# Patient Record
Sex: MALE | Race: BLACK OR AFRICAN AMERICAN | NOT HISPANIC OR LATINO | Employment: PART TIME | URBAN - METROPOLITAN AREA
[De-identification: names, ages, dates, MRNs, and addresses within clinical notes are randomized per-mention and may not be internally consistent; named-entity substitution may affect disease eponyms.]

---

## 2017-09-15 DIAGNOSIS — I10 BENIGN ESSENTIAL HTN: ICD-10-CM

## 2017-09-15 RX ORDER — LOSARTAN POTASSIUM 100 MG/1
TABLET ORAL
Qty: 90 TABLET | Refills: 2 | OUTPATIENT
Start: 2017-09-15

## 2017-09-15 RX ORDER — AMLODIPINE BESYLATE 10 MG/1
TABLET ORAL
Qty: 90 TABLET | Refills: 2 | OUTPATIENT
Start: 2017-09-15

## 2017-09-26 DIAGNOSIS — I10 BENIGN ESSENTIAL HTN: ICD-10-CM

## 2017-09-26 RX ORDER — AMLODIPINE BESYLATE 10 MG/1
TABLET ORAL
Qty: 90 TABLET | Refills: 0 | Status: SHIPPED | OUTPATIENT
Start: 2017-09-26 | End: 2018-01-11 | Stop reason: SDUPTHER

## 2017-09-26 RX ORDER — LOSARTAN POTASSIUM 100 MG/1
TABLET ORAL
Qty: 90 TABLET | Refills: 0 | Status: SHIPPED | OUTPATIENT
Start: 2017-09-26 | End: 2018-01-11 | Stop reason: SDUPTHER

## 2017-12-24 DIAGNOSIS — I10 BENIGN ESSENTIAL HTN: ICD-10-CM

## 2017-12-26 RX ORDER — AMLODIPINE BESYLATE 10 MG/1
TABLET ORAL
Qty: 90 TABLET | Refills: 0 | OUTPATIENT
Start: 2017-12-26

## 2017-12-26 RX ORDER — LOSARTAN POTASSIUM 100 MG/1
TABLET ORAL
Qty: 90 TABLET | Refills: 0 | OUTPATIENT
Start: 2017-12-26

## 2017-12-30 DIAGNOSIS — I10 BENIGN ESSENTIAL HTN: ICD-10-CM

## 2018-01-01 ENCOUNTER — TELEPHONE (OUTPATIENT)
Dept: INTERNAL MEDICINE | Facility: CLINIC | Age: 68
End: 2018-01-01

## 2018-01-01 ENCOUNTER — APPOINTMENT (OUTPATIENT)
Dept: GENERAL RADIOLOGY | Facility: HOSPITAL | Age: 68
End: 2018-01-01
Attending: THORACIC SURGERY (CARDIOTHORACIC VASCULAR SURGERY)

## 2018-01-01 ENCOUNTER — READMISSION MANAGEMENT (OUTPATIENT)
Dept: CALL CENTER | Facility: HOSPITAL | Age: 68
End: 2018-01-01

## 2018-01-01 ENCOUNTER — APPOINTMENT (OUTPATIENT)
Dept: GENERAL RADIOLOGY | Facility: HOSPITAL | Age: 68
End: 2018-01-01

## 2018-01-01 ENCOUNTER — TRANSCRIBE ORDERS (OUTPATIENT)
Dept: CARDIOLOGY | Facility: CLINIC | Age: 68
End: 2018-01-01

## 2018-01-01 ENCOUNTER — APPOINTMENT (OUTPATIENT)
Dept: CARDIOLOGY | Facility: HOSPITAL | Age: 68
End: 2018-01-01

## 2018-01-01 ENCOUNTER — TREATMENT (OUTPATIENT)
Dept: PHYSICAL THERAPY | Facility: CLINIC | Age: 68
End: 2018-01-01

## 2018-01-01 ENCOUNTER — ANESTHESIA (OUTPATIENT)
Dept: PERIOP | Facility: HOSPITAL | Age: 68
End: 2018-01-01

## 2018-01-01 ENCOUNTER — HOSPITAL ENCOUNTER (INPATIENT)
Facility: HOSPITAL | Age: 68
LOS: 9 days | Discharge: HOME HEALTH | End: 2018-12-23
Attending: INTERNAL MEDICINE | Admitting: THORACIC SURGERY (CARDIOTHORACIC VASCULAR SURGERY)

## 2018-01-01 ENCOUNTER — APPOINTMENT (OUTPATIENT)
Dept: RESPIRATORY THERAPY | Facility: HOSPITAL | Age: 68
End: 2018-01-01
Attending: INTERNAL MEDICINE

## 2018-01-01 ENCOUNTER — HOSPITAL ENCOUNTER (OUTPATIENT)
Dept: GENERAL RADIOLOGY | Facility: HOSPITAL | Age: 68
Discharge: HOME | End: 2018-10-25
Admitting: FAMILY MEDICINE

## 2018-01-01 ENCOUNTER — HOSPITAL ENCOUNTER (OUTPATIENT)
Dept: CARDIOLOGY | Facility: HOSPITAL | Age: 68
Discharge: HOME | End: 2018-12-07
Attending: INTERNAL MEDICINE | Admitting: INTERNAL MEDICINE

## 2018-01-01 ENCOUNTER — ANCILLARY PROCEDURE (OUTPATIENT)
Dept: PERIOP | Facility: HOSPITAL | Age: 68
End: 2018-01-01
Attending: ANESTHESIOLOGY

## 2018-01-01 ENCOUNTER — OFFICE VISIT (OUTPATIENT)
Dept: INTERNAL MEDICINE | Facility: CLINIC | Age: 68
End: 2018-01-01

## 2018-01-01 ENCOUNTER — OFFICE VISIT (OUTPATIENT)
Dept: CARDIOLOGY | Facility: CLINIC | Age: 68
End: 2018-01-01

## 2018-01-01 ENCOUNTER — LAB (OUTPATIENT)
Dept: LAB | Facility: HOSPITAL | Age: 68
End: 2018-01-01

## 2018-01-01 ENCOUNTER — ANESTHESIA EVENT (OUTPATIENT)
Dept: PERIOP | Facility: HOSPITAL | Age: 68
End: 2018-01-01

## 2018-01-01 VITALS — WEIGHT: 215 LBS | BODY MASS INDEX: 30.78 KG/M2 | HEIGHT: 70 IN

## 2018-01-01 VITALS
WEIGHT: 218.25 LBS | OXYGEN SATURATION: 99 % | HEART RATE: 89 BPM | SYSTOLIC BLOOD PRESSURE: 95 MMHG | BODY MASS INDEX: 31.25 KG/M2 | HEIGHT: 70 IN | DIASTOLIC BLOOD PRESSURE: 57 MMHG | TEMPERATURE: 99.1 F | RESPIRATION RATE: 18 BRPM

## 2018-01-01 VITALS
SYSTOLIC BLOOD PRESSURE: 128 MMHG | WEIGHT: 217 LBS | HEIGHT: 70 IN | BODY MASS INDEX: 31.07 KG/M2 | DIASTOLIC BLOOD PRESSURE: 80 MMHG | HEART RATE: 92 BPM

## 2018-01-01 VITALS
BODY MASS INDEX: 31.84 KG/M2 | SYSTOLIC BLOOD PRESSURE: 156 MMHG | OXYGEN SATURATION: 93 % | DIASTOLIC BLOOD PRESSURE: 88 MMHG | HEIGHT: 70 IN | HEART RATE: 100 BPM | WEIGHT: 222.4 LBS | TEMPERATURE: 98.3 F

## 2018-01-01 VITALS
DIASTOLIC BLOOD PRESSURE: 80 MMHG | BODY MASS INDEX: 31.22 KG/M2 | SYSTOLIC BLOOD PRESSURE: 138 MMHG | HEIGHT: 70 IN | OXYGEN SATURATION: 98 % | TEMPERATURE: 98.5 F | WEIGHT: 218.1 LBS | HEART RATE: 93 BPM

## 2018-01-01 DIAGNOSIS — I10 BENIGN ESSENTIAL HTN: Primary | ICD-10-CM

## 2018-01-01 DIAGNOSIS — R94.39 ABNORMAL STRESS TEST: ICD-10-CM

## 2018-01-01 DIAGNOSIS — R26.2 DIFFICULTY WALKING: ICD-10-CM

## 2018-01-01 DIAGNOSIS — N13.8 BENIGN PROSTATIC HYPERPLASIA WITH URINARY OBSTRUCTION: ICD-10-CM

## 2018-01-01 DIAGNOSIS — I20.8 STABLE ANGINA (HCC): ICD-10-CM

## 2018-01-01 DIAGNOSIS — Z72.0 TOBACCO ABUSE: ICD-10-CM

## 2018-01-01 DIAGNOSIS — I45.10 BUNDLE BRANCH BLOCK, RIGHT: ICD-10-CM

## 2018-01-01 DIAGNOSIS — R94.31 ABNORMAL EKG: ICD-10-CM

## 2018-01-01 DIAGNOSIS — M25.551 PAIN OF RIGHT HIP JOINT: Primary | ICD-10-CM

## 2018-01-01 DIAGNOSIS — G89.29 CHRONIC PAIN OF RIGHT LOWER EXTREMITY: ICD-10-CM

## 2018-01-01 DIAGNOSIS — Z01.810 PREPROCEDURAL CARDIOVASCULAR EXAMINATION: ICD-10-CM

## 2018-01-01 DIAGNOSIS — M79.604 CHRONIC PAIN OF RIGHT LOWER EXTREMITY: ICD-10-CM

## 2018-01-01 DIAGNOSIS — E78.00 ELEVATED CHOLESTEROL: ICD-10-CM

## 2018-01-01 DIAGNOSIS — R09.02 HYPOXEMIA: ICD-10-CM

## 2018-01-01 DIAGNOSIS — N40.1 BENIGN PROSTATIC HYPERPLASIA WITH URINARY OBSTRUCTION: ICD-10-CM

## 2018-01-01 DIAGNOSIS — E78.00 ELEVATED CHOLESTEROL: Primary | ICD-10-CM

## 2018-01-01 DIAGNOSIS — I20.8 STABLE ANGINA (HCC): Primary | ICD-10-CM

## 2018-01-01 DIAGNOSIS — R29.898 TIGHT UNBALANCED MUSCLES: Primary | ICD-10-CM

## 2018-01-01 DIAGNOSIS — R06.02 SHORTNESS OF BREATH: Primary | ICD-10-CM

## 2018-01-01 DIAGNOSIS — J43.1 PANLOBULAR EMPHYSEMA (HCC): ICD-10-CM

## 2018-01-01 DIAGNOSIS — Z13.6 SCREENING FOR CARDIOVASCULAR CONDITION: Primary | ICD-10-CM

## 2018-01-01 DIAGNOSIS — E55.9 AVITAMINOSIS D: ICD-10-CM

## 2018-01-01 DIAGNOSIS — I25.119 CORONARY ARTERY DISEASE INVOLVING NATIVE CORONARY ARTERY OF NATIVE HEART WITH ANGINA PECTORIS (HCC): Primary | ICD-10-CM

## 2018-01-01 DIAGNOSIS — R06.02 SHORTNESS OF BREATH: ICD-10-CM

## 2018-01-01 DIAGNOSIS — Z12.5 ENCOUNTER FOR SCREENING FOR MALIGNANT NEOPLASM OF PROSTATE: ICD-10-CM

## 2018-01-01 DIAGNOSIS — Z13.6 SCREENING FOR CARDIOVASCULAR CONDITION: ICD-10-CM

## 2018-01-01 DIAGNOSIS — Z23 NEED FOR INFLUENZA VACCINATION: ICD-10-CM

## 2018-01-01 DIAGNOSIS — I42.0 DILATED CARDIOMYOPATHY (HCC): ICD-10-CM

## 2018-01-01 DIAGNOSIS — G47.33 OSA (OBSTRUCTIVE SLEEP APNEA): ICD-10-CM

## 2018-01-01 DIAGNOSIS — M25.551 PAIN OF RIGHT HIP JOINT: ICD-10-CM

## 2018-01-01 DIAGNOSIS — Z95.1 S/P CABG X 7: ICD-10-CM

## 2018-01-01 LAB
ABO + RH BLD: NORMAL
ABO GROUP BLD: NORMAL
ACT BLD: 131 SECONDS (ref 82–152)
ACT BLD: 136 SECONDS (ref 82–152)
ACT BLD: 389 SECONDS (ref 82–152)
ACT BLD: 439 SECONDS (ref 82–152)
ACT BLD: 439 SECONDS (ref 82–152)
ACT BLD: 604 SECONDS (ref 82–152)
ALBUMIN SERPL-MCNC: 3.6 G/DL (ref 3.5–5.2)
ALBUMIN SERPL-MCNC: 4.1 G/DL (ref 3.5–5.2)
ALBUMIN SERPL-MCNC: 4.3 G/DL (ref 3.5–5.2)
ALBUMIN SERPL-MCNC: 4.6 G/DL (ref 3.5–5.2)
ALBUMIN SERPL-MCNC: 4.8 G/DL (ref 3.5–5.2)
ALBUMIN/GLOB SERPL: 1.3 G/DL
ALP SERPL-CCNC: 93 U/L (ref 39–117)
ALT SERPL-CCNC: 29 U/L (ref 1–41)
ALT SERPL-CCNC: 30 U/L (ref 1–41)
ANION GAP SERPL CALCULATED.3IONS-SCNC: 10 MMOL/L
ANION GAP SERPL CALCULATED.3IONS-SCNC: 10.3 MMOL/L
ANION GAP SERPL CALCULATED.3IONS-SCNC: 11 MMOL/L
ANION GAP SERPL CALCULATED.3IONS-SCNC: 11.3 MMOL/L
ANION GAP SERPL CALCULATED.3IONS-SCNC: 11.8 MMOL/L
ANION GAP SERPL CALCULATED.3IONS-SCNC: 11.9 MMOL/L
ANION GAP SERPL CALCULATED.3IONS-SCNC: 13.1 MMOL/L
ANION GAP SERPL CALCULATED.3IONS-SCNC: 13.5 MMOL/L
ANION GAP SERPL CALCULATED.3IONS-SCNC: 14.7 MMOL/L
ANION GAP SERPL CALCULATED.3IONS-SCNC: 5.1 MMOL/L
ANION GAP SERPL CALCULATED.3IONS-SCNC: 7.2 MMOL/L
ANION GAP SERPL CALCULATED.3IONS-SCNC: 8.1 MMOL/L
APTT PPP: 32.3 SECONDS (ref 22.7–35.4)
ARTERIAL PATENCY WRIST A: ABNORMAL
ARTERIAL PATENCY WRIST A: POSITIVE
AST SERPL-CCNC: 22 U/L (ref 1–40)
AST SERPL-CCNC: 25 U/L (ref 1–40)
ATMOSPHERIC PRESS: 754.9 MMHG
ATMOSPHERIC PRESS: 755.4 MMHG
ATMOSPHERIC PRESS: 756.3 MMHG
ATMOSPHERIC PRESS: 756.6 MMHG
ATMOSPHERIC PRESS: 758.3 MMHG
BACTERIA UR QL AUTO: NORMAL /HPF
BASE EXCESS BLDA CALC-SCNC: 1.1 MMOL/L (ref 0–2)
BASE EXCESS BLDA CALC-SCNC: 1.9 MMOL/L (ref 0–2)
BASE EXCESS BLDA CALC-SCNC: 10 MMOL/L (ref -5–5)
BASE EXCESS BLDA CALC-SCNC: 11 MMOL/L (ref -5–5)
BASE EXCESS BLDA CALC-SCNC: 5.2 MMOL/L (ref 0–2)
BASE EXCESS BLDA CALC-SCNC: 6 MMOL/L (ref -5–5)
BASE EXCESS BLDA CALC-SCNC: 7 MMOL/L (ref 0–2)
BASE EXCESS BLDA CALC-SCNC: 7.1 MMOL/L (ref 0–2)
BASE EXCESS BLDA CALC-SCNC: 9 MMOL/L (ref -5–5)
BASOPHILS # BLD AUTO: 0.01 10*3/MM3 (ref 0–0.2)
BASOPHILS # BLD AUTO: 0.01 10*3/MM3 (ref 0–0.2)
BASOPHILS # BLD AUTO: 0.02 10*3/MM3 (ref 0–0.2)
BASOPHILS NFR BLD AUTO: 0.1 % (ref 0–1.5)
BASOPHILS NFR BLD AUTO: 0.1 % (ref 0–1.5)
BASOPHILS NFR BLD AUTO: 0.2 % (ref 0–1.5)
BDY SITE: ABNORMAL
BH BB BLOOD EXPIRATION DATE: NORMAL
BH BB BLOOD TYPE BARCODE: 5100
BH BB BLOOD TYPE BARCODE: 5100
BH BB BLOOD TYPE BARCODE: 6200
BH BB BLOOD TYPE BARCODE: 7300
BH BB DISPENSE STATUS: NORMAL
BH BB PRODUCT CODE: NORMAL
BH BB UNIT NUMBER: NORMAL
BH CV NUCLEAR PRIOR STUDY: 2
BH CV STRESS BP STAGE 1: NORMAL
BH CV STRESS BP STAGE 2: NORMAL
BH CV STRESS DURATION MIN STAGE 1: 3
BH CV STRESS DURATION MIN STAGE 2: 3
BH CV STRESS DURATION SEC STAGE 1: 0
BH CV STRESS DURATION SEC STAGE 2: 0
BH CV STRESS GRADE STAGE 1: 10
BH CV STRESS GRADE STAGE 2: 12
BH CV STRESS HR STAGE 1: 115
BH CV STRESS HR STAGE 2: 135
BH CV STRESS METS STAGE 1: 5
BH CV STRESS METS STAGE 2: 7.5
BH CV STRESS PROTOCOL 1: NORMAL
BH CV STRESS RECOVERY BP: NORMAL MMHG
BH CV STRESS RECOVERY HR: 97 BPM
BH CV STRESS SPEED STAGE 1: 1.7
BH CV STRESS SPEED STAGE 2: 2.5
BH CV STRESS STAGE 1: 1
BH CV STRESS STAGE 2: 2
BH CV XLRA MEAS - DIST GSV CALF DIST LEFT: 0.17 CM
BH CV XLRA MEAS - DIST GSV CALF DIST RIGHT: 0.24 CM
BH CV XLRA MEAS - DIST GSV THIGH DIST LEFT: 0.27 CM
BH CV XLRA MEAS - DIST GSV THIGH DIST RIGHT: 0.28 CM
BH CV XLRA MEAS - GSV ANKLE DIST LEFT: 0.25 CM
BH CV XLRA MEAS - GSV ANKLE DIST RIGHT: 0.24 CM
BH CV XLRA MEAS - GSV KNEE DIST LEFT: 0.26 CM
BH CV XLRA MEAS - GSV KNEE DIST RIGHT: 0.28 CM
BH CV XLRA MEAS - GSV ORIGIN DIST LEFT: 0.36 CM
BH CV XLRA MEAS - GSV ORIGIN DIST RIGHT: 0.66 CM
BH CV XLRA MEAS - MID GSV CALF LEFT: 0.17 CM
BH CV XLRA MEAS - MID GSV CALF RIGHT: 0.24 CM
BH CV XLRA MEAS - MID GSV THIGH  LEFT: 0.2 CM
BH CV XLRA MEAS - MID GSV THIGH  RIGHT: 0.38 CM
BH CV XLRA MEAS - PROX GSV CALF DIST LEFT: 0.26 CM
BH CV XLRA MEAS - PROX GSV CALF DIST RIGHT: 0.24 CM
BH CV XLRA MEAS - PROX GSV THIGH  LEFT: 0.27 CM
BH CV XLRA MEAS - PROX GSV THIGH  RIGHT: 0.33 CM
BH CV XLRA MEAS LEFT CCA RATIO VEL: -52.8 CM/SEC
BH CV XLRA MEAS LEFT DIST CCA EDV: -12.3 CM/SEC
BH CV XLRA MEAS LEFT DIST CCA PSV: -52.8 CM/SEC
BH CV XLRA MEAS LEFT DIST ICA EDV: 25 CM/SEC
BH CV XLRA MEAS LEFT DIST ICA PSV: 59 CM/SEC
BH CV XLRA MEAS LEFT ICA RATIO VEL: -48.1 CM/SEC
BH CV XLRA MEAS LEFT ICA/CCA RATIO: 0.91
BH CV XLRA MEAS LEFT MID ICA EDV: 25 CM/SEC
BH CV XLRA MEAS LEFT MID ICA PSV: 60 CM/SEC
BH CV XLRA MEAS LEFT PROX CCA EDV: 19.3 CM/SEC
BH CV XLRA MEAS LEFT PROX CCA PSV: 97.3 CM/SEC
BH CV XLRA MEAS LEFT PROX ECA EDV: -14.1 CM/SEC
BH CV XLRA MEAS LEFT PROX ECA PSV: -91.5 CM/SEC
BH CV XLRA MEAS LEFT PROX ICA EDV: -19.3 CM/SEC
BH CV XLRA MEAS LEFT PROX ICA PSV: -48.1 CM/SEC
BH CV XLRA MEAS LEFT PROX SCLA PSV: 141 CM/SEC
BH CV XLRA MEAS LEFT VERTEBRAL A EDV: -18.8 CM/SEC
BH CV XLRA MEAS LEFT VERTEBRAL A PSV: -44.6 CM/SEC
BH CV XLRA MEAS RIGHT CCA RATIO VEL: -59.8 CM/SEC
BH CV XLRA MEAS RIGHT DIST CCA EDV: -12.9 CM/SEC
BH CV XLRA MEAS RIGHT DIST CCA PSV: -59.8 CM/SEC
BH CV XLRA MEAS RIGHT DIST ICA EDV: 30 CM/SEC
BH CV XLRA MEAS RIGHT DIST ICA PSV: 67 CM/SEC
BH CV XLRA MEAS RIGHT ICA RATIO VEL: -42.8 CM/SEC
BH CV XLRA MEAS RIGHT ICA/CCA RATIO: 0.72
BH CV XLRA MEAS RIGHT MID ICA EDV: 17 CM/SEC
BH CV XLRA MEAS RIGHT MID ICA PSV: 71 CM/SEC
BH CV XLRA MEAS RIGHT PROX CCA EDV: 13.5 CM/SEC
BH CV XLRA MEAS RIGHT PROX CCA PSV: 77.4 CM/SEC
BH CV XLRA MEAS RIGHT PROX ECA EDV: -11 CM/SEC
BH CV XLRA MEAS RIGHT PROX ECA PSV: -117 CM/SEC
BH CV XLRA MEAS RIGHT PROX ICA EDV: -13.5 CM/SEC
BH CV XLRA MEAS RIGHT PROX ICA PSV: -42.8 CM/SEC
BH CV XLRA MEAS RIGHT PROX SCLA PSV: 104 CM/SEC
BH CV XLRA MEAS RIGHT VERTEBRAL A EDV: -15.8 CM/SEC
BH CV XLRA MEAS RIGHT VERTEBRAL A PSV: -72.7 CM/SEC
BILIRUB SERPL-MCNC: 0.4 MG/DL (ref 0.1–1.2)
BILIRUB UR QL STRIP: NEGATIVE
BLD GP AB SCN SERPL QL: NEGATIVE
BUN BLD-MCNC: 11 MG/DL (ref 8–23)
BUN BLD-MCNC: 12 MG/DL (ref 8–23)
BUN BLD-MCNC: 13 MG/DL (ref 8–23)
BUN BLD-MCNC: 14 MG/DL (ref 8–23)
BUN BLD-MCNC: 24 MG/DL (ref 8–23)
BUN BLD-MCNC: 25 MG/DL (ref 8–23)
BUN BLD-MCNC: 25 MG/DL (ref 8–23)
BUN BLD-MCNC: 27 MG/DL (ref 8–23)
BUN BLD-MCNC: 31 MG/DL (ref 8–23)
BUN SERPL-MCNC: 12 MG/DL (ref 8–23)
BUN/CREAT SERPL: 12.8 (ref 7–25)
BUN/CREAT SERPL: 12.9 (ref 7–25)
BUN/CREAT SERPL: 13.1 (ref 7–25)
BUN/CREAT SERPL: 13.9 (ref 7–25)
BUN/CREAT SERPL: 14.9 (ref 7–25)
BUN/CREAT SERPL: 15.2 (ref 7–25)
BUN/CREAT SERPL: 15.8 (ref 7–25)
BUN/CREAT SERPL: 15.9 (ref 7–25)
BUN/CREAT SERPL: 21.2 (ref 7–25)
BUN/CREAT SERPL: 22.3 (ref 7–25)
BUN/CREAT SERPL: 24.8 (ref 7–25)
BUN/CREAT SERPL: 26 (ref 7–25)
BUN/CREAT SERPL: 30.7 (ref 7–25)
CA-I BLD-MCNC: 5.3 MG/DL (ref 4.6–5.4)
CA-I SERPL ISE-MCNC: 1.33 MMOL/L (ref 1.15–1.35)
CALCIUM SERPL-MCNC: 10.5 MG/DL (ref 8.6–10.5)
CALCIUM SPEC-SCNC: 10.6 MG/DL (ref 8.6–10.5)
CALCIUM SPEC-SCNC: 8.9 MG/DL (ref 8.6–10.5)
CALCIUM SPEC-SCNC: 9 MG/DL (ref 8.6–10.5)
CALCIUM SPEC-SCNC: 9.3 MG/DL (ref 8.6–10.5)
CALCIUM SPEC-SCNC: 9.4 MG/DL (ref 8.6–10.5)
CALCIUM SPEC-SCNC: 9.6 MG/DL (ref 8.6–10.5)
CALCIUM SPEC-SCNC: 9.7 MG/DL (ref 8.6–10.5)
CALCIUM SPEC-SCNC: 9.7 MG/DL (ref 8.6–10.5)
CALCIUM SPEC-SCNC: 9.8 MG/DL (ref 8.6–10.5)
CALCIUM SPEC-SCNC: 9.9 MG/DL (ref 8.6–10.5)
CHLORIDE SERPL-SCNC: 100 MMOL/L (ref 98–107)
CHLORIDE SERPL-SCNC: 102 MMOL/L (ref 98–107)
CHLORIDE SERPL-SCNC: 103 MMOL/L (ref 98–107)
CHLORIDE SERPL-SCNC: 104 MMOL/L (ref 98–107)
CHLORIDE SERPL-SCNC: 104 MMOL/L (ref 98–107)
CHLORIDE SERPL-SCNC: 97 MMOL/L (ref 98–107)
CHLORIDE SERPL-SCNC: 98 MMOL/L (ref 98–107)
CHLORIDE SERPL-SCNC: 99 MMOL/L (ref 98–107)
CHLORIDE SERPL-SCNC: 99 MMOL/L (ref 98–107)
CHOLEST SERPL-MCNC: 126 MG/DL (ref 0–200)
CHOLEST SERPL-MCNC: 126 MG/DL (ref 0–200)
CLARITY UR: CLEAR
CLOSE TME COLL+ADP + EPINEP PNL BLD: 40 % (ref 86–100)
CO2 BLDA-SCNC: 32 MMOL/L (ref 24–29)
CO2 BLDA-SCNC: 33 MMOL/L (ref 24–29)
CO2 BLDA-SCNC: 34 MMOL/L (ref 24–29)
CO2 BLDA-SCNC: 36 MMOL/L (ref 24–29)
CO2 BLDA-SCNC: 37 MMOL/L (ref 24–29)
CO2 BLDA-SCNC: 38 MMOL/L (ref 24–29)
CO2 SERPL-SCNC: 23.3 MMOL/L (ref 22–29)
CO2 SERPL-SCNC: 25 MMOL/L (ref 22–29)
CO2 SERPL-SCNC: 25.5 MMOL/L (ref 22–29)
CO2 SERPL-SCNC: 26.8 MMOL/L (ref 22–29)
CO2 SERPL-SCNC: 27.7 MMOL/L (ref 22–29)
CO2 SERPL-SCNC: 27.9 MMOL/L (ref 22–29)
CO2 SERPL-SCNC: 28.1 MMOL/L (ref 22–29)
CO2 SERPL-SCNC: 28.2 MMOL/L (ref 22–29)
CO2 SERPL-SCNC: 28.7 MMOL/L (ref 22–29)
CO2 SERPL-SCNC: 28.9 MMOL/L (ref 22–29)
CO2 SERPL-SCNC: 31 MMOL/L (ref 22–29)
CO2 SERPL-SCNC: 33.8 MMOL/L (ref 22–29)
CO2 SERPL-SCNC: 33.9 MMOL/L (ref 22–29)
COLOR UR: YELLOW
CREAT BLD-MCNC: 0.76 MG/DL (ref 0.76–1.27)
CREAT BLD-MCNC: 0.85 MG/DL (ref 0.76–1.27)
CREAT BLD-MCNC: 0.88 MG/DL (ref 0.76–1.27)
CREAT BLD-MCNC: 0.92 MG/DL (ref 0.76–1.27)
CREAT BLD-MCNC: 0.94 MG/DL (ref 0.76–1.27)
CREAT BLD-MCNC: 0.99 MG/DL (ref 0.76–1.27)
CREAT BLD-MCNC: 1.01 MG/DL (ref 0.76–1.27)
CREAT BLD-MCNC: 1.04 MG/DL (ref 0.76–1.27)
CREAT BLD-MCNC: 1.12 MG/DL (ref 0.76–1.27)
CREAT BLD-MCNC: 1.13 MG/DL (ref 0.76–1.27)
CREAT SERPL-MCNC: 0.94 MG/DL (ref 0.76–1.27)
CROSSMATCH INTERPRETATION: NORMAL
CROSSMATCH INTERPRETATION: NORMAL
DEPRECATED RDW RBC AUTO: 53.5 FL (ref 37–54)
DEPRECATED RDW RBC AUTO: 56.1 FL (ref 37–54)
DEPRECATED RDW RBC AUTO: 56.3 FL (ref 37–54)
DEPRECATED RDW RBC AUTO: 56.4 FL (ref 37–54)
DEPRECATED RDW RBC AUTO: 56.4 FL (ref 37–54)
DEPRECATED RDW RBC AUTO: 57.7 FL (ref 37–54)
DEPRECATED RDW RBC AUTO: 58.8 FL (ref 37–54)
DEPRECATED RDW RBC AUTO: 59.5 FL (ref 37–54)
EOSINOPHIL # BLD AUTO: 0.06 10*3/MM3 (ref 0–0.7)
EOSINOPHIL # BLD AUTO: 0.26 10*3/MM3 (ref 0–0.7)
EOSINOPHIL # BLD AUTO: 0.26 10*3/MM3 (ref 0–0.7)
EOSINOPHIL NFR BLD AUTO: 0.7 % (ref 0.3–6.2)
EOSINOPHIL NFR BLD AUTO: 2.1 % (ref 0.3–6.2)
EOSINOPHIL NFR BLD AUTO: 2.5 % (ref 0.3–6.2)
ERYTHROCYTE [DISTWIDTH] IN BLOOD BY AUTOMATED COUNT: 15.9 % (ref 11.5–14.5)
ERYTHROCYTE [DISTWIDTH] IN BLOOD BY AUTOMATED COUNT: 16 % (ref 11.5–14.5)
ERYTHROCYTE [DISTWIDTH] IN BLOOD BY AUTOMATED COUNT: 16 % (ref 11.5–14.5)
ERYTHROCYTE [DISTWIDTH] IN BLOOD BY AUTOMATED COUNT: 16.1 % (ref 11.5–14.5)
ERYTHROCYTE [DISTWIDTH] IN BLOOD BY AUTOMATED COUNT: 16.6 % (ref 11.5–14.5)
ERYTHROCYTE [DISTWIDTH] IN BLOOD BY AUTOMATED COUNT: 16.7 % (ref 11.5–14.5)
FIBRINOGEN PPP-MCNC: 332 MG/DL (ref 219–464)
GAS FLOW AIRWAY: 2 LPM
GFR SERPL CREATININE-BSD FRML MDRD: 101 ML/MIN/1.73
GFR SERPL CREATININE-BSD FRML MDRD: 64 ML/MIN/1.73
GFR SERPL CREATININE-BSD FRML MDRD: 65 ML/MIN/1.73
GFR SERPL CREATININE-BSD FRML MDRD: 71 ML/MIN/1.73
GFR SERPL CREATININE-BSD FRML MDRD: 73 ML/MIN/1.73
GFR SERPL CREATININE-BSD FRML MDRD: 75 ML/MIN/1.73
GFR SERPL CREATININE-BSD FRML MDRD: 79 ML/MIN/1.73
GFR SERPL CREATININE-BSD FRML MDRD: 81 ML/MIN/1.73
GFR SERPL CREATININE-BSD FRML MDRD: 86 ML/MIN/1.73
GFR SERPL CREATININE-BSD FRML MDRD: 89 ML/MIN/1.73
GLOBULIN SER CALC-MCNC: 3.8 GM/DL
GLUCOSE BLD-MCNC: 104 MG/DL (ref 65–99)
GLUCOSE BLD-MCNC: 104 MG/DL (ref 65–99)
GLUCOSE BLD-MCNC: 106 MG/DL (ref 65–99)
GLUCOSE BLD-MCNC: 107 MG/DL (ref 65–99)
GLUCOSE BLD-MCNC: 108 MG/DL (ref 65–99)
GLUCOSE BLD-MCNC: 111 MG/DL (ref 65–99)
GLUCOSE BLD-MCNC: 113 MG/DL (ref 65–99)
GLUCOSE BLD-MCNC: 120 MG/DL (ref 65–99)
GLUCOSE BLD-MCNC: 124 MG/DL (ref 65–99)
GLUCOSE BLD-MCNC: 127 MG/DL (ref 65–99)
GLUCOSE BLD-MCNC: 131 MG/DL (ref 65–99)
GLUCOSE BLD-MCNC: 140 MG/DL (ref 65–99)
GLUCOSE BLDC GLUCOMTR-MCNC: 100 MG/DL (ref 70–130)
GLUCOSE BLDC GLUCOMTR-MCNC: 104 MG/DL (ref 70–130)
GLUCOSE BLDC GLUCOMTR-MCNC: 106 MG/DL (ref 70–130)
GLUCOSE BLDC GLUCOMTR-MCNC: 112 MG/DL (ref 70–130)
GLUCOSE BLDC GLUCOMTR-MCNC: 113 MG/DL (ref 70–130)
GLUCOSE BLDC GLUCOMTR-MCNC: 115 MG/DL (ref 70–130)
GLUCOSE BLDC GLUCOMTR-MCNC: 118 MG/DL (ref 70–130)
GLUCOSE BLDC GLUCOMTR-MCNC: 119 MG/DL (ref 70–130)
GLUCOSE BLDC GLUCOMTR-MCNC: 121 MG/DL (ref 70–130)
GLUCOSE BLDC GLUCOMTR-MCNC: 121 MG/DL (ref 70–130)
GLUCOSE BLDC GLUCOMTR-MCNC: 123 MG/DL (ref 70–130)
GLUCOSE BLDC GLUCOMTR-MCNC: 123 MG/DL (ref 70–130)
GLUCOSE BLDC GLUCOMTR-MCNC: 127 MG/DL (ref 70–130)
GLUCOSE BLDC GLUCOMTR-MCNC: 128 MG/DL (ref 70–130)
GLUCOSE BLDC GLUCOMTR-MCNC: 129 MG/DL (ref 70–130)
GLUCOSE BLDC GLUCOMTR-MCNC: 130 MG/DL (ref 70–130)
GLUCOSE BLDC GLUCOMTR-MCNC: 131 MG/DL (ref 70–130)
GLUCOSE BLDC GLUCOMTR-MCNC: 133 MG/DL (ref 70–130)
GLUCOSE BLDC GLUCOMTR-MCNC: 134 MG/DL (ref 70–130)
GLUCOSE BLDC GLUCOMTR-MCNC: 136 MG/DL (ref 70–130)
GLUCOSE BLDC GLUCOMTR-MCNC: 140 MG/DL (ref 70–130)
GLUCOSE BLDC GLUCOMTR-MCNC: 144 MG/DL (ref 70–130)
GLUCOSE BLDC GLUCOMTR-MCNC: 145 MG/DL (ref 70–130)
GLUCOSE BLDC GLUCOMTR-MCNC: 153 MG/DL (ref 70–130)
GLUCOSE BLDC GLUCOMTR-MCNC: 161 MG/DL (ref 70–130)
GLUCOSE BLDC GLUCOMTR-MCNC: 194 MG/DL (ref 70–130)
GLUCOSE BLDC GLUCOMTR-MCNC: 95 MG/DL (ref 70–130)
GLUCOSE SERPL-MCNC: 82 MG/DL (ref 65–99)
GLUCOSE UR STRIP-MCNC: NEGATIVE MG/DL
HBA1C MFR BLD: 5.74 % (ref 4.8–5.6)
HCO3 BLDA-SCNC: 28.2 MMOL/L (ref 22–28)
HCO3 BLDA-SCNC: 28.3 MMOL/L (ref 22–28)
HCO3 BLDA-SCNC: 29.9 MMOL/L (ref 22–28)
HCO3 BLDA-SCNC: 30.6 MMOL/L (ref 22–26)
HCO3 BLDA-SCNC: 31.2 MMOL/L (ref 22–28)
HCO3 BLDA-SCNC: 31.3 MMOL/L (ref 22–26)
HCO3 BLDA-SCNC: 31.9 MMOL/L (ref 22–26)
HCO3 BLDA-SCNC: 34.3 MMOL/L (ref 22–26)
HCO3 BLDA-SCNC: 35.1 MMOL/L (ref 22–26)
HCO3 BLDA-SCNC: 35.2 MMOL/L (ref 22–28)
HCO3 BLDA-SCNC: 36.1 MMOL/L (ref 22–26)
HCT VFR BLD AUTO: 26 % (ref 40.4–52.2)
HCT VFR BLD AUTO: 26.7 % (ref 40.4–52.2)
HCT VFR BLD AUTO: 27.1 % (ref 40.4–52.2)
HCT VFR BLD AUTO: 29.5 % (ref 40.4–52.2)
HCT VFR BLD AUTO: 29.7 % (ref 40.4–52.2)
HCT VFR BLD AUTO: 31.2 % (ref 40.4–52.2)
HCT VFR BLD AUTO: 34 % (ref 40.4–52.2)
HCT VFR BLD AUTO: 34.2 % (ref 40.4–52.2)
HCT VFR BLD AUTO: 43.5 % (ref 40.4–52.2)
HCT VFR BLD AUTO: 47.9 % (ref 40.4–52.2)
HCT VFR BLDA CALC: 32 % (ref 38–51)
HCT VFR BLDA CALC: 33 % (ref 38–51)
HCT VFR BLDA CALC: 34 % (ref 38–51)
HCT VFR BLDA CALC: 39 % (ref 38–51)
HCT VFR BLDA CALC: 39 % (ref 38–51)
HCT VFR BLDA CALC: 42 % (ref 38–51)
HDLC SERPL-MCNC: 40 MG/DL (ref 40–60)
HDLC SERPL-MCNC: 48 MG/DL (ref 40–60)
HGB BLD-MCNC: 10.4 G/DL (ref 13.7–17.6)
HGB BLD-MCNC: 11.1 G/DL (ref 13.7–17.6)
HGB BLD-MCNC: 13.2 G/DL (ref 13.7–17.6)
HGB BLD-MCNC: 15.1 G/DL (ref 13.7–17.6)
HGB BLD-MCNC: 8.1 G/DL (ref 13.7–17.6)
HGB BLD-MCNC: 8.3 G/DL (ref 13.7–17.6)
HGB BLD-MCNC: 8.3 G/DL (ref 13.7–17.6)
HGB BLD-MCNC: 9.2 G/DL (ref 13.7–17.6)
HGB BLD-MCNC: 9.3 G/DL (ref 13.7–17.6)
HGB BLD-MCNC: 9.7 G/DL (ref 13.7–17.6)
HGB BLDA-MCNC: 10.9 G/DL (ref 12–17)
HGB BLDA-MCNC: 11.2 G/DL (ref 12–17)
HGB BLDA-MCNC: 11.6 G/DL (ref 12–17)
HGB BLDA-MCNC: 13.3 G/DL (ref 12–17)
HGB BLDA-MCNC: 13.3 G/DL (ref 12–17)
HGB BLDA-MCNC: 14.3 G/DL (ref 12–17)
HGB UR QL STRIP.AUTO: ABNORMAL
HOROWITZ INDEX BLD+IHG-RTO: 100 %
HOROWITZ INDEX BLD+IHG-RTO: 40 %
HYALINE CASTS UR QL AUTO: NORMAL /LPF
IMM GRANULOCYTES # BLD: 0.03 10*3/MM3 (ref 0–0.03)
IMM GRANULOCYTES # BLD: 0.03 10*3/MM3 (ref 0–0.03)
IMM GRANULOCYTES # BLD: 0.07 10*3/MM3 (ref 0–0.03)
IMM GRANULOCYTES NFR BLD: 0.3 % (ref 0–0.5)
IMM GRANULOCYTES NFR BLD: 0.3 % (ref 0–0.5)
IMM GRANULOCYTES NFR BLD: 0.6 % (ref 0–0.5)
INR PPP: 1.28 (ref 0.9–1.1)
INR PPP: 1.46 (ref 0.9–1.1)
KETONES UR QL STRIP: NEGATIVE
LDLC SERPL CALC-MCNC: 69 MG/DL (ref 0–100)
LDLC SERPL CALC-MCNC: 74 MG/DL (ref 0–100)
LDLC/HDLC SERPL: 1.85 {RATIO}
LEFT ARM BP: NORMAL MMHG
LEUKOCYTE ESTERASE UR QL STRIP.AUTO: NEGATIVE
LV EF NUC BP: 24 %
LYMPHOCYTES # BLD AUTO: 1.2 10*3/MM3 (ref 0.9–4.8)
LYMPHOCYTES # BLD AUTO: 2.84 10*3/MM3 (ref 0.9–4.8)
LYMPHOCYTES # BLD AUTO: 2.87 10*3/MM3 (ref 0.9–4.8)
LYMPHOCYTES NFR BLD AUTO: 13.2 % (ref 19.6–45.3)
LYMPHOCYTES NFR BLD AUTO: 22.8 % (ref 19.6–45.3)
LYMPHOCYTES NFR BLD AUTO: 27.9 % (ref 19.6–45.3)
MAGNESIUM SERPL-MCNC: 2 MG/DL (ref 1.6–2.4)
MAGNESIUM SERPL-MCNC: 2.3 MG/DL (ref 1.6–2.4)
MAGNESIUM SERPL-MCNC: 2.4 MG/DL (ref 1.6–2.4)
MAGNESIUM SERPL-MCNC: 2.8 MG/DL (ref 1.6–2.4)
MAXIMAL PREDICTED HEART RATE: 150 BPM
MCH RBC QN AUTO: 29.5 PG (ref 27–32.7)
MCH RBC QN AUTO: 29.8 PG (ref 27–32.7)
MCH RBC QN AUTO: 29.8 PG (ref 27–32.7)
MCH RBC QN AUTO: 29.9 PG (ref 27–32.7)
MCH RBC QN AUTO: 29.9 PG (ref 27–32.7)
MCH RBC QN AUTO: 30.1 PG (ref 27–32.7)
MCH RBC QN AUTO: 30.2 PG (ref 27–32.7)
MCH RBC QN AUTO: 30.2 PG (ref 27–32.7)
MCH RBC QN AUTO: 30.5 PG (ref 27–32.7)
MCH RBC QN AUTO: 30.7 PG (ref 27–32.7)
MCHC RBC AUTO-ENTMCNC: 30.3 G/DL (ref 32.6–36.4)
MCHC RBC AUTO-ENTMCNC: 30.4 G/DL (ref 32.6–36.4)
MCHC RBC AUTO-ENTMCNC: 30.6 G/DL (ref 32.6–36.4)
MCHC RBC AUTO-ENTMCNC: 31.1 G/DL (ref 32.6–36.4)
MCHC RBC AUTO-ENTMCNC: 31.1 G/DL (ref 32.6–36.4)
MCHC RBC AUTO-ENTMCNC: 31.2 G/DL (ref 32.6–36.4)
MCHC RBC AUTO-ENTMCNC: 31.2 G/DL (ref 32.6–36.4)
MCHC RBC AUTO-ENTMCNC: 31.3 G/DL (ref 32.6–36.4)
MCHC RBC AUTO-ENTMCNC: 31.5 G/DL (ref 32.6–36.4)
MCHC RBC AUTO-ENTMCNC: 32.6 G/DL (ref 32.6–36.4)
MCV RBC AUTO: 91.6 FL (ref 79.8–96.2)
MCV RBC AUTO: 95.8 FL (ref 79.8–96.2)
MCV RBC AUTO: 95.8 FL (ref 79.8–96.2)
MCV RBC AUTO: 96 FL (ref 79.8–96.2)
MCV RBC AUTO: 96.1 FL (ref 79.8–96.2)
MCV RBC AUTO: 97.2 FL (ref 79.8–96.2)
MCV RBC AUTO: 98.2 FL (ref 79.8–96.2)
MCV RBC AUTO: 98.2 FL (ref 79.8–96.2)
MCV RBC AUTO: 98.5 FL (ref 79.8–96.2)
MCV RBC AUTO: 98.5 FL (ref 79.8–96.2)
MODALITY: ABNORMAL
MONOCYTES # BLD AUTO: 0.41 10*3/MM3 (ref 0.2–1.2)
MONOCYTES # BLD AUTO: 0.65 10*3/MM3 (ref 0.2–1.2)
MONOCYTES # BLD AUTO: 0.7 10*3/MM3 (ref 0.2–1.2)
MONOCYTES NFR BLD AUTO: 3.3 % (ref 5–12)
MONOCYTES NFR BLD AUTO: 6.3 % (ref 5–12)
MONOCYTES NFR BLD AUTO: 7.7 % (ref 5–12)
NEUTROPHILS # BLD AUTO: 6.47 10*3/MM3 (ref 1.9–8.1)
NEUTROPHILS # BLD AUTO: 7.09 10*3/MM3 (ref 1.9–8.1)
NEUTROPHILS # BLD AUTO: 8.93 10*3/MM3 (ref 1.9–8.1)
NEUTROPHILS NFR BLD AUTO: 62.9 % (ref 42.7–76)
NEUTROPHILS NFR BLD AUTO: 71.6 % (ref 42.7–76)
NEUTROPHILS NFR BLD AUTO: 78 % (ref 42.7–76)
NITRITE UR QL STRIP: NEGATIVE
NT-PROBNP SERPL-MCNC: 360.5 PG/ML (ref 0–900)
O2 A-A PPRESDIFF RESPIRATORY: 0.4 MMHG
O2 A-A PPRESDIFF RESPIRATORY: 0.5 MMHG
O2 A-A PPRESDIFF RESPIRATORY: 0.5 MMHG
O2 A-A PPRESDIFF RESPIRATORY: 0.6 MMHG
PCO2 BLDA: 41.3 MM HG (ref 35–45)
PCO2 BLDA: 43.3 MM HG (ref 35–45)
PCO2 BLDA: 49.7 MM HG (ref 35–45)
PCO2 BLDA: 52.3 MM HG (ref 35–45)
PCO2 BLDA: 54.5 MM HG (ref 35–45)
PCO2 BLDA: 56.7 MM HG (ref 35–45)
PCO2 BLDA: 57 MM HG (ref 35–45)
PCO2 BLDA: 57.5 MM HG (ref 35–45)
PCO2 BLDA: 58 MM HG (ref 35–45)
PCO2 BLDA: 61.6 MM HG (ref 35–45)
PCO2 BLDA: 63.9 MM HG (ref 35–45)
PEEP RESPIRATORY: 7.5 CM[H2O]
PERCENT MAX PREDICTED HR: 90 %
PH BLDA: 7.3 PH UNITS (ref 7.35–7.45)
PH BLDA: 7.34 PH UNITS (ref 7.35–7.45)
PH BLDA: 7.35 PH UNITS (ref 7.35–7.45)
PH BLDA: 7.35 PH UNITS (ref 7.35–7.6)
PH BLDA: 7.35 PH UNITS (ref 7.35–7.6)
PH BLDA: 7.37 PH UNITS (ref 7.35–7.6)
PH BLDA: 7.4 PH UNITS (ref 7.35–7.6)
PH BLDA: 7.4 PH UNITS (ref 7.35–7.6)
PH BLDA: 7.41 PH UNITS (ref 7.35–7.6)
PH BLDA: 7.45 PH UNITS (ref 7.35–7.45)
PH BLDA: 7.49 PH UNITS (ref 7.35–7.45)
PH UR STRIP.AUTO: 6.5 [PH] (ref 5–8)
PHOSPHATE SERPL-MCNC: 1.9 MG/DL (ref 2.5–4.5)
PHOSPHATE SERPL-MCNC: 2.5 MG/DL (ref 2.5–4.5)
PHOSPHATE SERPL-MCNC: 2.6 MG/DL (ref 2.5–4.5)
PHOSPHATE SERPL-MCNC: 3.3 MG/DL (ref 2.5–4.5)
PLATELET # BLD AUTO: 142 10*3/MM3 (ref 140–500)
PLATELET # BLD AUTO: 152 10*3/MM3 (ref 140–500)
PLATELET # BLD AUTO: 162 10*3/MM3 (ref 140–500)
PLATELET # BLD AUTO: 170 10*3/MM3 (ref 140–500)
PLATELET # BLD AUTO: 182 10*3/MM3 (ref 140–500)
PLATELET # BLD AUTO: 188 10*3/MM3 (ref 140–500)
PLATELET # BLD AUTO: 188 10*3/MM3 (ref 140–500)
PLATELET # BLD AUTO: 191 10*3/MM3 (ref 140–500)
PLATELET # BLD AUTO: 198 10*3/MM3 (ref 140–500)
PLATELET # BLD AUTO: 198 10*3/MM3 (ref 140–500)
PMV BLD AUTO: 9 FL (ref 6–12)
PMV BLD AUTO: 9.3 FL (ref 6–12)
PMV BLD AUTO: 9.3 FL (ref 6–12)
PMV BLD AUTO: 9.5 FL (ref 6–12)
PMV BLD AUTO: 9.5 FL (ref 6–12)
PMV BLD AUTO: 9.6 FL (ref 6–12)
PMV BLD AUTO: 9.7 FL (ref 6–12)
PMV BLD AUTO: 9.8 FL (ref 6–12)
PMV BLD AUTO: 9.8 FL (ref 6–12)
PMV BLD AUTO: 9.9 FL (ref 6–12)
PO2 BLDA: 104.3 MM HG (ref 80–100)
PO2 BLDA: 104.9 MM HG (ref 80–100)
PO2 BLDA: 111.9 MM HG (ref 80–100)
PO2 BLDA: 164 MMHG (ref 80–105)
PO2 BLDA: 338 MMHG (ref 80–105)
PO2 BLDA: 396 MM HG (ref 80–100)
PO2 BLDA: 44 MMHG (ref 80–105)
PO2 BLDA: 451 MMHG (ref 80–105)
PO2 BLDA: 483 MMHG (ref 80–105)
PO2 BLDA: 511 MMHG (ref 80–105)
PO2 BLDA: 72.8 MM HG (ref 80–100)
POTASSIUM BLD-SCNC: 3.6 MMOL/L (ref 3.5–5.2)
POTASSIUM BLD-SCNC: 3.8 MMOL/L (ref 3.5–5.2)
POTASSIUM BLD-SCNC: 3.9 MMOL/L (ref 3.5–5.2)
POTASSIUM BLD-SCNC: 4 MMOL/L (ref 3.5–5.2)
POTASSIUM BLD-SCNC: 4 MMOL/L (ref 3.5–5.2)
POTASSIUM BLD-SCNC: 4.2 MMOL/L (ref 3.5–5.2)
POTASSIUM BLD-SCNC: 4.2 MMOL/L (ref 3.5–5.2)
POTASSIUM BLD-SCNC: 4.3 MMOL/L (ref 3.5–5.2)
POTASSIUM BLD-SCNC: 4.3 MMOL/L (ref 3.5–5.2)
POTASSIUM BLD-SCNC: 4.4 MMOL/L (ref 3.5–5.2)
POTASSIUM BLD-SCNC: 4.4 MMOL/L (ref 3.5–5.2)
POTASSIUM BLD-SCNC: 4.7 MMOL/L (ref 3.5–5.2)
POTASSIUM BLD-SCNC: 4.8 MMOL/L (ref 3.5–5.2)
POTASSIUM BLDA-SCNC: 4.1 MMOL/L (ref 3.5–4.9)
POTASSIUM BLDA-SCNC: 4.3 MMOL/L (ref 3.5–4.9)
POTASSIUM BLDA-SCNC: 4.5 MMOL/L (ref 3.5–4.9)
POTASSIUM BLDA-SCNC: 4.7 MMOL/L (ref 3.5–4.9)
POTASSIUM SERPL-SCNC: 4.3 MMOL/L (ref 3.5–5.2)
PROT SERPL-MCNC: 8.6 G/DL (ref 6–8.5)
PROT UR QL STRIP: NEGATIVE
PROTHROMBIN TIME: 15.8 SECONDS (ref 11.7–14.2)
PROTHROMBIN TIME: 17.5 SECONDS (ref 11.7–14.2)
PSA SERPL-MCNC: 3.56 NG/ML (ref 0–4)
PSV: 8 CMH2O
RBC # BLD AUTO: 2.64 10*6/MM3 (ref 4.6–6)
RBC # BLD AUTO: 2.72 10*6/MM3 (ref 4.6–6)
RBC # BLD AUTO: 2.75 10*6/MM3 (ref 4.6–6)
RBC # BLD AUTO: 3.08 10*6/MM3 (ref 4.6–6)
RBC # BLD AUTO: 3.09 10*6/MM3 (ref 4.6–6)
RBC # BLD AUTO: 3.25 10*6/MM3 (ref 4.6–6)
RBC # BLD AUTO: 3.52 10*6/MM3 (ref 4.6–6)
RBC # BLD AUTO: 3.71 10*6/MM3 (ref 4.6–6)
RBC # BLD AUTO: 4.43 10*6/MM3 (ref 4.6–6)
RBC # BLD AUTO: 5 10*6/MM3 (ref 4.6–6)
RBC # UR: NORMAL /HPF
REF LAB TEST METHOD: NORMAL
RH BLD: POSITIVE
RIGHT ARM BP: NORMAL MMHG
SAO2 % BLDA: 100 % (ref 95–98)
SAO2 % BLDA: 51 % (ref 95–98)
SAO2 % BLDA: 76 % (ref 95–98)
SAO2 % BLDA: 85 % (ref 95–98)
SAO2 % BLDA: 99 % (ref 95–98)
SAO2 % BLDCOA: 100 % (ref 92–99)
SAO2 % BLDCOA: 92.9 % (ref 92–99)
SAO2 % BLDCOA: 97.1 % (ref 92–99)
SAO2 % BLDCOA: 97.6 % (ref 92–99)
SAO2 % BLDCOA: 98.5 % (ref 92–99)
SET MECH RESP RATE: 12
SET MECH RESP RATE: 16
SODIUM BLD-SCNC: 136 MMOL/L (ref 136–145)
SODIUM BLD-SCNC: 136 MMOL/L (ref 136–145)
SODIUM BLD-SCNC: 137 MMOL/L (ref 136–145)
SODIUM BLD-SCNC: 137 MMOL/L (ref 136–145)
SODIUM BLD-SCNC: 138 MMOL/L (ref 136–145)
SODIUM BLD-SCNC: 139 MMOL/L (ref 136–145)
SODIUM BLD-SCNC: 140 MMOL/L (ref 136–145)
SODIUM BLD-SCNC: 140 MMOL/L (ref 136–145)
SODIUM BLD-SCNC: 142 MMOL/L (ref 136–145)
SODIUM BLD-SCNC: 142 MMOL/L (ref 136–145)
SODIUM BLD-SCNC: 143 MMOL/L (ref 136–145)
SODIUM BLD-SCNC: 143 MMOL/L (ref 136–145)
SODIUM SERPL-SCNC: 141 MMOL/L (ref 136–145)
SP GR UR STRIP: 1.02 (ref 1–1.03)
SQUAMOUS #/AREA URNS HPF: NORMAL /HPF
STRESS BASELINE BP: NORMAL MMHG
STRESS BASELINE HR: 88 BPM
STRESS PERCENT HR: 106 %
STRESS POST ESTIMATED WORKLOAD: 7 METS
STRESS POST EXERCISE DUR MIN: 6 MIN
STRESS POST EXERCISE DUR SEC: 0 SEC
STRESS POST PEAK BP: NORMAL MMHG
STRESS POST PEAK HR: 135 BPM
STRESS TARGET HR: 128 BPM
T&S EXPIRATION DATE: NORMAL
TOTAL RATE: 12 BREATHS/MINUTE
TOTAL RATE: 28 BREATHS/MINUTE
TRIGL SERPL-MCNC: 46 MG/DL (ref 0–150)
TRIGL SERPL-MCNC: 60 MG/DL (ref 0–150)
UNIT  ABO: NORMAL
UNIT  RH: NORMAL
UROBILINOGEN UR QL STRIP: ABNORMAL
VENTILATOR MODE: ABNORMAL
VENTILATOR MODE: AC
VLDLC SERPL CALC-MCNC: 9.2 MG/DL (ref 5–40)
VLDLC SERPL-MCNC: 12 MG/DL (ref 5–40)
VT ON VENT VENT: 700 ML
WBC NRBC COR # BLD: 10.29 10*3/MM3 (ref 4.5–10.7)
WBC NRBC COR # BLD: 10.43 10*3/MM3 (ref 4.5–10.7)
WBC NRBC COR # BLD: 10.84 10*3/MM3 (ref 4.5–10.7)
WBC NRBC COR # BLD: 11 10*3/MM3 (ref 4.5–10.7)
WBC NRBC COR # BLD: 11.91 10*3/MM3 (ref 4.5–10.7)
WBC NRBC COR # BLD: 11.97 10*3/MM3 (ref 4.5–10.7)
WBC NRBC COR # BLD: 12.46 10*3/MM3 (ref 4.5–10.7)
WBC NRBC COR # BLD: 14.72 10*3/MM3 (ref 4.5–10.7)
WBC NRBC COR # BLD: 9.09 10*3/MM3 (ref 4.5–10.7)
WBC NRBC COR # BLD: 9.57 10*3/MM3 (ref 4.5–10.7)
WBC UR QL AUTO: NORMAL /HPF

## 2018-01-01 PROCEDURE — 82962 GLUCOSE BLOOD TEST: CPT

## 2018-01-01 PROCEDURE — 94799 UNLISTED PULMONARY SVC/PX: CPT

## 2018-01-01 PROCEDURE — 25010000003 CEFAZOLIN IN DEXTROSE 2-4 GM/100ML-% SOLUTION: Performed by: PHYSICIAN ASSISTANT

## 2018-01-01 PROCEDURE — 33523 CABG ART-VEIN SIX OR MORE: CPT | Performed by: THORACIC SURGERY (CARDIOTHORACIC VASCULAR SURGERY)

## 2018-01-01 PROCEDURE — 85027 COMPLETE CBC AUTOMATED: CPT | Performed by: THORACIC SURGERY (CARDIOTHORACIC VASCULAR SURGERY)

## 2018-01-01 PROCEDURE — 93005 ELECTROCARDIOGRAM TRACING: CPT | Performed by: INTERNAL MEDICINE

## 2018-01-01 PROCEDURE — 99233 SBSQ HOSP IP/OBS HIGH 50: CPT | Performed by: INTERNAL MEDICINE

## 2018-01-01 PROCEDURE — 78452 HT MUSCLE IMAGE SPECT MULT: CPT

## 2018-01-01 PROCEDURE — 33508 ENDOSCOPIC VEIN HARVEST: CPT | Performed by: THORACIC SURGERY (CARDIOTHORACIC VASCULAR SURGERY)

## 2018-01-01 PROCEDURE — 80048 BASIC METABOLIC PNL TOTAL CA: CPT | Performed by: THORACIC SURGERY (CARDIOTHORACIC VASCULAR SURGERY)

## 2018-01-01 PROCEDURE — 71046 X-RAY EXAM CHEST 2 VIEWS: CPT

## 2018-01-01 PROCEDURE — 97110 THERAPEUTIC EXERCISES: CPT

## 2018-01-01 PROCEDURE — 73502 X-RAY EXAM HIP UNI 2-3 VIEWS: CPT

## 2018-01-01 PROCEDURE — 99024 POSTOP FOLLOW-UP VISIT: CPT | Performed by: NURSE PRACTITIONER

## 2018-01-01 PROCEDURE — 36600 WITHDRAWAL OF ARTERIAL BLOOD: CPT

## 2018-01-01 PROCEDURE — 25010000002 MAGNESIUM SULFATE IN D5W 1G/100ML (PREMIX) 1-5 GM/100ML-% SOLUTION: Performed by: THORACIC SURGERY (CARDIOTHORACIC VASCULAR SURGERY)

## 2018-01-01 PROCEDURE — 93000 ELECTROCARDIOGRAM COMPLETE: CPT | Performed by: INTERNAL MEDICINE

## 2018-01-01 PROCEDURE — C1894 INTRO/SHEATH, NON-LASER: HCPCS | Performed by: INTERNAL MEDICINE

## 2018-01-01 PROCEDURE — 25010000002 HEPARIN (PORCINE) PER 1000 UNITS: Performed by: INTERNAL MEDICINE

## 2018-01-01 PROCEDURE — 25010000002 PROPOFOL 1000 MG/ML EMULSION: Performed by: THORACIC SURGERY (CARDIOTHORACIC VASCULAR SURGERY)

## 2018-01-01 PROCEDURE — 94002 VENT MGMT INPAT INIT DAY: CPT

## 2018-01-01 PROCEDURE — 85018 HEMOGLOBIN: CPT

## 2018-01-01 PROCEDURE — 99232 SBSQ HOSP IP/OBS MODERATE 35: CPT | Performed by: NURSE PRACTITIONER

## 2018-01-01 PROCEDURE — 85014 HEMATOCRIT: CPT

## 2018-01-01 PROCEDURE — 97110 THERAPEUTIC EXERCISES: CPT | Performed by: PHYSICAL THERAPIST

## 2018-01-01 PROCEDURE — 71045 X-RAY EXAM CHEST 1 VIEW: CPT

## 2018-01-01 PROCEDURE — 82947 ASSAY GLUCOSE BLOOD QUANT: CPT

## 2018-01-01 PROCEDURE — 83735 ASSAY OF MAGNESIUM: CPT | Performed by: THORACIC SURGERY (CARDIOTHORACIC VASCULAR SURGERY)

## 2018-01-01 PROCEDURE — P9041 ALBUMIN (HUMAN),5%, 50ML: HCPCS | Performed by: THORACIC SURGERY (CARDIOTHORACIC VASCULAR SURGERY)

## 2018-01-01 PROCEDURE — 83880 ASSAY OF NATRIURETIC PEPTIDE: CPT | Performed by: PHYSICIAN ASSISTANT

## 2018-01-01 PROCEDURE — 74220 X-RAY XM ESOPHAGUS 1CNTRST: CPT

## 2018-01-01 PROCEDURE — B2151ZZ FLUOROSCOPY OF LEFT HEART USING LOW OSMOLAR CONTRAST: ICD-10-PCS | Performed by: INTERNAL MEDICINE

## 2018-01-01 PROCEDURE — 25010000003 CEFAZOLIN IN DEXTROSE 2-4 GM/100ML-% SOLUTION: Performed by: THORACIC SURGERY (CARDIOTHORACIC VASCULAR SURGERY)

## 2018-01-01 PROCEDURE — 82330 ASSAY OF CALCIUM: CPT | Performed by: THORACIC SURGERY (CARDIOTHORACIC VASCULAR SURGERY)

## 2018-01-01 PROCEDURE — 33533 CABG ARTERIAL SINGLE: CPT | Performed by: THORACIC SURGERY (CARDIOTHORACIC VASCULAR SURGERY)

## 2018-01-01 PROCEDURE — B2111ZZ FLUOROSCOPY OF MULTIPLE CORONARY ARTERIES USING LOW OSMOLAR CONTRAST: ICD-10-PCS | Performed by: INTERNAL MEDICINE

## 2018-01-01 PROCEDURE — 93010 ELECTROCARDIOGRAM REPORT: CPT | Performed by: INTERNAL MEDICINE

## 2018-01-01 PROCEDURE — 90662 IIV NO PRSV INCREASED AG IM: CPT | Performed by: FAMILY MEDICINE

## 2018-01-01 PROCEDURE — 36430 TRANSFUSION BLD/BLD COMPNT: CPT

## 2018-01-01 PROCEDURE — 93017 CV STRESS TEST TRACING ONLY: CPT

## 2018-01-01 PROCEDURE — C1729 CATH, DRAINAGE: HCPCS | Performed by: THORACIC SURGERY (CARDIOTHORACIC VASCULAR SURGERY)

## 2018-01-01 PROCEDURE — 4A023N8 MEASUREMENT OF CARDIAC SAMPLING AND PRESSURE, BILATERAL, PERCUTANEOUS APPROACH: ICD-10-PCS | Performed by: INTERNAL MEDICINE

## 2018-01-01 PROCEDURE — 99222 1ST HOSP IP/OBS MODERATE 55: CPT | Performed by: PHYSICIAN ASSISTANT

## 2018-01-01 PROCEDURE — 0 TECHNETIUM TETROFOSMIN KIT: Performed by: INTERNAL MEDICINE

## 2018-01-01 PROCEDURE — 99232 SBSQ HOSP IP/OBS MODERATE 35: CPT | Performed by: INTERNAL MEDICINE

## 2018-01-01 PROCEDURE — 25010000002 HEPARIN (PORCINE) PER 1000 UNITS

## 2018-01-01 PROCEDURE — 93460 R&L HRT ART/VENTRICLE ANGIO: CPT | Performed by: INTERNAL MEDICINE

## 2018-01-01 PROCEDURE — 80048 BASIC METABOLIC PNL TOTAL CA: CPT

## 2018-01-01 PROCEDURE — 94060 EVALUATION OF WHEEZING: CPT

## 2018-01-01 PROCEDURE — 25010000002 MAGNESIUM SULFATE IN D5W 1G/100ML (PREMIX) 1-5 GM/100ML-% SOLUTION: Performed by: NURSE PRACTITIONER

## 2018-01-01 PROCEDURE — 63710000001 INSULIN LISPRO (HUMAN) PER 5 UNITS: Performed by: THORACIC SURGERY (CARDIOTHORACIC VASCULAR SURGERY)

## 2018-01-01 PROCEDURE — 25010000002 ENOXAPARIN PER 10 MG: Performed by: THORACIC SURGERY (CARDIOTHORACIC VASCULAR SURGERY)

## 2018-01-01 PROCEDURE — 06BQ4ZZ EXCISION OF LEFT SAPHENOUS VEIN, PERCUTANEOUS ENDOSCOPIC APPROACH: ICD-10-PCS | Performed by: THORACIC SURGERY (CARDIOTHORACIC VASCULAR SURGERY)

## 2018-01-01 PROCEDURE — 93016 CV STRESS TEST SUPVJ ONLY: CPT | Performed by: INTERNAL MEDICINE

## 2018-01-01 PROCEDURE — 86920 COMPATIBILITY TEST SPIN: CPT

## 2018-01-01 PROCEDURE — 25010000002 METOCLOPRAMIDE PER 10 MG: Performed by: THORACIC SURGERY (CARDIOTHORACIC VASCULAR SURGERY)

## 2018-01-01 PROCEDURE — 99203 OFFICE O/P NEW LOW 30 MIN: CPT | Performed by: INTERNAL MEDICINE

## 2018-01-01 PROCEDURE — 25010000002 PROPOFOL 10 MG/ML EMULSION: Performed by: ANESTHESIOLOGY

## 2018-01-01 PROCEDURE — 25010000003 PROTAMINE SULFATE PER 10 MG: Performed by: THORACIC SURGERY (CARDIOTHORACIC VASCULAR SURGERY)

## 2018-01-01 PROCEDURE — 74018 RADEX ABDOMEN 1 VIEW: CPT

## 2018-01-01 PROCEDURE — 36415 COLL VENOUS BLD VENIPUNCTURE: CPT

## 2018-01-01 PROCEDURE — 82803 BLOOD GASES ANY COMBINATION: CPT

## 2018-01-01 PROCEDURE — 93005 ELECTROCARDIOGRAM TRACING: CPT | Performed by: THORACIC SURGERY (CARDIOTHORACIC VASCULAR SURGERY)

## 2018-01-01 PROCEDURE — 02100A9 BYPASS CORONARY ARTERY, ONE ARTERY FROM LEFT INTERNAL MAMMARY WITH AUTOLOGOUS ARTERIAL TISSUE, OPEN APPROACH: ICD-10-PCS | Performed by: THORACIC SURGERY (CARDIOTHORACIC VASCULAR SURGERY)

## 2018-01-01 PROCEDURE — 86900 BLOOD TYPING SEROLOGIC ABO: CPT | Performed by: PHYSICIAN ASSISTANT

## 2018-01-01 PROCEDURE — 25010000002 ALBUMIN HUMAN 5% PER 50 ML

## 2018-01-01 PROCEDURE — 97162 PT EVAL MOD COMPLEX 30 MIN: CPT

## 2018-01-01 PROCEDURE — 25010000002 MIDAZOLAM PER 1 MG: Performed by: ANESTHESIOLOGY

## 2018-01-01 PROCEDURE — G8978 MOBILITY CURRENT STATUS: HCPCS | Performed by: PHYSICAL THERAPIST

## 2018-01-01 PROCEDURE — 93018 CV STRESS TEST I&R ONLY: CPT | Performed by: INTERNAL MEDICINE

## 2018-01-01 PROCEDURE — G8980 MOBILITY D/C STATUS: HCPCS | Performed by: PHYSICAL THERAPIST

## 2018-01-01 PROCEDURE — C1769 GUIDE WIRE: HCPCS | Performed by: INTERNAL MEDICINE

## 2018-01-01 PROCEDURE — 86901 BLOOD TYPING SEROLOGIC RH(D): CPT | Performed by: PHYSICIAN ASSISTANT

## 2018-01-01 PROCEDURE — 94640 AIRWAY INHALATION TREATMENT: CPT

## 2018-01-01 PROCEDURE — 93970 EXTREMITY STUDY: CPT

## 2018-01-01 PROCEDURE — 83036 HEMOGLOBIN GLYCOSYLATED A1C: CPT | Performed by: PHYSICIAN ASSISTANT

## 2018-01-01 PROCEDURE — C1751 CATH, INF, PER/CENT/MIDLINE: HCPCS | Performed by: ANESTHESIOLOGY

## 2018-01-01 PROCEDURE — 3E080GC INTRODUCTION OF OTHER THERAPEUTIC SUBSTANCE INTO HEART, OPEN APPROACH: ICD-10-PCS | Performed by: THORACIC SURGERY (CARDIOTHORACIC VASCULAR SURGERY)

## 2018-01-01 PROCEDURE — 85610 PROTHROMBIN TIME: CPT | Performed by: THORACIC SURGERY (CARDIOTHORACIC VASCULAR SURGERY)

## 2018-01-01 PROCEDURE — 85576 BLOOD PLATELET AGGREGATION: CPT | Performed by: PHYSICIAN ASSISTANT

## 2018-01-01 PROCEDURE — 85347 COAGULATION TIME ACTIVATED: CPT

## 2018-01-01 PROCEDURE — 99239 HOSP IP/OBS DSCHRG MGMT >30: CPT | Performed by: NURSE PRACTITIONER

## 2018-01-01 PROCEDURE — 0 IOPAMIDOL PER 1 ML: Performed by: INTERNAL MEDICINE

## 2018-01-01 PROCEDURE — P9035 PLATELET PHERES LEUKOREDUCED: HCPCS

## 2018-01-01 PROCEDURE — 25010000002 MAGNESIUM SULFATE PER 500 MG OF MAGNESIUM: Performed by: ANESTHESIOLOGY

## 2018-01-01 PROCEDURE — P9047 ALBUMIN (HUMAN), 25%, 50ML: HCPCS

## 2018-01-01 PROCEDURE — 85384 FIBRINOGEN ACTIVITY: CPT | Performed by: THORACIC SURGERY (CARDIOTHORACIC VASCULAR SURGERY)

## 2018-01-01 PROCEDURE — 93000 ELECTROCARDIOGRAM COMPLETE: CPT | Performed by: FAMILY MEDICINE

## 2018-01-01 PROCEDURE — C1713 ANCHOR/SCREW BN/BN,TIS/BN: HCPCS | Performed by: THORACIC SURGERY (CARDIOTHORACIC VASCULAR SURGERY)

## 2018-01-01 PROCEDURE — 25010000002 PHENYLEPHRINE 10 MG/ML SOLUTION 5 ML VIAL: Performed by: THORACIC SURGERY (CARDIOTHORACIC VASCULAR SURGERY)

## 2018-01-01 PROCEDURE — 86901 BLOOD TYPING SEROLOGIC RH(D): CPT

## 2018-01-01 PROCEDURE — 78452 HT MUSCLE IMAGE SPECT MULT: CPT | Performed by: INTERNAL MEDICINE

## 2018-01-01 PROCEDURE — 25010000002 MORPHINE (PF) 10 MG/ML SOLUTION: Performed by: THORACIC SURGERY (CARDIOTHORACIC VASCULAR SURGERY)

## 2018-01-01 PROCEDURE — 86850 RBC ANTIBODY SCREEN: CPT | Performed by: PHYSICIAN ASSISTANT

## 2018-01-01 PROCEDURE — 94762 N-INVAS EAR/PLS OXIMTRY CONT: CPT

## 2018-01-01 PROCEDURE — 97161 PT EVAL LOW COMPLEX 20 MIN: CPT | Performed by: PHYSICAL THERAPIST

## 2018-01-01 PROCEDURE — 25010000002 ALBUMIN HUMAN 25% PER 50 ML

## 2018-01-01 PROCEDURE — 25010000002 ONDANSETRON PER 1 MG: Performed by: ANESTHESIOLOGY

## 2018-01-01 PROCEDURE — 86900 BLOOD TYPING SEROLOGIC ABO: CPT

## 2018-01-01 PROCEDURE — 5A1221Z PERFORMANCE OF CARDIAC OUTPUT, CONTINUOUS: ICD-10-PCS | Performed by: THORACIC SURGERY (CARDIOTHORACIC VASCULAR SURGERY)

## 2018-01-01 PROCEDURE — 84132 ASSAY OF SERUM POTASSIUM: CPT | Performed by: THORACIC SURGERY (CARDIOTHORACIC VASCULAR SURGERY)

## 2018-01-01 PROCEDURE — 85730 THROMBOPLASTIN TIME PARTIAL: CPT | Performed by: THORACIC SURGERY (CARDIOTHORACIC VASCULAR SURGERY)

## 2018-01-01 PROCEDURE — 85025 COMPLETE CBC W/AUTO DIFF WBC: CPT | Performed by: THORACIC SURGERY (CARDIOTHORACIC VASCULAR SURGERY)

## 2018-01-01 PROCEDURE — 93318 ECHO TRANSESOPHAGEAL INTRAOP: CPT

## 2018-01-01 PROCEDURE — 85025 COMPLETE CBC W/AUTO DIFF WBC: CPT

## 2018-01-01 PROCEDURE — G0008 ADMIN INFLUENZA VIRUS VAC: HCPCS | Performed by: FAMILY MEDICINE

## 2018-01-01 PROCEDURE — 80069 RENAL FUNCTION PANEL: CPT | Performed by: THORACIC SURGERY (CARDIOTHORACIC VASCULAR SURGERY)

## 2018-01-01 PROCEDURE — P9041 ALBUMIN (HUMAN),5%, 50ML: HCPCS

## 2018-01-01 PROCEDURE — 94003 VENT MGMT INPAT SUBQ DAY: CPT

## 2018-01-01 PROCEDURE — A4648 IMPLANTABLE TISSUE MARKER: HCPCS | Performed by: THORACIC SURGERY (CARDIOTHORACIC VASCULAR SURGERY)

## 2018-01-01 PROCEDURE — 93880 EXTRACRANIAL BILAT STUDY: CPT

## 2018-01-01 PROCEDURE — 81001 URINALYSIS AUTO W/SCOPE: CPT | Performed by: PHYSICIAN ASSISTANT

## 2018-01-01 PROCEDURE — 83735 ASSAY OF MAGNESIUM: CPT | Performed by: NURSE PRACTITIONER

## 2018-01-01 PROCEDURE — 0213093 BYPASS CORONARY ARTERY, FOUR OR MORE ARTERIES FROM CORONARY ARTERY WITH AUTOLOGOUS VENOUS TISSUE, OPEN APPROACH: ICD-10-PCS | Performed by: THORACIC SURGERY (CARDIOTHORACIC VASCULAR SURGERY)

## 2018-01-01 PROCEDURE — A9502 TC99M TETROFOSMIN: HCPCS | Performed by: INTERNAL MEDICINE

## 2018-01-01 PROCEDURE — 25010000002 HEPARIN (PORCINE) PER 1000 UNITS: Performed by: THORACIC SURGERY (CARDIOTHORACIC VASCULAR SURGERY)

## 2018-01-01 PROCEDURE — 25010000002 FUROSEMIDE PER 20 MG: Performed by: NURSE PRACTITIONER

## 2018-01-01 PROCEDURE — 80048 BASIC METABOLIC PNL TOTAL CA: CPT | Performed by: INTERNAL MEDICINE

## 2018-01-01 PROCEDURE — 94618 PULMONARY STRESS TESTING: CPT

## 2018-01-01 PROCEDURE — 25010000002 PHENYLEPHRINE PER 1 ML: Performed by: ANESTHESIOLOGY

## 2018-01-01 PROCEDURE — B246ZZ4 ULTRASONOGRAPHY OF RIGHT AND LEFT HEART, TRANSESOPHAGEAL: ICD-10-PCS | Performed by: THORACIC SURGERY (CARDIOTHORACIC VASCULAR SURGERY)

## 2018-01-01 PROCEDURE — 25010000002 HEPARIN (PORCINE) PER 1000 UNITS: Performed by: ANESTHESIOLOGY

## 2018-01-01 PROCEDURE — 99215 OFFICE O/P EST HI 40 MIN: CPT | Performed by: FAMILY MEDICINE

## 2018-01-01 PROCEDURE — 99213 OFFICE O/P EST LOW 20 MIN: CPT | Performed by: FAMILY MEDICINE

## 2018-01-01 PROCEDURE — 80061 LIPID PANEL: CPT | Performed by: PHYSICIAN ASSISTANT

## 2018-01-01 PROCEDURE — 25010000002 ALBUMIN HUMAN 5% PER 50 ML: Performed by: THORACIC SURGERY (CARDIOTHORACIC VASCULAR SURGERY)

## 2018-01-01 PROCEDURE — 25010000002 PAPAVERINE PER 60 MG: Performed by: THORACIC SURGERY (CARDIOTHORACIC VASCULAR SURGERY)

## 2018-01-01 DEVICE — SS SUTURE, 3 PER SLEEVE
Type: IMPLANTABLE DEVICE | Site: CHEST | Status: FUNCTIONAL
Brand: MYO/WIRE II

## 2018-01-01 DEVICE — SS SUTURE, 4 PER SLEEVE
Type: IMPLANTABLE DEVICE | Site: CHEST | Status: FUNCTIONAL
Brand: MYO/WIRE II

## 2018-01-01 RX ORDER — ROCURONIUM BROMIDE 10 MG/ML
INJECTION, SOLUTION INTRAVENOUS AS NEEDED
Status: DISCONTINUED | OUTPATIENT
Start: 2018-01-01 | End: 2018-01-01 | Stop reason: SURG

## 2018-01-01 RX ORDER — BUDESONIDE AND FORMOTEROL FUMARATE DIHYDRATE 80; 4.5 UG/1; UG/1
2 AEROSOL RESPIRATORY (INHALATION)
Status: DISCONTINUED | OUTPATIENT
Start: 2018-01-01 | End: 2018-01-01

## 2018-01-01 RX ORDER — POTASSIUM CHLORIDE 29.8 MG/ML
20 INJECTION INTRAVENOUS
Status: DISCONTINUED | OUTPATIENT
Start: 2018-01-01 | End: 2018-01-01 | Stop reason: HOSPADM

## 2018-01-01 RX ORDER — DOPAMINE HYDROCHLORIDE 160 MG/100ML
2-20 INJECTION, SOLUTION INTRAVENOUS CONTINUOUS PRN
Status: DISCONTINUED | OUTPATIENT
Start: 2018-01-01 | End: 2018-01-01

## 2018-01-01 RX ORDER — BUDESONIDE AND FORMOTEROL FUMARATE DIHYDRATE 160; 4.5 UG/1; UG/1
2 AEROSOL RESPIRATORY (INHALATION)
Status: DISCONTINUED | OUTPATIENT
Start: 2018-01-01 | End: 2018-01-01 | Stop reason: HOSPADM

## 2018-01-01 RX ORDER — ONDANSETRON 2 MG/ML
4 INJECTION INTRAMUSCULAR; INTRAVENOUS EVERY 6 HOURS PRN
Status: DISCONTINUED | OUTPATIENT
Start: 2018-01-01 | End: 2018-01-01 | Stop reason: HOSPADM

## 2018-01-01 RX ORDER — SODIUM CHLORIDE 0.9 % (FLUSH) 0.9 %
30 SYRINGE (ML) INJECTION ONCE AS NEEDED
Status: DISCONTINUED | OUTPATIENT
Start: 2018-01-01 | End: 2018-01-01 | Stop reason: HOSPADM

## 2018-01-01 RX ORDER — MEPERIDINE HYDROCHLORIDE 25 MG/ML
25 INJECTION INTRAMUSCULAR; INTRAVENOUS; SUBCUTANEOUS EVERY 4 HOURS PRN
Status: DISPENSED | OUTPATIENT
Start: 2018-01-01 | End: 2018-01-01

## 2018-01-01 RX ORDER — NICOTINE 21 MG/24HR
1 PATCH, TRANSDERMAL 24 HOURS TRANSDERMAL
Status: DISCONTINUED | OUTPATIENT
Start: 2018-01-01 | End: 2018-01-01

## 2018-01-01 RX ORDER — PROPOFOL 10 MG/ML
VIAL (ML) INTRAVENOUS CONTINUOUS PRN
Status: DISCONTINUED | OUTPATIENT
Start: 2018-01-01 | End: 2018-01-01 | Stop reason: SURG

## 2018-01-01 RX ORDER — BUMETANIDE 2 MG/1
2 TABLET ORAL DAILY
Status: DISCONTINUED | OUTPATIENT
Start: 2018-01-01 | End: 2018-01-01

## 2018-01-01 RX ORDER — NOREPINEPHRINE BIT/0.9 % NACL 8 MG/250ML
.02-.3 INFUSION BOTTLE (ML) INTRAVENOUS CONTINUOUS PRN
Status: DISCONTINUED | OUTPATIENT
Start: 2018-01-01 | End: 2018-01-01

## 2018-01-01 RX ORDER — LIDOCAINE HYDROCHLORIDE 10 MG/ML
0.1 INJECTION, SOLUTION EPIDURAL; INFILTRATION; INTRACAUDAL; PERINEURAL ONCE AS NEEDED
Status: DISCONTINUED | OUTPATIENT
Start: 2018-01-01 | End: 2018-01-01

## 2018-01-01 RX ORDER — MIDAZOLAM HYDROCHLORIDE 1 MG/ML
INJECTION INTRAMUSCULAR; INTRAVENOUS AS NEEDED
Status: DISCONTINUED | OUTPATIENT
Start: 2018-01-01 | End: 2018-01-01 | Stop reason: SURG

## 2018-01-01 RX ORDER — FUROSEMIDE 80 MG
80 TABLET ORAL DAILY
Status: DISCONTINUED | OUTPATIENT
Start: 2018-01-01 | End: 2018-01-01 | Stop reason: HOSPADM

## 2018-01-01 RX ORDER — CHLORHEXIDINE GLUCONATE 0.12 MG/ML
15 RINSE ORAL ONCE
Status: DISCONTINUED | OUTPATIENT
Start: 2018-01-01 | End: 2018-01-01 | Stop reason: HOSPADM

## 2018-01-01 RX ORDER — SODIUM CHLORIDE 9 MG/ML
30 INJECTION, SOLUTION INTRAVENOUS CONTINUOUS PRN
Status: DISCONTINUED | OUTPATIENT
Start: 2018-01-01 | End: 2018-01-01

## 2018-01-01 RX ORDER — BUMETANIDE 0.25 MG/ML
2 INJECTION INTRAMUSCULAR; INTRAVENOUS ONCE
Status: COMPLETED | OUTPATIENT
Start: 2018-01-01 | End: 2018-01-01

## 2018-01-01 RX ORDER — LIDOCAINE HYDROCHLORIDE 20 MG/ML
INJECTION, SOLUTION INFILTRATION; PERINEURAL AS NEEDED
Status: DISCONTINUED | OUTPATIENT
Start: 2018-01-01 | End: 2018-01-01 | Stop reason: HOSPADM

## 2018-01-01 RX ORDER — POTASSIUM CHLORIDE 20 MEQ/1
40 TABLET, EXTENDED RELEASE ORAL DAILY
Qty: 30 TABLET | Refills: 11 | Status: ON HOLD | OUTPATIENT
Start: 2018-01-01 | End: 2019-01-01 | Stop reason: SDUPTHER

## 2018-01-01 RX ORDER — PROPOFOL 10 MG/ML
VIAL (ML) INTRAVENOUS AS NEEDED
Status: DISCONTINUED | OUTPATIENT
Start: 2018-01-01 | End: 2018-01-01 | Stop reason: SURG

## 2018-01-01 RX ORDER — CHLORHEXIDINE GLUCONATE 0.12 MG/ML
15 RINSE ORAL EVERY 12 HOURS SCHEDULED
Status: COMPLETED | OUTPATIENT
Start: 2018-01-01 | End: 2018-01-01

## 2018-01-01 RX ORDER — POTASSIUM CHLORIDE 7.45 MG/ML
10 INJECTION INTRAVENOUS
Status: DISCONTINUED | OUTPATIENT
Start: 2018-01-01 | End: 2018-01-01 | Stop reason: HOSPADM

## 2018-01-01 RX ORDER — ASPIRIN 81 MG/1
81 TABLET ORAL DAILY
Status: DISCONTINUED | OUTPATIENT
Start: 2018-01-01 | End: 2018-01-01

## 2018-01-01 RX ORDER — SENNA AND DOCUSATE SODIUM 50; 8.6 MG/1; MG/1
2 TABLET, FILM COATED ORAL NIGHTLY
Status: DISCONTINUED | OUTPATIENT
Start: 2018-01-01 | End: 2018-01-01 | Stop reason: HOSPADM

## 2018-01-01 RX ORDER — CHLORHEXIDINE GLUCONATE 0.12 MG/ML
15 RINSE ORAL EVERY 12 HOURS
Status: DISCONTINUED | OUTPATIENT
Start: 2018-01-01 | End: 2018-01-01

## 2018-01-01 RX ORDER — ALBUMIN, HUMAN INJ 5% 5 %
1500 SOLUTION INTRAVENOUS AS NEEDED
Status: DISCONTINUED | OUTPATIENT
Start: 2018-01-01 | End: 2018-01-01

## 2018-01-01 RX ORDER — TAMSULOSIN HYDROCHLORIDE 0.4 MG/1
0.4 CAPSULE ORAL DAILY
Status: DISCONTINUED | OUTPATIENT
Start: 2018-01-01 | End: 2018-01-01 | Stop reason: HOSPADM

## 2018-01-01 RX ORDER — CHLORHEXIDINE GLUCONATE 500 MG/1
1 CLOTH TOPICAL EVERY 12 HOURS PRN
Status: DISCONTINUED | OUTPATIENT
Start: 2018-01-01 | End: 2018-01-01 | Stop reason: HOSPADM

## 2018-01-01 RX ORDER — SUFENTANIL CITRATE 50 UG/ML
INJECTION EPIDURAL; INTRAVENOUS AS NEEDED
Status: DISCONTINUED | OUTPATIENT
Start: 2018-01-01 | End: 2018-01-01 | Stop reason: SURG

## 2018-01-01 RX ORDER — CARVEDILOL 6.25 MG/1
6.25 TABLET ORAL EVERY 12 HOURS
Qty: 60 TABLET | Refills: 1 | Status: SHIPPED | OUTPATIENT
Start: 2018-01-01 | End: 2019-01-01

## 2018-01-01 RX ORDER — OXYCODONE HYDROCHLORIDE 5 MG/1
10 TABLET ORAL EVERY 4 HOURS PRN
Status: DISCONTINUED | OUTPATIENT
Start: 2018-01-01 | End: 2018-01-01 | Stop reason: HOSPADM

## 2018-01-01 RX ORDER — SENNA AND DOCUSATE SODIUM 50; 8.6 MG/1; MG/1
2 TABLET, FILM COATED ORAL NIGHTLY
Qty: 60 TABLET | Refills: 0 | Status: SHIPPED | OUTPATIENT
Start: 2018-01-01

## 2018-01-01 RX ORDER — BISACODYL 5 MG/1
10 TABLET, DELAYED RELEASE ORAL DAILY PRN
Status: DISCONTINUED | OUTPATIENT
Start: 2018-01-01 | End: 2018-01-01 | Stop reason: HOSPADM

## 2018-01-01 RX ORDER — ATORVASTATIN CALCIUM 10 MG/1
10 TABLET, FILM COATED ORAL DAILY
Refills: 3 | COMMUNITY
Start: 2018-01-01 | End: 2018-01-01 | Stop reason: HOSPADM

## 2018-01-01 RX ORDER — SODIUM CHLORIDE 9 MG/ML
100 INJECTION, SOLUTION INTRAVENOUS CONTINUOUS
Status: DISCONTINUED | OUTPATIENT
Start: 2018-01-01 | End: 2018-01-01

## 2018-01-01 RX ORDER — HYDROCODONE BITARTRATE AND ACETAMINOPHEN 5; 325 MG/1; MG/1
2 TABLET ORAL EVERY 4 HOURS PRN
Qty: 60 TABLET | Refills: 0 | Status: SHIPPED | OUTPATIENT
Start: 2018-01-01 | End: 2018-01-01

## 2018-01-01 RX ORDER — SODIUM CHLORIDE 9 MG/ML
INJECTION, SOLUTION INTRAVENOUS CONTINUOUS PRN
Status: DISCONTINUED | OUTPATIENT
Start: 2018-01-01 | End: 2018-01-01 | Stop reason: SURG

## 2018-01-01 RX ORDER — MORPHINE SULFATE 2 MG/ML
4 INJECTION, SOLUTION INTRAMUSCULAR; INTRAVENOUS
Status: DISCONTINUED | OUTPATIENT
Start: 2018-01-01 | End: 2018-01-01

## 2018-01-01 RX ORDER — AMINOCAPROIC ACID 250 MG/ML
INJECTION, SOLUTION INTRAVENOUS AS NEEDED
Status: DISCONTINUED | OUTPATIENT
Start: 2018-01-01 | End: 2018-01-01 | Stop reason: SURG

## 2018-01-01 RX ORDER — SODIUM CHLORIDE 9 MG/ML
75 INJECTION, SOLUTION INTRAVENOUS CONTINUOUS
Status: DISCONTINUED | OUTPATIENT
Start: 2018-01-01 | End: 2018-01-01

## 2018-01-01 RX ORDER — ATORVASTATIN CALCIUM 10 MG/1
10 TABLET, FILM COATED ORAL DAILY
Status: DISCONTINUED | OUTPATIENT
Start: 2018-01-01 | End: 2018-01-01

## 2018-01-01 RX ORDER — SODIUM CHLORIDE 0.9 % (FLUSH) 0.9 %
3 SYRINGE (ML) INJECTION EVERY 12 HOURS SCHEDULED
Status: DISCONTINUED | OUTPATIENT
Start: 2018-01-01 | End: 2018-01-01

## 2018-01-01 RX ORDER — ACETAMINOPHEN 325 MG/1
650 TABLET ORAL EVERY 4 HOURS PRN
Status: DISCONTINUED | OUTPATIENT
Start: 2018-01-01 | End: 2018-01-01 | Stop reason: HOSPADM

## 2018-01-01 RX ORDER — ZOLPIDEM TARTRATE 10 MG/1
10 TABLET ORAL ONCE
Qty: 1 TABLET | Refills: 0 | Status: SHIPPED | OUTPATIENT
Start: 2018-01-01 | End: 2018-01-01

## 2018-01-01 RX ORDER — MAGNESIUM SULFATE 1 G/100ML
1 INJECTION INTRAVENOUS ONCE
Status: COMPLETED | OUTPATIENT
Start: 2018-01-01 | End: 2018-01-01

## 2018-01-01 RX ORDER — PAPAVERINE HYDROCHLORIDE 30 MG/ML
INJECTION INTRAMUSCULAR; INTRAVENOUS AS NEEDED
Status: DISCONTINUED | OUTPATIENT
Start: 2018-01-01 | End: 2018-01-01 | Stop reason: HOSPADM

## 2018-01-01 RX ORDER — POTASSIUM CHLORIDE 1.5 G/1.77G
40 POWDER, FOR SOLUTION ORAL AS NEEDED
Status: DISCONTINUED | OUTPATIENT
Start: 2018-01-01 | End: 2018-01-01 | Stop reason: HOSPADM

## 2018-01-01 RX ORDER — ASPIRIN 81 MG/1
81 TABLET ORAL DAILY
Status: DISCONTINUED | OUTPATIENT
Start: 2018-01-01 | End: 2018-01-01 | Stop reason: HOSPADM

## 2018-01-01 RX ORDER — AMLODIPINE BESYLATE 10 MG/1
10 TABLET ORAL DAILY
Status: DISCONTINUED | OUTPATIENT
Start: 2018-01-01 | End: 2018-01-01

## 2018-01-01 RX ORDER — AMIODARONE HYDROCHLORIDE 200 MG/1
400 TABLET ORAL EVERY 12 HOURS SCHEDULED
Status: DISCONTINUED | OUTPATIENT
Start: 2018-01-01 | End: 2018-01-01

## 2018-01-01 RX ORDER — METOCLOPRAMIDE HYDROCHLORIDE 5 MG/ML
10 INJECTION INTRAMUSCULAR; INTRAVENOUS EVERY 6 HOURS
Status: COMPLETED | OUTPATIENT
Start: 2018-01-01 | End: 2018-01-01

## 2018-01-01 RX ORDER — NICOTINE POLACRILEX 4 MG
15 LOZENGE BUCCAL
Status: DISCONTINUED | OUTPATIENT
Start: 2018-01-01 | End: 2018-01-01 | Stop reason: HOSPADM

## 2018-01-01 RX ORDER — PROMETHAZINE HYDROCHLORIDE 25 MG/ML
12.5 INJECTION, SOLUTION INTRAMUSCULAR; INTRAVENOUS EVERY 6 HOURS PRN
Status: DISCONTINUED | OUTPATIENT
Start: 2018-01-01 | End: 2018-01-01 | Stop reason: HOSPADM

## 2018-01-01 RX ORDER — NALOXONE HCL 0.4 MG/ML
0.4 VIAL (ML) INJECTION
Status: DISCONTINUED | OUTPATIENT
Start: 2018-01-01 | End: 2018-01-01

## 2018-01-01 RX ORDER — MIDAZOLAM HYDROCHLORIDE 1 MG/ML
2 INJECTION INTRAMUSCULAR; INTRAVENOUS
Status: DISCONTINUED | OUTPATIENT
Start: 2018-01-01 | End: 2018-01-01

## 2018-01-01 RX ORDER — PROMETHAZINE HYDROCHLORIDE 25 MG/1
12.5 TABLET ORAL EVERY 6 HOURS PRN
Status: DISCONTINUED | OUTPATIENT
Start: 2018-01-01 | End: 2018-01-01 | Stop reason: HOSPADM

## 2018-01-01 RX ORDER — AMIODARONE HYDROCHLORIDE 200 MG/1
200 TABLET ORAL EVERY 12 HOURS SCHEDULED
Status: DISCONTINUED | OUTPATIENT
Start: 2018-01-01 | End: 2018-01-01 | Stop reason: HOSPADM

## 2018-01-01 RX ORDER — MORPHINE SULFATE 2 MG/ML
1 INJECTION, SOLUTION INTRAMUSCULAR; INTRAVENOUS EVERY 4 HOURS PRN
Status: DISCONTINUED | OUTPATIENT
Start: 2018-01-01 | End: 2018-01-01

## 2018-01-01 RX ORDER — LOSARTAN POTASSIUM 25 MG/1
25 TABLET ORAL
Qty: 60 TABLET | Refills: 0 | Status: SHIPPED | OUTPATIENT
Start: 2018-01-01 | End: 2019-01-01 | Stop reason: DRUGHIGH

## 2018-01-01 RX ORDER — HEPARIN SODIUM 1000 [USP'U]/ML
INJECTION, SOLUTION INTRAVENOUS; SUBCUTANEOUS AS NEEDED
Status: DISCONTINUED | OUTPATIENT
Start: 2018-01-01 | End: 2018-01-01 | Stop reason: SURG

## 2018-01-01 RX ORDER — FUROSEMIDE 10 MG/ML
40 INJECTION INTRAMUSCULAR; INTRAVENOUS EVERY 6 HOURS PRN
Status: DISCONTINUED | OUTPATIENT
Start: 2018-01-01 | End: 2018-01-01

## 2018-01-01 RX ORDER — MAGNESIUM SULFATE HEPTAHYDRATE 500 MG/ML
INJECTION, SOLUTION INTRAMUSCULAR; INTRAVENOUS AS NEEDED
Status: DISCONTINUED | OUTPATIENT
Start: 2018-01-01 | End: 2018-01-01 | Stop reason: SURG

## 2018-01-01 RX ORDER — ALBUMIN, HUMAN INJ 5% 5 %
SOLUTION INTRAVENOUS
Status: COMPLETED
Start: 2018-01-01 | End: 2018-01-01

## 2018-01-01 RX ORDER — METOPROLOL SUCCINATE 25 MG/1
25 TABLET, EXTENDED RELEASE ORAL
Status: DISCONTINUED | OUTPATIENT
Start: 2018-01-01 | End: 2018-01-01

## 2018-01-01 RX ORDER — HYDROCODONE BITARTRATE AND ACETAMINOPHEN 5; 325 MG/1; MG/1
2 TABLET ORAL EVERY 4 HOURS PRN
Status: DISCONTINUED | OUTPATIENT
Start: 2018-01-01 | End: 2018-01-01 | Stop reason: HOSPADM

## 2018-01-01 RX ORDER — CEFAZOLIN SODIUM 2 G/100ML
2 INJECTION, SOLUTION INTRAVENOUS EVERY 8 HOURS
Status: COMPLETED | OUTPATIENT
Start: 2018-01-01 | End: 2018-01-01

## 2018-01-01 RX ORDER — ALBUTEROL SULFATE 2.5 MG/3ML
2.5 SOLUTION RESPIRATORY (INHALATION) ONCE AS NEEDED
Status: COMPLETED | OUTPATIENT
Start: 2018-01-01 | End: 2018-01-01

## 2018-01-01 RX ORDER — ACETAMINOPHEN 650 MG/1
650 SUPPOSITORY RECTAL EVERY 4 HOURS PRN
Status: DISCONTINUED | OUTPATIENT
Start: 2018-01-01 | End: 2018-01-01 | Stop reason: HOSPADM

## 2018-01-01 RX ORDER — BUDESONIDE AND FORMOTEROL FUMARATE DIHYDRATE 160; 4.5 UG/1; UG/1
2 AEROSOL RESPIRATORY (INHALATION)
Qty: 1 INHALER | Refills: 2 | Status: SHIPPED | OUTPATIENT
Start: 2018-01-01 | End: 2018-01-01

## 2018-01-01 RX ORDER — MILRINONE LACTATE 0.2 MG/ML
0.12 INJECTION, SOLUTION INTRAVENOUS CONTINUOUS
Status: DISCONTINUED | OUTPATIENT
Start: 2018-01-01 | End: 2018-01-01

## 2018-01-01 RX ORDER — LOSARTAN POTASSIUM 25 MG/1
25 TABLET ORAL
Status: DISCONTINUED | OUTPATIENT
Start: 2018-01-01 | End: 2018-01-01 | Stop reason: HOSPADM

## 2018-01-01 RX ORDER — TAMSULOSIN HYDROCHLORIDE 0.4 MG/1
0.4 CAPSULE ORAL DAILY
Status: DISCONTINUED | OUTPATIENT
Start: 2018-01-01 | End: 2018-01-01

## 2018-01-01 RX ORDER — LOSARTAN POTASSIUM 100 MG/1
100 TABLET ORAL DAILY
Status: DISCONTINUED | OUTPATIENT
Start: 2018-01-01 | End: 2018-01-01

## 2018-01-01 RX ORDER — POTASSIUM CHLORIDE 750 MG/1
20 CAPSULE, EXTENDED RELEASE ORAL ONCE
Status: DISCONTINUED | OUTPATIENT
Start: 2018-01-01 | End: 2018-01-01 | Stop reason: HOSPADM

## 2018-01-01 RX ORDER — ALBUTEROL SULFATE 90 UG/1
2 AEROSOL, METERED RESPIRATORY (INHALATION) EVERY 4 HOURS PRN
Qty: 1 INHALER | Refills: 2 | Status: SHIPPED | OUTPATIENT
Start: 2018-01-01

## 2018-01-01 RX ORDER — CARVEDILOL 6.25 MG/1
6.25 TABLET ORAL EVERY 12 HOURS
Status: DISCONTINUED | OUTPATIENT
Start: 2018-01-01 | End: 2018-01-01 | Stop reason: HOSPADM

## 2018-01-01 RX ORDER — ONDANSETRON 2 MG/ML
INJECTION INTRAMUSCULAR; INTRAVENOUS AS NEEDED
Status: DISCONTINUED | OUTPATIENT
Start: 2018-01-01 | End: 2018-01-01 | Stop reason: SURG

## 2018-01-01 RX ORDER — MAGNESIUM SULFATE 1 G/100ML
1 INJECTION INTRAVENOUS EVERY 8 HOURS
Status: DISCONTINUED | OUTPATIENT
Start: 2018-01-01 | End: 2018-01-01

## 2018-01-01 RX ORDER — ALPRAZOLAM 0.25 MG/1
0.25 TABLET ORAL EVERY 8 HOURS PRN
Status: DISCONTINUED | OUTPATIENT
Start: 2018-01-01 | End: 2018-01-01 | Stop reason: HOSPADM

## 2018-01-01 RX ORDER — BUDESONIDE AND FORMOTEROL FUMARATE DIHYDRATE 160; 4.5 UG/1; UG/1
2 AEROSOL RESPIRATORY (INHALATION)
Qty: 1 INHALER | Refills: 2 | Status: SHIPPED | OUTPATIENT
Start: 2018-01-01

## 2018-01-01 RX ORDER — SODIUM CHLORIDE 9 MG/ML
30 INJECTION, SOLUTION INTRAVENOUS CONTINUOUS
Status: DISCONTINUED | OUTPATIENT
Start: 2018-01-01 | End: 2018-01-01

## 2018-01-01 RX ORDER — POTASSIUM CHLORIDE 750 MG/1
20 CAPSULE, EXTENDED RELEASE ORAL DAILY
Status: DISCONTINUED | OUTPATIENT
Start: 2018-01-01 | End: 2018-01-01

## 2018-01-01 RX ORDER — FUROSEMIDE 80 MG
80 TABLET ORAL DAILY
Qty: 30 TABLET | Refills: 0 | Status: ON HOLD | OUTPATIENT
Start: 2018-01-01 | End: 2019-01-01 | Stop reason: SDUPTHER

## 2018-01-01 RX ORDER — BISACODYL 10 MG
10 SUPPOSITORY, RECTAL RECTAL DAILY PRN
Status: DISCONTINUED | OUTPATIENT
Start: 2018-01-01 | End: 2018-01-01 | Stop reason: HOSPADM

## 2018-01-01 RX ORDER — FUROSEMIDE 10 MG/ML
40 INJECTION INTRAMUSCULAR; INTRAVENOUS ONCE
Status: COMPLETED | OUTPATIENT
Start: 2018-01-01 | End: 2018-01-01

## 2018-01-01 RX ORDER — POTASSIUM CHLORIDE 750 MG/1
40 CAPSULE, EXTENDED RELEASE ORAL AS NEEDED
Status: DISCONTINUED | OUTPATIENT
Start: 2018-01-01 | End: 2018-01-01 | Stop reason: HOSPADM

## 2018-01-01 RX ORDER — AMIODARONE HYDROCHLORIDE 200 MG/1
200 TABLET ORAL EVERY 12 HOURS SCHEDULED
Qty: 120 TABLET | Refills: 0 | Status: SHIPPED | OUTPATIENT
Start: 2018-01-01 | End: 2019-01-01

## 2018-01-01 RX ORDER — ALBUTEROL SULFATE 90 UG/1
2 AEROSOL, METERED RESPIRATORY (INHALATION) EVERY 4 HOURS PRN
Status: DISCONTINUED | OUTPATIENT
Start: 2018-01-01 | End: 2018-01-01 | Stop reason: HOSPADM

## 2018-01-01 RX ORDER — CHLORHEXIDINE GLUCONATE 500 MG/1
1 CLOTH TOPICAL EVERY 12 HOURS PRN
Status: DISCONTINUED | OUTPATIENT
Start: 2018-01-01 | End: 2018-01-01

## 2018-01-01 RX ORDER — BUMETANIDE 0.25 MG/ML
2 INJECTION INTRAMUSCULAR; INTRAVENOUS EVERY 8 HOURS
Status: DISCONTINUED | OUTPATIENT
Start: 2018-01-01 | End: 2018-01-01

## 2018-01-01 RX ORDER — PANTOPRAZOLE SODIUM 40 MG/1
40 TABLET, DELAYED RELEASE ORAL
Status: DISCONTINUED | OUTPATIENT
Start: 2018-01-01 | End: 2018-01-01 | Stop reason: HOSPADM

## 2018-01-01 RX ORDER — MILRINONE LACTATE 0.2 MG/ML
.25-.75 INJECTION, SOLUTION INTRAVENOUS CONTINUOUS PRN
Status: DISCONTINUED | OUTPATIENT
Start: 2018-01-01 | End: 2018-01-01

## 2018-01-01 RX ORDER — CYCLOBENZAPRINE HCL 10 MG
10 TABLET ORAL EVERY 8 HOURS PRN
Status: DISCONTINUED | OUTPATIENT
Start: 2018-01-01 | End: 2018-01-01 | Stop reason: HOSPADM

## 2018-01-01 RX ORDER — ALBUTEROL SULFATE 90 UG/1
2 AEROSOL, METERED RESPIRATORY (INHALATION) EVERY 4 HOURS PRN
Qty: 1 INHALER | Refills: 2 | Status: SHIPPED | OUTPATIENT
Start: 2018-01-01 | End: 2018-01-01

## 2018-01-01 RX ORDER — LANOLIN ALCOHOL/MO/W.PET/CERES
1000 CREAM (GRAM) TOPICAL DAILY
COMMUNITY

## 2018-01-01 RX ORDER — IPRATROPIUM BROMIDE AND ALBUTEROL SULFATE 2.5; .5 MG/3ML; MG/3ML
3 SOLUTION RESPIRATORY (INHALATION)
Status: DISCONTINUED | OUTPATIENT
Start: 2018-01-01 | End: 2018-01-01 | Stop reason: HOSPADM

## 2018-01-01 RX ORDER — CEFAZOLIN SODIUM 2 G/100ML
2 INJECTION, SOLUTION INTRAVENOUS
Status: COMPLETED | OUTPATIENT
Start: 2018-01-01 | End: 2018-01-01

## 2018-01-01 RX ORDER — CARVEDILOL 3.12 MG/1
3.12 TABLET ORAL EVERY 12 HOURS
Status: DISCONTINUED | OUTPATIENT
Start: 2018-01-01 | End: 2018-01-01

## 2018-01-01 RX ORDER — NITROGLYCERIN 20 MG/100ML
5-200 INJECTION INTRAVENOUS
Status: DISCONTINUED | OUTPATIENT
Start: 2018-01-01 | End: 2018-01-01

## 2018-01-01 RX ORDER — PROTAMINE SULFATE 10 MG/ML
INJECTION, SOLUTION INTRAVENOUS AS NEEDED
Status: DISCONTINUED | OUTPATIENT
Start: 2018-01-01 | End: 2018-01-01 | Stop reason: HOSPADM

## 2018-01-01 RX ORDER — ATORVASTATIN CALCIUM 40 MG/1
40 TABLET, FILM COATED ORAL NIGHTLY
Qty: 30 TABLET | Refills: 1 | Status: SHIPPED | OUTPATIENT
Start: 2018-01-01 | End: 2019-02-19 | Stop reason: SDUPTHER

## 2018-01-01 RX ORDER — HEPARIN SODIUM 5000 [USP'U]/ML
INJECTION, SOLUTION INTRAVENOUS; SUBCUTANEOUS AS NEEDED
Status: DISCONTINUED | OUTPATIENT
Start: 2018-01-01 | End: 2018-01-01 | Stop reason: HOSPADM

## 2018-01-01 RX ORDER — DEXTROSE MONOHYDRATE 25 G/50ML
25 INJECTION, SOLUTION INTRAVENOUS
Status: DISCONTINUED | OUTPATIENT
Start: 2018-01-01 | End: 2018-01-01 | Stop reason: HOSPADM

## 2018-01-01 RX ORDER — BUMETANIDE 0.25 MG/ML
2 INJECTION INTRAMUSCULAR; INTRAVENOUS ONCE
Status: DISCONTINUED | OUTPATIENT
Start: 2018-01-01 | End: 2018-01-01

## 2018-01-01 RX ORDER — IPRATROPIUM BROMIDE AND ALBUTEROL SULFATE 2.5; .5 MG/3ML; MG/3ML
3 SOLUTION RESPIRATORY (INHALATION)
Status: DISCONTINUED | OUTPATIENT
Start: 2018-01-01 | End: 2018-01-01

## 2018-01-01 RX ORDER — ATORVASTATIN CALCIUM 20 MG/1
40 TABLET, FILM COATED ORAL NIGHTLY
Status: DISCONTINUED | OUTPATIENT
Start: 2018-01-01 | End: 2018-01-01 | Stop reason: HOSPADM

## 2018-01-01 RX ORDER — POTASSIUM CHLORIDE 750 MG/1
40 CAPSULE, EXTENDED RELEASE ORAL DAILY
Status: DISCONTINUED | OUTPATIENT
Start: 2018-01-01 | End: 2018-01-01 | Stop reason: HOSPADM

## 2018-01-01 RX ORDER — SODIUM CHLORIDE 0.9 % (FLUSH) 0.9 %
3-10 SYRINGE (ML) INJECTION AS NEEDED
Status: DISCONTINUED | OUTPATIENT
Start: 2018-01-01 | End: 2018-01-01

## 2018-01-01 RX ADMIN — ATORVASTATIN CALCIUM 10 MG: 10 TABLET, FILM COATED ORAL at 09:01

## 2018-01-01 RX ADMIN — MUPIROCIN 1 APPLICATION: 20 OINTMENT TOPICAL at 21:01

## 2018-01-01 RX ADMIN — CHLORHEXIDINE GLUCONATE 15 ML: 1.2 RINSE ORAL at 10:02

## 2018-01-01 RX ADMIN — SODIUM CHLORIDE 1 MCG/KG/HR: 900 INJECTION, SOLUTION INTRAVENOUS at 07:25

## 2018-01-01 RX ADMIN — SENNOSIDES AND DOCUSATE SODIUM 2 TABLET: 8.6; 5 TABLET ORAL at 21:12

## 2018-01-01 RX ADMIN — FUROSEMIDE 40 MG: 10 INJECTION, SOLUTION INTRAMUSCULAR; INTRAVENOUS at 20:36

## 2018-01-01 RX ADMIN — IPRATROPIUM BROMIDE AND ALBUTEROL SULFATE 3 ML: 2.5; .5 SOLUTION RESPIRATORY (INHALATION) at 20:19

## 2018-01-01 RX ADMIN — IPRATROPIUM BROMIDE AND ALBUTEROL SULFATE 3 ML: 2.5; .5 SOLUTION RESPIRATORY (INHALATION) at 06:41

## 2018-01-01 RX ADMIN — MUPIROCIN 1 APPLICATION: 20 OINTMENT TOPICAL at 09:08

## 2018-01-01 RX ADMIN — IPRATROPIUM BROMIDE AND ALBUTEROL SULFATE 3 ML: 2.5; .5 SOLUTION RESPIRATORY (INHALATION) at 10:26

## 2018-01-01 RX ADMIN — POTASSIUM CHLORIDE 40 MEQ: 750 CAPSULE, EXTENDED RELEASE ORAL at 08:38

## 2018-01-01 RX ADMIN — MUPIROCIN 1 APPLICATION: 20 OINTMENT TOPICAL at 20:56

## 2018-01-01 RX ADMIN — OXYCODONE HYDROCHLORIDE 10 MG: 5 TABLET ORAL at 18:32

## 2018-01-01 RX ADMIN — ENOXAPARIN SODIUM 40 MG: 40 INJECTION SUBCUTANEOUS at 17:55

## 2018-01-01 RX ADMIN — CHLORHEXIDINE GLUCONATE 15 ML: 1.2 RINSE ORAL at 05:52

## 2018-01-01 RX ADMIN — SUFENTANIL CITRATE 100 MCG: 50 INJECTION, SOLUTION EPIDURAL; INTRAVENOUS at 10:48

## 2018-01-01 RX ADMIN — ASPIRIN 81 MG: 81 TABLET, DELAYED RELEASE ORAL at 09:08

## 2018-01-01 RX ADMIN — MUPIROCIN 1 APPLICATION: 20 OINTMENT TOPICAL at 08:35

## 2018-01-01 RX ADMIN — ATORVASTATIN CALCIUM 40 MG: 20 TABLET, FILM COATED ORAL at 20:57

## 2018-01-01 RX ADMIN — Medication 2 MG: at 06:40

## 2018-01-01 RX ADMIN — BUMETANIDE 2 MG: 0.25 INJECTION INTRAMUSCULAR; INTRAVENOUS at 12:10

## 2018-01-01 RX ADMIN — ASPIRIN 81 MG: 81 TABLET, DELAYED RELEASE ORAL at 08:38

## 2018-01-01 RX ADMIN — BUDESONIDE AND FORMOTEROL FUMARATE DIHYDRATE 2 PUFF: 160; 4.5 AEROSOL RESPIRATORY (INHALATION) at 08:19

## 2018-01-01 RX ADMIN — POTASSIUM CHLORIDE 40 MEQ: 750 CAPSULE, EXTENDED RELEASE ORAL at 05:40

## 2018-01-01 RX ADMIN — TETROFOSMIN 1 DOSE: 1.38 INJECTION, POWDER, LYOPHILIZED, FOR SOLUTION INTRAVENOUS at 13:00

## 2018-01-01 RX ADMIN — ROCURONIUM BROMIDE 50 MG: 10 INJECTION INTRAVENOUS at 06:51

## 2018-01-01 RX ADMIN — HYDROCODONE BITARTRATE AND ACETAMINOPHEN 1 TABLET: 5; 325 TABLET ORAL at 12:40

## 2018-01-01 RX ADMIN — AMLODIPINE BESYLATE 10 MG: 10 TABLET ORAL at 09:01

## 2018-01-01 RX ADMIN — ASPIRIN 81 MG: 81 TABLET, DELAYED RELEASE ORAL at 08:49

## 2018-01-01 RX ADMIN — ANTACID/ANTIFLATULENT 1 TABLET: 380; 550; 10; 10 GRANULE, EFFERVESCENT ORAL at 15:02

## 2018-01-01 RX ADMIN — CARVEDILOL 6.25 MG: 6.25 TABLET, FILM COATED ORAL at 22:45

## 2018-01-01 RX ADMIN — SODIUM CHLORIDE 75 ML/HR: 9 INJECTION, SOLUTION INTRAVENOUS at 07:24

## 2018-01-01 RX ADMIN — IPRATROPIUM BROMIDE AND ALBUTEROL SULFATE 3 ML: 2.5; .5 SOLUTION RESPIRATORY (INHALATION) at 12:05

## 2018-01-01 RX ADMIN — IPRATROPIUM BROMIDE AND ALBUTEROL SULFATE 3 ML: 2.5; .5 SOLUTION RESPIRATORY (INHALATION) at 14:37

## 2018-01-01 RX ADMIN — MEPERIDINE HYDROCHLORIDE 12.5 MG: 25 INJECTION INTRAMUSCULAR; INTRAVENOUS; SUBCUTANEOUS at 14:45

## 2018-01-01 RX ADMIN — POTASSIUM CHLORIDE 20 MEQ: 750 CAPSULE, EXTENDED RELEASE ORAL at 09:08

## 2018-01-01 RX ADMIN — PROPOFOL 50 MCG/KG/MIN: 10 INJECTION, EMULSION INTRAVENOUS at 08:54

## 2018-01-01 RX ADMIN — SUGAMMADEX 200 MG: 100 INJECTION, SOLUTION INTRAVENOUS at 11:50

## 2018-01-01 RX ADMIN — METOCLOPRAMIDE 10 MG: 5 INJECTION, SOLUTION INTRAMUSCULAR; INTRAVENOUS at 20:30

## 2018-01-01 RX ADMIN — AMINOCAPROIC ACID 10 G: 250 INJECTION, SOLUTION INTRAVENOUS at 07:50

## 2018-01-01 RX ADMIN — ENOXAPARIN SODIUM 40 MG: 40 INJECTION SUBCUTANEOUS at 18:34

## 2018-01-01 RX ADMIN — DEXMEDETOMIDINE HYDROCHLORIDE 0.4 MCG/KG/HR: 100 INJECTION, SOLUTION, CONCENTRATE INTRAVENOUS at 00:30

## 2018-01-01 RX ADMIN — BUDESONIDE AND FORMOTEROL FUMARATE DIHYDRATE 2 PUFF: 80; 4.5 AEROSOL RESPIRATORY (INHALATION) at 19:49

## 2018-01-01 RX ADMIN — CARVEDILOL 3.12 MG: 3.12 TABLET, FILM COATED ORAL at 10:38

## 2018-01-01 RX ADMIN — CARVEDILOL 3.12 MG: 3.12 TABLET, FILM COATED ORAL at 23:18

## 2018-01-01 RX ADMIN — BUDESONIDE AND FORMOTEROL FUMARATE DIHYDRATE 2 PUFF: 160; 4.5 AEROSOL RESPIRATORY (INHALATION) at 20:19

## 2018-01-01 RX ADMIN — CARVEDILOL 3.12 MG: 3.12 TABLET, FILM COATED ORAL at 12:08

## 2018-01-01 RX ADMIN — IPRATROPIUM BROMIDE AND ALBUTEROL SULFATE 3 ML: 2.5; .5 SOLUTION RESPIRATORY (INHALATION) at 07:17

## 2018-01-01 RX ADMIN — ATROPINE SULFATE 0.2 MG: 0.4 INJECTION, SOLUTION INTRAMUSCULAR; INTRAVENOUS; SUBCUTANEOUS at 14:15

## 2018-01-01 RX ADMIN — ENOXAPARIN SODIUM 40 MG: 40 INJECTION SUBCUTANEOUS at 17:35

## 2018-01-01 RX ADMIN — AMIODARONE HYDROCHLORIDE 200 MG: 200 TABLET ORAL at 21:00

## 2018-01-01 RX ADMIN — IPRATROPIUM BROMIDE AND ALBUTEROL SULFATE 3 ML: 2.5; .5 SOLUTION RESPIRATORY (INHALATION) at 20:06

## 2018-01-01 RX ADMIN — AMIODARONE HYDROCHLORIDE 400 MG: 200 TABLET ORAL at 21:32

## 2018-01-01 RX ADMIN — ENOXAPARIN SODIUM 40 MG: 40 INJECTION SUBCUTANEOUS at 17:27

## 2018-01-01 RX ADMIN — PANTOPRAZOLE SODIUM 40 MG: 40 TABLET, DELAYED RELEASE ORAL at 05:21

## 2018-01-01 RX ADMIN — BUDESONIDE AND FORMOTEROL FUMARATE DIHYDRATE 2 PUFF: 160; 4.5 AEROSOL RESPIRATORY (INHALATION) at 06:29

## 2018-01-01 RX ADMIN — AMIODARONE HYDROCHLORIDE 400 MG: 200 TABLET ORAL at 20:03

## 2018-01-01 RX ADMIN — SODIUM CHLORIDE: 9 INJECTION, SOLUTION INTRAVENOUS at 06:49

## 2018-01-01 RX ADMIN — PROPOFOL 50 MG: 10 INJECTION, EMULSION INTRAVENOUS at 10:50

## 2018-01-01 RX ADMIN — PHENYLEPHRINE HYDROCHLORIDE 0.2 MCG/KG/MIN: 10 INJECTION INTRAVENOUS at 13:35

## 2018-01-01 RX ADMIN — PROPOFOL 30 MCG/KG/MIN: 10 INJECTION, EMULSION INTRAVENOUS at 13:00

## 2018-01-01 RX ADMIN — ALBUMIN (HUMAN) 250 ML: 12.5 SOLUTION INTRAVENOUS at 21:18

## 2018-01-01 RX ADMIN — PANTOPRAZOLE SODIUM 40 MG: 40 TABLET, DELAYED RELEASE ORAL at 06:11

## 2018-01-01 RX ADMIN — DEXMEDETOMIDINE HYDROCHLORIDE 0.4 MCG/KG/HR: 100 INJECTION, SOLUTION, CONCENTRATE INTRAVENOUS at 13:29

## 2018-01-01 RX ADMIN — MUPIROCIN 1 APPLICATION: 20 OINTMENT TOPICAL at 21:33

## 2018-01-01 RX ADMIN — BUDESONIDE AND FORMOTEROL FUMARATE DIHYDRATE 2 PUFF: 160; 4.5 AEROSOL RESPIRATORY (INHALATION) at 20:14

## 2018-01-01 RX ADMIN — MUPIROCIN 1 APPLICATION: 20 OINTMENT TOPICAL at 20:03

## 2018-01-01 RX ADMIN — SENNOSIDES AND DOCUSATE SODIUM 2 TABLET: 8.6; 5 TABLET ORAL at 22:45

## 2018-01-01 RX ADMIN — TETROFOSMIN 1 DOSE: 1.38 INJECTION, POWDER, LYOPHILIZED, FOR SOLUTION INTRAVENOUS at 12:05

## 2018-01-01 RX ADMIN — MILRINONE LACTATE 0.25 MCG/KG/MIN: 200 INJECTION, SOLUTION INTRAVENOUS at 10:02

## 2018-01-01 RX ADMIN — CEFAZOLIN SODIUM 2 G: 2 INJECTION, SOLUTION INTRAVENOUS at 20:30

## 2018-01-01 RX ADMIN — METOPROLOL TARTRATE 12.5 MG: 25 TABLET ORAL at 05:53

## 2018-01-01 RX ADMIN — HYDROCODONE BITARTRATE AND ACETAMINOPHEN 2 TABLET: 5; 325 TABLET ORAL at 12:09

## 2018-01-01 RX ADMIN — FUROSEMIDE 80 MG: 80 TABLET ORAL at 08:38

## 2018-01-01 RX ADMIN — AMIODARONE HYDROCHLORIDE 400 MG: 200 TABLET ORAL at 09:08

## 2018-01-01 RX ADMIN — HYDROCODONE BITARTRATE AND ACETAMINOPHEN 2 TABLET: 5; 325 TABLET ORAL at 10:11

## 2018-01-01 RX ADMIN — CHLORHEXIDINE GLUCONATE 15 ML: 1.2 RINSE ORAL at 20:56

## 2018-01-01 RX ADMIN — BUDESONIDE AND FORMOTEROL FUMARATE DIHYDRATE 2 PUFF: 80; 4.5 AEROSOL RESPIRATORY (INHALATION) at 08:52

## 2018-01-01 RX ADMIN — IPRATROPIUM BROMIDE AND ALBUTEROL SULFATE 3 ML: 2.5; .5 SOLUTION RESPIRATORY (INHALATION) at 20:04

## 2018-01-01 RX ADMIN — ATORVASTATIN CALCIUM 40 MG: 20 TABLET, FILM COATED ORAL at 21:11

## 2018-01-01 RX ADMIN — PHENYLEPHRINE HYDROCHLORIDE 0.5 MCG/KG/MIN: 10 INJECTION, SOLUTION INTRAMUSCULAR; INTRAVENOUS; SUBCUTANEOUS at 07:04

## 2018-01-01 RX ADMIN — MILRINONE LACTATE 0.25 MCG/KG/MIN: 200 INJECTION, SOLUTION INTRAVENOUS at 22:55

## 2018-01-01 RX ADMIN — ALBUTEROL SULFATE 2.5 MG: 2.5 SOLUTION RESPIRATORY (INHALATION) at 16:47

## 2018-01-01 RX ADMIN — AMIODARONE HYDROCHLORIDE 200 MG: 200 TABLET ORAL at 08:38

## 2018-01-01 RX ADMIN — CEFAZOLIN SODIUM 2 G: 2 INJECTION, SOLUTION INTRAVENOUS at 03:24

## 2018-01-01 RX ADMIN — HYDROCODONE BITARTRATE AND ACETAMINOPHEN 1 TABLET: 5; 325 TABLET ORAL at 20:56

## 2018-01-01 RX ADMIN — PROPOFOL 30 MCG/KG/MIN: 10 INJECTION, EMULSION INTRAVENOUS at 19:58

## 2018-01-01 RX ADMIN — BUMETANIDE 2 MG: 0.25 INJECTION INTRAMUSCULAR; INTRAVENOUS at 11:57

## 2018-01-01 RX ADMIN — HYDROCODONE BITARTRATE AND ACETAMINOPHEN 2 TABLET: 5; 325 TABLET ORAL at 16:52

## 2018-01-01 RX ADMIN — ALBUMIN (HUMAN) 500 ML: 12.5 SOLUTION INTRAVENOUS at 17:19

## 2018-01-01 RX ADMIN — FUROSEMIDE 40 MG: 10 INJECTION, SOLUTION INTRAMUSCULAR; INTRAVENOUS at 10:38

## 2018-01-01 RX ADMIN — AMIODARONE HYDROCHLORIDE 400 MG: 200 TABLET ORAL at 21:11

## 2018-01-01 RX ADMIN — LOSARTAN POTASSIUM 100 MG: 100 TABLET, FILM COATED ORAL at 09:01

## 2018-01-01 RX ADMIN — IPRATROPIUM BROMIDE AND ALBUTEROL SULFATE 3 ML: 2.5; .5 SOLUTION RESPIRATORY (INHALATION) at 17:37

## 2018-01-01 RX ADMIN — BUDESONIDE AND FORMOTEROL FUMARATE DIHYDRATE 2 PUFF: 160; 4.5 AEROSOL RESPIRATORY (INHALATION) at 06:41

## 2018-01-01 RX ADMIN — CHLORHEXIDINE GLUCONATE 15 ML: 1.2 RINSE ORAL at 09:09

## 2018-01-01 RX ADMIN — MUPIROCIN 1 APPLICATION: 20 OINTMENT TOPICAL at 08:50

## 2018-01-01 RX ADMIN — BUDESONIDE AND FORMOTEROL FUMARATE DIHYDRATE 2 PUFF: 160; 4.5 AEROSOL RESPIRATORY (INHALATION) at 07:52

## 2018-01-01 RX ADMIN — CHLORHEXIDINE GLUCONATE 15 ML: 1.2 RINSE ORAL at 08:35

## 2018-01-01 RX ADMIN — PANTOPRAZOLE SODIUM 40 MG: 40 TABLET, DELAYED RELEASE ORAL at 05:40

## 2018-01-01 RX ADMIN — ROCURONIUM BROMIDE 20 MG: 10 INJECTION INTRAVENOUS at 10:11

## 2018-01-01 RX ADMIN — METOPROLOL SUCCINATE 25 MG: 25 TABLET, FILM COATED, EXTENDED RELEASE ORAL at 10:15

## 2018-01-01 RX ADMIN — ASPIRIN 81 MG: 81 TABLET, DELAYED RELEASE ORAL at 15:37

## 2018-01-01 RX ADMIN — ONDANSETRON 4 MG: 2 INJECTION INTRAMUSCULAR; INTRAVENOUS at 11:41

## 2018-01-01 RX ADMIN — METOCLOPRAMIDE 10 MG: 5 INJECTION, SOLUTION INTRAMUSCULAR; INTRAVENOUS at 00:44

## 2018-01-01 RX ADMIN — LOSARTAN POTASSIUM 25 MG: 25 TABLET, FILM COATED ORAL at 08:38

## 2018-01-01 RX ADMIN — METOPROLOL SUCCINATE 25 MG: 25 TABLET, FILM COATED, EXTENDED RELEASE ORAL at 12:50

## 2018-01-01 RX ADMIN — INSULIN LISPRO 2 UNITS: 100 INJECTION, SOLUTION INTRAVENOUS; SUBCUTANEOUS at 12:49

## 2018-01-01 RX ADMIN — IPRATROPIUM BROMIDE AND ALBUTEROL SULFATE 3 ML: 2.5; .5 SOLUTION RESPIRATORY (INHALATION) at 15:59

## 2018-01-01 RX ADMIN — ASPIRIN 81 MG: 81 TABLET, DELAYED RELEASE ORAL at 10:15

## 2018-01-01 RX ADMIN — NICOTINE 1 PATCH: 14 PATCH, EXTENDED RELEASE TRANSDERMAL at 13:00

## 2018-01-01 RX ADMIN — LOSARTAN POTASSIUM 100 MG: 100 TABLET, FILM COATED ORAL at 15:37

## 2018-01-01 RX ADMIN — POTASSIUM CHLORIDE 20 MEQ: 750 CAPSULE, EXTENDED RELEASE ORAL at 09:09

## 2018-01-01 RX ADMIN — ASPIRIN 81 MG: 81 TABLET, DELAYED RELEASE ORAL at 09:01

## 2018-01-01 RX ADMIN — AMIODARONE HYDROCHLORIDE 400 MG: 200 TABLET ORAL at 14:46

## 2018-01-01 RX ADMIN — NICOTINE 1 PATCH: 14 PATCH, EXTENDED RELEASE TRANSDERMAL at 10:14

## 2018-01-01 RX ADMIN — LOSARTAN POTASSIUM 100 MG: 100 TABLET, FILM COATED ORAL at 10:15

## 2018-01-01 RX ADMIN — LOSARTAN POTASSIUM 25 MG: 25 TABLET, FILM COATED ORAL at 13:09

## 2018-01-01 RX ADMIN — PANTOPRAZOLE SODIUM 40 MG: 40 TABLET, DELAYED RELEASE ORAL at 06:57

## 2018-01-01 RX ADMIN — SENNOSIDES AND DOCUSATE SODIUM 2 TABLET: 8.6; 5 TABLET ORAL at 20:02

## 2018-01-01 RX ADMIN — PROPOFOL 30 MCG/KG/MIN: 10 INJECTION, EMULSION INTRAVENOUS at 01:08

## 2018-01-01 RX ADMIN — CARVEDILOL 3.12 MG: 3.12 TABLET, FILM COATED ORAL at 22:38

## 2018-01-01 RX ADMIN — SENNOSIDES AND DOCUSATE SODIUM 2 TABLET: 8.6; 5 TABLET ORAL at 20:57

## 2018-01-01 RX ADMIN — MUPIROCIN 1 APPLICATION: 20 OINTMENT TOPICAL at 10:02

## 2018-01-01 RX ADMIN — IPRATROPIUM BROMIDE AND ALBUTEROL SULFATE 3 ML: 2.5; .5 SOLUTION RESPIRATORY (INHALATION) at 19:45

## 2018-01-01 RX ADMIN — SENNOSIDES AND DOCUSATE SODIUM 2 TABLET: 8.6; 5 TABLET ORAL at 20:36

## 2018-01-01 RX ADMIN — ALBUMIN HUMAN 250 ML: 0.05 INJECTION, SOLUTION INTRAVENOUS at 21:18

## 2018-01-01 RX ADMIN — METOCLOPRAMIDE 10 MG: 5 INJECTION, SOLUTION INTRAMUSCULAR; INTRAVENOUS at 06:59

## 2018-01-01 RX ADMIN — IPRATROPIUM BROMIDE AND ALBUTEROL SULFATE 3 ML: 2.5; .5 SOLUTION RESPIRATORY (INHALATION) at 12:23

## 2018-01-01 RX ADMIN — CEFAZOLIN SODIUM 2 G: 2 INJECTION, SOLUTION INTRAVENOUS at 11:42

## 2018-01-01 RX ADMIN — ATORVASTATIN CALCIUM 40 MG: 20 TABLET, FILM COATED ORAL at 22:45

## 2018-01-01 RX ADMIN — SODIUM CHLORIDE: 9 INJECTION, SOLUTION INTRAVENOUS at 07:23

## 2018-01-01 RX ADMIN — MUPIROCIN 1 APPLICATION: 20 OINTMENT TOPICAL at 22:20

## 2018-01-01 RX ADMIN — IPRATROPIUM BROMIDE AND ALBUTEROL SULFATE 3 ML: 2.5; .5 SOLUTION RESPIRATORY (INHALATION) at 08:19

## 2018-01-01 RX ADMIN — BUDESONIDE AND FORMOTEROL FUMARATE DIHYDRATE 2 PUFF: 160; 4.5 AEROSOL RESPIRATORY (INHALATION) at 20:04

## 2018-01-01 RX ADMIN — TAMSULOSIN HYDROCHLORIDE 0.4 MG: 0.4 CAPSULE ORAL at 08:49

## 2018-01-01 RX ADMIN — CEFAZOLIN SODIUM 2 G: 2 INJECTION, SOLUTION INTRAVENOUS at 07:48

## 2018-01-01 RX ADMIN — CARVEDILOL 3.12 MG: 3.12 TABLET, FILM COATED ORAL at 08:49

## 2018-01-01 RX ADMIN — MAGNESIUM SULFATE HEPTAHYDRATE 2 G: 500 INJECTION, SOLUTION INTRAMUSCULAR; INTRAVENOUS at 10:22

## 2018-01-01 RX ADMIN — TAMSULOSIN HYDROCHLORIDE 0.4 MG: 0.4 CAPSULE ORAL at 22:20

## 2018-01-01 RX ADMIN — CEFAZOLIN SODIUM 2 G: 2 INJECTION, SOLUTION INTRAVENOUS at 20:56

## 2018-01-01 RX ADMIN — HYDROCODONE BITARTRATE AND ACETAMINOPHEN 2 TABLET: 5; 325 TABLET ORAL at 14:04

## 2018-01-01 RX ADMIN — BUDESONIDE AND FORMOTEROL FUMARATE DIHYDRATE 2 PUFF: 160; 4.5 AEROSOL RESPIRATORY (INHALATION) at 19:49

## 2018-01-01 RX ADMIN — METOCLOPRAMIDE 10 MG: 5 INJECTION, SOLUTION INTRAMUSCULAR; INTRAVENOUS at 12:58

## 2018-01-01 RX ADMIN — IPRATROPIUM BROMIDE AND ALBUTEROL SULFATE 3 ML: 2.5; .5 SOLUTION RESPIRATORY (INHALATION) at 13:42

## 2018-01-01 RX ADMIN — BUDESONIDE AND FORMOTEROL FUMARATE DIHYDRATE 2 PUFF: 160; 4.5 AEROSOL RESPIRATORY (INHALATION) at 21:27

## 2018-01-01 RX ADMIN — ALBUMIN (HUMAN) 500 ML: 12.5 SOLUTION INTRAVENOUS at 13:30

## 2018-01-01 RX ADMIN — FUROSEMIDE 80 MG: 80 TABLET ORAL at 13:09

## 2018-01-01 RX ADMIN — BUDESONIDE AND FORMOTEROL FUMARATE DIHYDRATE 2 PUFF: 80; 4.5 AEROSOL RESPIRATORY (INHALATION) at 20:51

## 2018-01-01 RX ADMIN — ASPIRIN 81 MG: 81 TABLET, DELAYED RELEASE ORAL at 10:01

## 2018-01-01 RX ADMIN — HYDROCODONE BITARTRATE AND ACETAMINOPHEN 1 TABLET: 5; 325 TABLET ORAL at 22:22

## 2018-01-01 RX ADMIN — POTASSIUM CHLORIDE 20 MEQ: 750 CAPSULE, EXTENDED RELEASE ORAL at 08:49

## 2018-01-01 RX ADMIN — POTASSIUM CHLORIDE 40 MEQ: 750 CAPSULE, EXTENDED RELEASE ORAL at 10:39

## 2018-01-01 RX ADMIN — TAMSULOSIN HYDROCHLORIDE 0.4 MG: 0.4 CAPSULE ORAL at 12:08

## 2018-01-01 RX ADMIN — ENOXAPARIN SODIUM 40 MG: 40 INJECTION SUBCUTANEOUS at 17:33

## 2018-01-01 RX ADMIN — MUPIROCIN 1 APPLICATION: 20 OINTMENT TOPICAL at 08:39

## 2018-01-01 RX ADMIN — MUPIROCIN 1 APPLICATION: 20 OINTMENT TOPICAL at 20:36

## 2018-01-01 RX ADMIN — ATORVASTATIN CALCIUM 40 MG: 20 TABLET, FILM COATED ORAL at 20:03

## 2018-01-01 RX ADMIN — IPRATROPIUM BROMIDE AND ALBUTEROL SULFATE 3 ML: 2.5; .5 SOLUTION RESPIRATORY (INHALATION) at 07:51

## 2018-01-01 RX ADMIN — SODIUM CHLORIDE 30 ML/HR: 9 INJECTION, SOLUTION INTRAVENOUS at 13:00

## 2018-01-01 RX ADMIN — AMLODIPINE BESYLATE 10 MG: 10 TABLET ORAL at 10:15

## 2018-01-01 RX ADMIN — ALBUTEROL SULFATE 6 PUFF: 90 AEROSOL, METERED RESPIRATORY (INHALATION) at 09:10

## 2018-01-01 RX ADMIN — ATORVASTATIN CALCIUM 10 MG: 10 TABLET, FILM COATED ORAL at 10:16

## 2018-01-01 RX ADMIN — ALBUMIN (HUMAN) 500 ML: 12.5 SOLUTION INTRAVENOUS at 19:34

## 2018-01-01 RX ADMIN — ALBUMIN (HUMAN) 500 ML: 12.5 SOLUTION INTRAVENOUS at 13:31

## 2018-01-01 RX ADMIN — TAMSULOSIN HYDROCHLORIDE 0.4 MG: 0.4 CAPSULE ORAL at 09:01

## 2018-01-01 RX ADMIN — ASPIRIN 81 MG: 81 TABLET, DELAYED RELEASE ORAL at 11:42

## 2018-01-01 RX ADMIN — IPRATROPIUM BROMIDE AND ALBUTEROL SULFATE 3 ML: 2.5; .5 SOLUTION RESPIRATORY (INHALATION) at 16:02

## 2018-01-01 RX ADMIN — PROPOFOL 100 MG: 10 INJECTION, EMULSION INTRAVENOUS at 06:50

## 2018-01-01 RX ADMIN — PANTOPRAZOLE SODIUM 40 MG: 40 TABLET, DELAYED RELEASE ORAL at 06:55

## 2018-01-01 RX ADMIN — TAMSULOSIN HYDROCHLORIDE 0.4 MG: 0.4 CAPSULE ORAL at 09:08

## 2018-01-01 RX ADMIN — IPRATROPIUM BROMIDE AND ALBUTEROL SULFATE 3 ML: 2.5; .5 SOLUTION RESPIRATORY (INHALATION) at 21:26

## 2018-01-01 RX ADMIN — BUDESONIDE AND FORMOTEROL FUMARATE DIHYDRATE 2 PUFF: 80; 4.5 AEROSOL RESPIRATORY (INHALATION) at 07:57

## 2018-01-01 RX ADMIN — BUDESONIDE AND FORMOTEROL FUMARATE DIHYDRATE 2 PUFF: 80; 4.5 AEROSOL RESPIRATORY (INHALATION) at 12:27

## 2018-01-01 RX ADMIN — SODIUM CHLORIDE 100 ML/HR: 9 INJECTION, SOLUTION INTRAVENOUS at 10:30

## 2018-01-01 RX ADMIN — MORPHINE SULFATE 4 MG: 10 INJECTION INTRAVENOUS at 14:30

## 2018-01-01 RX ADMIN — HYDROCODONE BITARTRATE AND ACETAMINOPHEN 2 TABLET: 5; 325 TABLET ORAL at 20:36

## 2018-01-01 RX ADMIN — MAGNESIUM SULFATE HEPTAHYDRATE 1 G: 1 INJECTION, SOLUTION INTRAVENOUS at 12:49

## 2018-01-01 RX ADMIN — IPRATROPIUM BROMIDE AND ALBUTEROL SULFATE 3 ML: 2.5; .5 SOLUTION RESPIRATORY (INHALATION) at 06:29

## 2018-01-01 RX ADMIN — SUFENTANIL CITRATE 25 MCG: 50 INJECTION, SOLUTION EPIDURAL; INTRAVENOUS at 06:50

## 2018-01-01 RX ADMIN — CEFAZOLIN SODIUM 2 G: 2 INJECTION, SOLUTION INTRAVENOUS at 11:08

## 2018-01-01 RX ADMIN — CHLORHEXIDINE GLUCONATE 15 ML: 1.2 RINSE ORAL at 20:36

## 2018-01-01 RX ADMIN — BUMETANIDE 2 MG: 2 TABLET ORAL at 08:49

## 2018-01-01 RX ADMIN — CARVEDILOL 3.12 MG: 3.12 TABLET, FILM COATED ORAL at 09:09

## 2018-01-01 RX ADMIN — CARVEDILOL 3.12 MG: 6.25 TABLET, FILM COATED ORAL at 13:09

## 2018-01-01 RX ADMIN — IPRATROPIUM BROMIDE AND ALBUTEROL SULFATE 3 ML: 2.5; .5 SOLUTION RESPIRATORY (INHALATION) at 15:14

## 2018-01-01 RX ADMIN — HEPARIN SODIUM 29000 UNITS: 1000 INJECTION, SOLUTION INTRAVENOUS; SUBCUTANEOUS at 08:44

## 2018-01-01 RX ADMIN — AMINOCAPROIC ACID 10 G: 250 INJECTION, SOLUTION INTRAVENOUS at 10:41

## 2018-01-01 RX ADMIN — BUDESONIDE AND FORMOTEROL FUMARATE DIHYDRATE 2 PUFF: 160; 4.5 AEROSOL RESPIRATORY (INHALATION) at 07:17

## 2018-01-01 RX ADMIN — MUPIROCIN 1 APPLICATION: 20 OINTMENT TOPICAL at 05:52

## 2018-01-01 RX ADMIN — PANTOPRAZOLE SODIUM 40 MG: 40 TABLET, DELAYED RELEASE ORAL at 05:50

## 2018-01-01 RX ADMIN — CEFAZOLIN SODIUM 2 G: 2 INJECTION, SOLUTION INTRAVENOUS at 03:12

## 2018-01-01 RX ADMIN — AMIODARONE HYDROCHLORIDE 400 MG: 200 TABLET ORAL at 08:49

## 2018-01-01 RX ADMIN — ATORVASTATIN CALCIUM 40 MG: 20 TABLET, FILM COATED ORAL at 21:32

## 2018-01-01 RX ADMIN — ATORVASTATIN CALCIUM 40 MG: 20 TABLET, FILM COATED ORAL at 20:36

## 2018-01-01 RX ADMIN — CHLORHEXIDINE GLUCONATE 15 ML: 1.2 RINSE ORAL at 22:20

## 2018-01-01 RX ADMIN — BARIUM SULFATE 183 ML: 960 POWDER, FOR SUSPENSION ORAL at 15:02

## 2018-01-01 RX ADMIN — TAMSULOSIN HYDROCHLORIDE 0.4 MG: 0.4 CAPSULE ORAL at 08:38

## 2018-01-01 RX ADMIN — IPRATROPIUM BROMIDE AND ALBUTEROL SULFATE 3 ML: 2.5; .5 SOLUTION RESPIRATORY (INHALATION) at 10:12

## 2018-01-01 RX ADMIN — MAGNESIUM SULFATE HEPTAHYDRATE 1 G: 1 INJECTION, SOLUTION INTRAVENOUS at 20:58

## 2018-01-01 RX ADMIN — CARVEDILOL 3.12 MG: 3.12 TABLET, FILM COATED ORAL at 22:22

## 2018-01-01 RX ADMIN — SUFENTANIL CITRATE 75 MCG: 50 INJECTION, SOLUTION EPIDURAL; INTRAVENOUS at 08:01

## 2018-01-01 RX ADMIN — BARIUM SULFATE 135 ML: 980 POWDER, FOR SUSPENSION ORAL at 15:02

## 2018-01-02 RX ORDER — AMLODIPINE BESYLATE 10 MG/1
TABLET ORAL
Qty: 90 TABLET | Refills: 0 | OUTPATIENT
Start: 2018-01-02

## 2018-01-02 RX ORDER — LOSARTAN POTASSIUM 100 MG/1
TABLET ORAL
Qty: 90 TABLET | Refills: 0 | OUTPATIENT
Start: 2018-01-02

## 2018-01-11 DIAGNOSIS — I10 BENIGN ESSENTIAL HTN: ICD-10-CM

## 2018-01-11 RX ORDER — AMLODIPINE BESYLATE 10 MG/1
TABLET ORAL
Qty: 30 TABLET | Refills: 0 | Status: SHIPPED | OUTPATIENT
Start: 2018-01-11 | End: 2018-01-31 | Stop reason: SDUPTHER

## 2018-01-11 RX ORDER — LOSARTAN POTASSIUM 100 MG/1
TABLET ORAL
Qty: 30 TABLET | Refills: 0 | Status: SHIPPED | OUTPATIENT
Start: 2018-01-11 | End: 2018-01-31 | Stop reason: SDUPTHER

## 2018-01-31 ENCOUNTER — OFFICE VISIT (OUTPATIENT)
Dept: INTERNAL MEDICINE | Facility: CLINIC | Age: 68
End: 2018-01-31

## 2018-01-31 VITALS
DIASTOLIC BLOOD PRESSURE: 80 MMHG | SYSTOLIC BLOOD PRESSURE: 136 MMHG | BODY MASS INDEX: 31.22 KG/M2 | HEART RATE: 95 BPM | TEMPERATURE: 98.3 F | HEIGHT: 70 IN | OXYGEN SATURATION: 94 % | WEIGHT: 218.1 LBS

## 2018-01-31 DIAGNOSIS — R73.03 BORDERLINE DIABETES: ICD-10-CM

## 2018-01-31 DIAGNOSIS — Z00.00 MEDICARE ANNUAL WELLNESS VISIT, INITIAL: ICD-10-CM

## 2018-01-31 DIAGNOSIS — Z87.891 PERSONAL HISTORY OF NICOTINE DEPENDENCE: ICD-10-CM

## 2018-01-31 DIAGNOSIS — N13.8 BENIGN PROSTATIC HYPERPLASIA WITH URINARY OBSTRUCTION: ICD-10-CM

## 2018-01-31 DIAGNOSIS — E78.00 ELEVATED CHOLESTEROL: ICD-10-CM

## 2018-01-31 DIAGNOSIS — I10 BENIGN ESSENTIAL HTN: Primary | ICD-10-CM

## 2018-01-31 DIAGNOSIS — Z72.0 TOBACCO ABUSE: ICD-10-CM

## 2018-01-31 DIAGNOSIS — B35.1 ONYCHOMYCOSIS: ICD-10-CM

## 2018-01-31 DIAGNOSIS — R06.02 SHORTNESS OF BREATH: ICD-10-CM

## 2018-01-31 DIAGNOSIS — N40.1 BENIGN PROSTATIC HYPERPLASIA WITH URINARY OBSTRUCTION: ICD-10-CM

## 2018-01-31 PROCEDURE — G0438 PPPS, INITIAL VISIT: HCPCS | Performed by: FAMILY MEDICINE

## 2018-01-31 PROCEDURE — 90662 IIV NO PRSV INCREASED AG IM: CPT | Performed by: FAMILY MEDICINE

## 2018-01-31 PROCEDURE — 99214 OFFICE O/P EST MOD 30 MIN: CPT | Performed by: FAMILY MEDICINE

## 2018-01-31 PROCEDURE — G0009 ADMIN PNEUMOCOCCAL VACCINE: HCPCS | Performed by: FAMILY MEDICINE

## 2018-01-31 PROCEDURE — 99407 BEHAV CHNG SMOKING > 10 MIN: CPT | Performed by: FAMILY MEDICINE

## 2018-01-31 PROCEDURE — 90732 PPSV23 VACC 2 YRS+ SUBQ/IM: CPT | Performed by: FAMILY MEDICINE

## 2018-01-31 PROCEDURE — G0008 ADMIN INFLUENZA VIRUS VAC: HCPCS | Performed by: FAMILY MEDICINE

## 2018-01-31 RX ORDER — TAMSULOSIN HYDROCHLORIDE 0.4 MG/1
1 CAPSULE ORAL DAILY
Qty: 90 CAPSULE | Refills: 3 | Status: SHIPPED | OUTPATIENT
Start: 2018-01-31

## 2018-01-31 RX ORDER — ATORVASTATIN CALCIUM 10 MG/1
10 TABLET, FILM COATED ORAL DAILY
Qty: 90 TABLET | Refills: 3 | Status: SHIPPED | OUTPATIENT
Start: 2018-01-31 | End: 2018-02-01 | Stop reason: DRUGHIGH

## 2018-01-31 RX ORDER — AMLODIPINE BESYLATE 10 MG/1
10 TABLET ORAL DAILY
Qty: 90 TABLET | Refills: 3 | Status: SHIPPED | OUTPATIENT
Start: 2018-01-31 | End: 2018-01-01 | Stop reason: HOSPADM

## 2018-01-31 RX ORDER — LOSARTAN POTASSIUM 100 MG/1
100 TABLET ORAL DAILY
Qty: 90 TABLET | Refills: 3 | Status: SHIPPED | OUTPATIENT
Start: 2018-01-31 | End: 2018-01-01 | Stop reason: HOSPADM

## 2018-01-31 NOTE — PROGRESS NOTES
Fredis Lockwood is a 67 y.o. male.      Assessment/Plan   Problem List Items Addressed This Visit        Cardiovascular and Mediastinum    Benign essential HTN - Primary    Relevant Medications    losartan (COZAAR) 100 MG tablet    amLODIPine (NORVASC) 10 MG tablet    Elevated cholesterol    Relevant Medications    atorvastatin (LIPITOR) 10 MG tablet    Other Relevant Orders    Lipid Panel       Respiratory    Shortness of breath    Relevant Orders    Spirometry With Bronchodilator       Musculoskeletal and Integument    Onychomycosis       Genitourinary    Benign prostatic hyperplasia with urinary obstruction    Relevant Medications    tamsulosin (FLOMAX) 0.4 MG capsule 24 hr capsule       Other    Borderline diabetes    Relevant Orders    Hemoglobin A1c    Tobacco abuse    Relevant Orders    Spirometry With Bronchodilator    [place] aaa screen limited    CT Chest Low Dose Wo    Medicare annual wellness visit, initial    Relevant Orders    [place] aaa screen limited      Other Visit Diagnoses     Personal history of nicotine dependence         Relevant Orders    CT Chest Low Dose Wo           Continue Present management of hypertension hyperlipidemia follow-up results of blood work and spirometry blood pressure goal less than 140/90.      Chief Complaint   Patient presents with   • follow up to  hypertension   • follow up to hyperlpidemia   • breathing feels labored   • Initial Medicare Wellness     Social History   Substance Use Topics   • Smoking status: Current Every Day Smoker     Packs/day: 0.75   • Smokeless tobacco: Never Used   • Alcohol use Yes     She comes in follow-up of chronic medical problems hypertension hyperlipidemia as well as smoking history.  He has had some shortness of breath it's developed over the last few monthsof a cough he doesn't have any pressure in his chest or dizziness or nausea sweats.  Nicotine Dependence   Presents for initial visit. Preferred tobacco types include cigarettes. Preferred  cigarette types include filtered. Preferred strength is regular. Preferred cigarettes are menthol. Cigarette brand: pall mall. His urge triggers include company of smokers. His first smoke is from 6 to 8 AM. He smokes 1 pack of cigarettes per day. He started smoking when he was >15 years old. Past treatments include nicotine gum and nicotine patch. The treatment provided significant relief. Compliance with prior treatments has been good. Fredis is thinking about quitting. Fredis has tried to quit 5 or more times. There is no history of alcohol abuse and drug use.      [facility] Low-Dose Lung Cancer CT Screening     Fredis Lockwood is a 67 y.o.  male with whom I discussed Low-Dose Lung Cancer Screening today.     SMOKING HISTORY:   History   Smoking Status   • Current Every Day Smoker   • Packs/day: 0.75   Smokeless Tobacco   • Never Used       Fredis Lockwood is currently smoking .75 pack(s) per day with a 45 pack year history.  he reports no use of alternate forms of tobacco, electronic cigarettes, marijuana or other substances.  Based on the recommendation of the [place] Preventive Services Task Force, this patient is at high risk for lung cancer and a low-dose CT screening scan is recommended.     We discussed the connection between Radon and Lung Cancer and the availability of free test kits. Mr. Lockwood has had no  second-hand smoke exposure as a child with smoking in their home.    The patient has had no hemoptysis, unintentional weight loss or increasing shortness of breath. The patient is asymptomatic and has no signs or symptoms of lung cancer.     Together we discussed the potential benefits and potential harms of being screened for lung cancer including the potential for follow up diagnostic testing, risk for over diagnosis, false positive rate and radiation exposure using the [facility] standardized decision aid. We reviewed the patient's smoking history and  counseled on the importance and health benefits of maintaining cigarette smoking abstinence.      Smoking Abstinence  DISCUSSION HELD TODAY:     We discussed that there are a number of resources and interventions to assist with smoking cessation if needed in the future including the 1-800-Quit Now line, Health Department programs, [place] [facility] resources, and use of the [place] Department of Health and Human Services five keys for quitting and quit plan worksheet.  On a scale of zero to ten, the patient rates their motivation to stay quit at a 3 out of 10 today.    Recommendations for continued lung cancer screening:      We discussed the NCCN guidelines for lung cancer screening and the patient verbalized understanding that annual screening is recommended until fifteen years beyond smoking as long as they have no other disease or comorbidity that would prevent them from receiving cancer treatments such as surgery should a lung cancer be detected. The [facility] Lung Cancer Screening Shared Decision-Making Tool was utilized as an aid in discussing whether or not screening was right. The patient has decided to proceed with a Low Dose Lung Cancer Screening CT today. The patient is aware that the results of his screening will be shared with the referring provider or PCP as well.       The patient verbalizes understanding that results of this low dose lung CT exam will be called to him and that assistance will be provided in arranging any necessary follow-up.    After review of the NCCN guidelines and recommendations for ongoing screening, the patient verbalized understanding of recommendations for follow-up. We discussed the importance of continued annual screening until 15 years beyond smoking or until other life-limiting comorbidities are present. We discussed the impact of comorbidities and ability and willing to undergo diagnosis and treatment.    15 minutes out [id number] face-to-face spent counseling  "patient on the continued health benefits of having quit tobacco, maintaining smoking abstinence, smoking cessation strategies and resources, as well as the importance of adherence to annual lung cancer low-dose CT screening.  The following portions of the patient's history were reviewed and updated as appropriate:PMHroutine: Social history , Past Medical History, Allergies, Current Medications, Active Problem List, Family History and Health Maintenance    Review of Systems   Constitutional: Negative.    HENT: Negative.    Eyes: Negative.    Respiratory: Positive for shortness of breath.    Cardiovascular: Negative.    Gastrointestinal: Negative.    Endocrine: Negative.    Genitourinary: Negative.    Musculoskeletal: Negative.    Skin: Negative.         toenails   Neurological: Negative.    Hematological: Negative.    Psychiatric/Behavioral: Negative.        Objective   Vitals:    01/31/18 0826   BP: 136/80   BP Location: Right arm   Patient Position: Sitting   Cuff Size: Large Adult   Pulse: 95   Temp: 98.3 °F (36.8 °C)   TempSrc: Oral   SpO2: 94%   Weight: 98.9 kg (218 lb 1.6 oz)   Height: 177.8 cm (70\")     Body mass index is 31.29 kg/(m^2).  Physical Exam   Constitutional: He is oriented to person, place, and time. He appears well-developed and well-nourished. No distress.   HENT:   Head: Normocephalic and atraumatic.   Right Ear: External ear normal.   Left Ear: External ear normal.   Nose: Nose normal.   Mouth/Throat: Oropharynx is clear and moist.   Eyes: Conjunctivae and EOM are normal. Pupils are equal, round, and reactive to light. Right eye exhibits no discharge. Left eye exhibits no discharge. No scleral icterus.   Neck: Normal range of motion. Neck supple. No tracheal deviation present. No thyromegaly present.   Cardiovascular: Normal rate, regular rhythm, normal heart sounds, intact distal pulses and normal pulses.  Exam reveals no gallop.    No murmur heard.  Pulmonary/Chest: Effort normal and breath " sounds normal. No respiratory distress. He has no wheezes. He has no rales.   Abdominal: Soft. Bowel sounds are normal. He exhibits no distension and no mass. There is no tenderness.   Musculoskeletal: Normal range of motion.   Neurological: He is alert and oriented to person, place, and time. He exhibits normal muscle tone. Coordination normal.   Skin: Skin is warm. No rash noted. No erythema. No pallor.   Psychiatric: He has a normal mood and affect. His behavior is normal. Judgment and thought content normal.   Nursing note and vitals reviewed.    Reviewed Data:  No visits with results within 1 Month(s) from this visit.  Latest known visit with results is:    Office Visit on 11/30/2016   Component Date Value Ref Range Status   • Total Cholesterol 11/30/2016 179  0 - 200 mg/dL Final   • Triglycerides 11/30/2016 88  0 - 150 mg/dL Final   • HDL Cholesterol 11/30/2016 42  40 - 60 mg/dL Final   • VLDL Cholesterol 11/30/2016 17.6  5 - 40 mg/dL Final   • LDL Cholesterol  11/30/2016 119* 0 - 100 mg/dL Final   • Glucose 11/30/2016 93  65 - 99 mg/dL Final   • BUN 11/30/2016 12  8 - 23 mg/dL Final   • Creatinine 11/30/2016 0.92  0.76 - 1.27 mg/dL Final   • eGFR Non African Am 11/30/2016 82  >60 mL/min/1.73 Final   • eGFR African Am 11/30/2016 99  >60 mL/min/1.73 Final   • BUN/Creatinine Ratio 11/30/2016 13.0  7.0 - 25.0 Final   • Sodium 11/30/2016 144  136 - 145 mmol/L Final   • Potassium 11/30/2016 4.4  3.5 - 5.2 mmol/L Final   • Chloride 11/30/2016 100  98 - 107 mmol/L Final   • Total CO2 11/30/2016 33.4* 22.0 - 29.0 mmol/L Final   • Calcium 11/30/2016 10.3  8.6 - 10.5 mg/dL Final   • Hemoglobin A1C 11/30/2016 5.80* 4.80 - 5.60 % Final    Comment: Hemoglobin A1C Ranges:  Increased Risk for Diabetes  5.7% to 6.4%  Diabetes                     >= 6.5%  Diabetic Goal                < 7.0%     • Hep C Virus Ab 11/30/2016 0.1  0.0 - 0.9 s/co ratio Final    Comment:                                   Negative:     < 0.8                                Indeterminate: 0.8 - 0.9                                    Positive:     > 0.9   The CDC recommends that a positive HCV antibody result   be followed up with a HCV Nucleic Acid Amplification   test ([id number]).

## 2018-01-31 NOTE — PROGRESS NOTES
QUICK REFERENCE INFORMATION:  The ABCs of the Annual Wellness Visit    Initial Medicare Wellness Visit    HEALTH RISK ASSESSMENT    1950    Recent Hospitalizations:  No hospitalization(s) within the last year..        Current Medical Providers:  Patient Care Team:  Jus Durham MD as PCP - General (Family Medicine)  Josse Duvall MD as PCP - Claims Attributed        Smoking Status:  History   Smoking Status   • Current Every Day Smoker   • Packs/day: 0.75   Smokeless Tobacco   • Never Used       Alcohol Consumption:  History   Alcohol Use   • Yes       Depression Screen:   PHQ-2/PHQ-9 Depression Screening 1/31/2018   Little interest or pleasure in doing things 1   Feeling down, depressed, or hopeless 0   Total Score 1       Health Habits and Functional and Cognitive Screening:  Functional & Cognitive Status 1/31/2018   Do you have difficulty preparing food and eating? No   Do you have difficulty bathing yourself, getting dressed or grooming yourself? No   Do you have difficulty using the toilet? No   Do you have difficulty moving around from place to place? No   Do you have trouble with steps or getting out of a bed or a chair? No   In the past year have you fallen or experienced a near fall? No   Current Diet Limited Junk Food   Dental Exam Not up to date   Eye Exam Not up to date   Exercise (times per week) 2 times per week   Current Exercise Activities Include Walking   Do you need help using the phone?  No   Are you deaf or do you have serious difficulty hearing?  No   Do you need help with transportation? No   Do you need help shopping? No   Do you need help preparing meals?  No   Do you need help with housework?  No   Do you need help with laundry? No   Do you need help taking your medications? No   Do you need help managing money? No   Have you felt unusual stress, anger or loneliness in the last month? Yes   Who do you live with? Alone   If you need help, do you have trouble finding someone  available to you? No   Have you been bothered in the last four weeks by sexual problems? No   Do you have difficulty concentrating, remembering or making decisions? Yes           Does the patient have evidence of cognitive impairment? No    Asiprin use counseling: Start ASA 81 mg daily       Recent Lab Results:    Visual Acuity:  No exam data present    Age-appropriate Screening Schedule:  Refer to the list below for future screening recommendations based on patient's age, sex and/or medical conditions. Orders for these recommended tests are listed in the plan section. The patient has been provided with a written plan.    Health Maintenance   Topic Date Due   • TDAP/TD VACCINES (1 - Tdap) 08/13/1967   • LIPID PANEL  11/30/2017   • INFLUENZA VACCINE  Completed   • PNEUMOCOCCAL VACCINES (65+ LOW/MEDIUM RISK)  Completed   • ZOSTER VACCINE  Completed   • COLONOSCOPY  Excluded        Subjective   History of Present Illness    Fredis Lockwood is a 67 y.o. male who presents for an Annual Wellness Visit.    The following portions of the patient's history were reviewed and updated as appropriate: allergies, current medications, past family history, past medical history, past social history, past surgical history and problem list.    Outpatient Medications Prior to Visit   Medication Sig Dispense Refill   • aspirin 81 MG EC tablet Take 81 mg by mouth daily.     • Cholecalciferol (VITAMIN D3) 400 UNITS capsule Take 1 tablet by mouth daily.     • Multiple Vitamin (MULTIVITAMINS PO) Take 1 tablet by mouth daily.     • sildenafil (VIAGRA) 100 MG tablet Take 1/2  tablet daily one hour before needed erection. 6 tablet 6   • amLODIPine (NORVASC) 10 MG tablet TAKE 1 TABLET BY MOUTH DAILY **MUST BE SEEN BEFORE NEXT REFILL** 30 tablet 0   • losartan (COZAAR) 100 MG tablet TAKE 1 TABLET BY MOUTH DAILY **MUST BE SEEN BEFORE NEXT REFILL** 30 tablet 0   • tamsulosin (FLOMAX) 0.4 MG capsule 24 hr capsule TAKE 1 CAPSULE BY MOUTH EVERY  "NIGHT. 30 capsule 2     No facility-administered medications prior to visit.        Patient Active Problem List   Diagnosis   • Benign essential HTN   • Benign prostatic hyperplasia with urinary obstruction   • ED (erectile dysfunction) of non-organic origin   • Elevated cholesterol   • Borderline diabetes   • Bundle branch block, right   • Avitaminosis D   • Tobacco abuse   • Nocturia more than twice per night   • Health care maintenance   • Medicare annual wellness visit, initial   • Shortness of breath   • Onychomycosis       Advance Care Planning:  has NO advance directive - information provided to the patient today    Identification of Risk Factors:  Risk factors include: cardiovascular risk and tobacco use.    Review of Systems    Compared to one year ago, the patient feels his physical health is the same.  Compared to one year ago, the patient feels his mental health is worse. Recent death of loved one handling grief    Objective     Physical Exam    Vitals:    01/31/18 0826   BP: 136/80   BP Location: Right arm   Patient Position: Sitting   Cuff Size: Large Adult   Pulse: 95   Temp: 98.3 °F (36.8 °C)   TempSrc: Oral   SpO2: 94%   Weight: 98.9 kg (218 lb 1.6 oz)   Height: 177.8 cm (70\")   PainSc: 0-No pain       Body mass index is 31.29 kg/(m^2).  Discussed the patient's BMI with him. BMI is above normal parameters. Follow-up plan includes:  educational material and nutrition counseling.    Assessment/Plan   Patient Self-Management and Personalized Health Advice  The patient has been provided with information about: diet, exercise, weight management, prevention of cardiac or vascular disease, tobacco cessation and designing advance directives and preventive services including:   · Advance directive, Diabetes screening, see lab orders, Influenza vaccine, Pneumococcal vaccine , Screening for AAA, referral for ultrasound placed, Smoking cessation counseling completed.    Visit Diagnoses:    ICD-10-CM ICD-9-CM "   1. Benign essential HTN I10 401.1   2. Elevated cholesterol E78.00 272.0   3. Shortness of breath R06.02 786.05   4. Medicare annual wellness visit, initial Z00.00 V70.0   5. Tobacco abuse Z72.0 305.1   6. Borderline diabetes R73.03 790.29   7. Onychomycosis B35.1 110.1   8. Benign prostatic hyperplasia with urinary obstruction N40.1 600.01    N13.8 599.69   9. Personal history of nicotine dependence  Z87.891 V15.82       Orders Placed This Encounter   Procedures   • US aaa screen limited     Standing Status:   Future     Standing Expiration Date:   2/1/2019     Order Specific Question:   Reason for Exam:     Answer:   smoker   • CT Chest Low Dose Wo     Standing Status:   Future     Standing Expiration Date:   1/31/2019   • Flu Vaccine High Dose PF 65YR+   • Pneumococcal Polysaccharide Vaccine 23-Valent Greater Than or Equal To 3yo Subcutaneous / IM   • Lipid Panel   • Hemoglobin A1c   • Spirometry With Bronchodilator     Order Specific Question:   What type of PFT procedures would you like performed?     Answer:   Spirometry pre and post bronchodilator       Outpatient Encounter Prescriptions as of 1/31/2018   Medication Sig Dispense Refill   • amLODIPine (NORVASC) 10 MG tablet Take 1 tablet by mouth Daily. 90 tablet 3   • aspirin 81 MG EC tablet Take 81 mg by mouth daily.     • Cholecalciferol (VITAMIN D3) 400 UNITS capsule Take 1 tablet by mouth daily.     • losartan (COZAAR) 100 MG tablet Take 1 tablet by mouth Daily. 90 tablet 3   • Multiple Vitamin (MULTIVITAMINS PO) Take 1 tablet by mouth daily.     • sildenafil (VIAGRA) 100 MG tablet Take 1/2  tablet daily one hour before needed erection. 6 tablet 6   • [DISCONTINUED] amLODIPine (NORVASC) 10 MG tablet TAKE 1 TABLET BY MOUTH DAILY **MUST BE SEEN BEFORE NEXT REFILL** 30 tablet 0   • [DISCONTINUED] losartan (COZAAR) 100 MG tablet TAKE 1 TABLET BY MOUTH DAILY **MUST BE SEEN BEFORE NEXT REFILL** 30 tablet 0   • atorvastatin (LIPITOR) 10 MG tablet Take 1 tablet  by mouth Daily. 90 tablet 3   • tamsulosin (FLOMAX) 0.4 MG capsule 24 hr capsule Take 1 capsule by mouth Daily. 90 capsule 3   • [DISCONTINUED] tamsulosin (FLOMAX) 0.4 MG capsule 24 hr capsule TAKE 1 CAPSULE BY MOUTH EVERY NIGHT. 30 capsule 2     No facility-administered encounter medications on file as of 1/31/2018.        Reviewed use of high risk medication in the elderly: yes  Reviewed for potential of harmful drug interactions in the elderly: yes    Follow Up:  Follow-up results of the CT abdominal ultrasound and spirometry  Recommend Tdap given at pharmacy    An After Visit Summary and PPPS with all of these plans were given to the patient.

## 2018-02-01 ENCOUNTER — TELEPHONE (OUTPATIENT)
Dept: INTERNAL MEDICINE | Facility: CLINIC | Age: 68
End: 2018-02-01

## 2018-02-01 LAB
CHOLEST SERPL-MCNC: 192 MG/DL (ref 100–199)
HBA1C MFR BLD: 5.7 % (ref 4.8–5.6)
HDLC SERPL-MCNC: 42 MG/DL
LDLC SERPL CALC-MCNC: 126 MG/DL (ref 0–99)
TRIGL SERPL-MCNC: 121 MG/DL (ref 0–149)
VLDLC SERPL CALC-MCNC: 24 MG/DL (ref 5–40)

## 2018-02-01 RX ORDER — ATORVASTATIN CALCIUM 20 MG/1
20 TABLET, FILM COATED ORAL DAILY
Qty: 90 TABLET | Refills: 1 | Status: SHIPPED | OUTPATIENT
Start: 2018-02-01 | End: 2018-01-01 | Stop reason: DRUGHIGH

## 2018-02-01 NOTE — TELEPHONE ENCOUNTER
----- Message from Jus Durham MD sent at 2/1/2018  8:11 AM EST -----  Recommend increasing his Lipitor to 20 mg daily #30 RF 6 recheck fasting lipid profile in 3 months long-term sugar test good

## 2018-02-05 DIAGNOSIS — Z87.891 PERSONAL HISTORY OF SMOKING: Primary | ICD-10-CM

## 2018-02-05 DIAGNOSIS — Z13.6 SCREENING FOR AAA (ABDOMINAL AORTIC ANEURYSM): ICD-10-CM

## 2018-02-07 ENCOUNTER — TELEPHONE (OUTPATIENT)
Dept: INTERNAL MEDICINE | Facility: CLINIC | Age: 68
End: 2018-02-07

## 2018-02-07 ENCOUNTER — HOSPITAL ENCOUNTER (OUTPATIENT)
Dept: RESPIRATORY THERAPY | Facility: HOSPITAL | Age: 68
Discharge: HOME | End: 2018-02-07

## 2018-02-07 ENCOUNTER — HOSPITAL ENCOUNTER (OUTPATIENT)
Dept: ULTRASOUND IMAGING | Facility: HOSPITAL | Age: 68
Discharge: HOME | End: 2018-02-07
Admitting: FAMILY MEDICINE

## 2018-02-07 DIAGNOSIS — I71.40 ABDOMINAL AORTIC ANEURYSM (AAA) WITHOUT RUPTURE (HCC): Primary | ICD-10-CM

## 2018-02-07 DIAGNOSIS — Z72.0 TOBACCO ABUSE: ICD-10-CM

## 2018-02-07 DIAGNOSIS — Z00.00 MEDICARE ANNUAL WELLNESS VISIT, INITIAL: ICD-10-CM

## 2018-02-07 PROCEDURE — A9270 NON-COVERED ITEM OR SERVICE: HCPCS | Performed by: FAMILY MEDICINE

## 2018-02-07 PROCEDURE — 76706 US ABDL AORTA SCREEN AAA: CPT

## 2018-02-07 PROCEDURE — 94060 EVALUATION OF WHEEZING: CPT

## 2018-02-07 PROCEDURE — 63710000001 ALBUTEROL PER 1 MG: Performed by: FAMILY MEDICINE

## 2018-02-07 RX ORDER — ALBUTEROL SULFATE 2.5 MG/3ML
2.5 SOLUTION RESPIRATORY (INHALATION) ONCE
Status: COMPLETED | OUTPATIENT
Start: 2018-02-07 | End: 2018-02-07

## 2018-02-07 RX ADMIN — ALBUTEROL SULFATE 2.5 MG: 2.5 SOLUTION RESPIRATORY (INHALATION) at 14:04

## 2018-02-07 NOTE — TELEPHONE ENCOUNTER
----- Message from Jus Durham MD sent at 2/7/2018  1:20 PM EST -----  Patient has a aortic aneurysm recommend consultation with Dr. Gui Chopra

## 2018-02-09 ENCOUNTER — TELEPHONE (OUTPATIENT)
Dept: INTERNAL MEDICINE | Facility: CLINIC | Age: 68
End: 2018-02-09

## 2018-02-09 RX ORDER — BUDESONIDE AND FORMOTEROL FUMARATE DIHYDRATE 80; 4.5 UG/1; UG/1
2 AEROSOL RESPIRATORY (INHALATION)
Qty: 1 INHALER | Refills: 3 | Status: SHIPPED | OUTPATIENT
Start: 2018-02-09 | End: 2018-01-01 | Stop reason: HOSPADM

## 2018-02-09 NOTE — TELEPHONE ENCOUNTER
----- Message from uJs Durham MD sent at 2/9/2018  3:31 PM EST -----  Pulmonary function test spirometry evidence of obstruction would recommend Symbicort HFA 80/4.5, 2 puffs every 12 hours #1 rf3 follow-up in one month

## 2018-04-27 NOTE — PROGRESS NOTES
Fredis Lockwood is a 67 y.o. male.      Assessment/Plan   Problem List Items Addressed This Visit        Cardiovascular and Mediastinum    Elevated cholesterol - Primary    Relevant Orders    Lipid Panel    ALT    AST      Other Visit Diagnoses    None.          Follow-up results of lipid management for ongoing treatment low-fat low-cholesterol diet recommended otherwise return office appointment in 6-9  months      Chief Complaint   Patient presents with   • follow up to hyperlipidemia   • follow up to shortness of breath     Social History   Substance Use Topics   • Smoking status: Current Every Day Smoker     Packs/day: 0.75   • Smokeless tobacco: Never Used   • Alcohol use Yes       History of Present Illness   Chronic medical problems of  and hyperlipidemia was breath improving is no muscle aches myalgias or change in bowel habits he is to continue weaning down on his smoking habits  The following portions of the patient's history were reviewed and updated as appropriate:PMHroutine: Social history , Past Medical History, Surgical history , Allergies, Current Medications, Active Problem List and Health Maintenance    Review of Systems   Constitutional: Negative for activity change, appetite change and unexpected weight change.   HENT: Negative for nosebleeds and trouble swallowing.    Eyes: Negative for pain and visual disturbance.   Respiratory: Negative for chest tightness, shortness of breath and wheezing.    Cardiovascular: Negative for chest pain and palpitations.   Gastrointestinal: Negative for abdominal pain and blood in stool.   Endocrine: Negative.    Genitourinary: Negative for difficulty urinating and hematuria.   Musculoskeletal: Negative for joint swelling.   Skin: Negative for color change and rash.   Allergic/Immunologic: Negative.    Neurological: Negative for syncope and speech difficulty.   Hematological: Negative for adenopathy.   Psychiatric/Behavioral: Negative for agitation and  "confusion.   All other systems reviewed and are negative.      Objective   Vitals:    04/27/18 0908   BP: 156/88   BP Location: Right arm   Patient Position: Sitting   Cuff Size: Large Adult   Pulse: 100   Temp: 98.3 °F (36.8 °C)   TempSrc: Oral   SpO2: 93%   Weight: 101 kg (222 lb 6.4 oz)   Height: 177.8 cm (70\")     Body mass index is 31.91 kg/m².  Physical Exam   Constitutional: He is oriented to person, place, and time. He appears well-developed and well-nourished. No distress.   HENT:   Head: Normocephalic and atraumatic.   Right Ear: External ear normal.   Left Ear: External ear normal.   Nose: Nose normal.   Mouth/Throat: Oropharynx is clear and moist.   Eyes: Conjunctivae and EOM are normal. Pupils are equal, round, and reactive to light. Right eye exhibits no discharge. Left eye exhibits no discharge. No scleral icterus.   Neck: Normal range of motion. Neck supple. No tracheal deviation present. No thyromegaly present.   Cardiovascular: Normal rate, regular rhythm, normal heart sounds, intact distal pulses and normal pulses.  Exam reveals no gallop.    No murmur heard.  Pulmonary/Chest: Effort normal and breath sounds normal. No respiratory distress. He has no wheezes. He has no rales.   Abdominal: Soft. Bowel sounds are normal. He exhibits no distension and no mass. There is no tenderness.   Musculoskeletal: Normal range of motion.   Neurological: He is alert and oriented to person, place, and time. He exhibits normal muscle tone. Coordination normal.   Skin: Skin is warm. No rash noted. No erythema. No pallor.   Psychiatric: He has a normal mood and affect. His behavior is normal. Judgment and thought content normal.   Nursing note and vitals reviewed.    Reviewed Data:  No visits with results within 1 Month(s) from this visit.   Latest known visit with results is:   Office Visit on 01/31/2018   Component Date Value Ref Range Status   • Total Cholesterol 02/01/2018 192  100 - 199 mg/dL Final   • " Triglycerides 02/01/2018 121  0 - 149 mg/dL Final   • HDL Cholesterol 02/01/2018 42  >39 mg/dL Final   • VLDL Cholesterol 02/01/2018 24  5 - 40 mg/dL Final   • LDL Cholesterol  02/01/2018 126* 0 - 99 mg/dL Final   • Hemoglobin A1C 02/01/2018 5.7* 4.8 - 5.6 % Final    Comment:          Pre-diabetes: 5.7 - 6.4           Diabetes: >6.4           Glycemic control for adults with diabetes: <7.0

## 2018-04-30 NOTE — TELEPHONE ENCOUNTER
----- Message from Jus Durham MD sent at 4/30/2018  4:39 PM EDT -----  Labs ok continue same medications

## 2018-08-20 NOTE — TELEPHONE ENCOUNTER
Patient called stating that he was referred to a specialist for an AAA about 3 months ago.  He was notified that it was not enlarged enough to have surgery and this will be watched.  Patient stated that for a few days he was unable to keep anything down and now when he eats everything upsets his stomach.  He has been taking Pepto Bismol for this.  Please advise.

## 2018-10-25 PROBLEM — M25.551 PAIN OF RIGHT HIP JOINT: Status: ACTIVE | Noted: 2018-01-01

## 2018-10-25 PROBLEM — Z23 NEED FOR INFLUENZA VACCINATION: Status: ACTIVE | Noted: 2018-01-01

## 2018-10-25 PROBLEM — J43.1 PANLOBULAR EMPHYSEMA (HCC): Status: ACTIVE | Noted: 2018-01-01

## 2018-10-25 PROBLEM — R94.31 ABNORMAL EKG: Status: ACTIVE | Noted: 2018-01-01

## 2018-10-25 NOTE — PROGRESS NOTES
Fredis Lockwood is a 68 y.o. male.      Assessment/Plan   Problem List Items Addressed This Visit        Cardiovascular and Mediastinum    Benign essential HTN - Primary    Relevant Orders    Comprehensive Metabolic Panel (Completed)    ECG 12 Lead    Elevated cholesterol    Relevant Medications    atorvastatin (LIPITOR) 10 MG tablet    Abnormal EKG    Relevant Orders    Ambulatory Referral to Cardiology       Respiratory    Shortness of breath    Relevant Orders    Ambulatory Referral to Cardiology    Panlobular emphysema (CMS/HCC)       Digestive    Avitaminosis D    Overview     Description: 5/13   29.7 on 400IU vit D            Nervous and Auditory    Pain of right hip joint    Relevant Orders    XR Hip With or Without Pelvis 2 - 3 View Right (Completed)       Genitourinary    Benign prostatic hyperplasia with urinary obstruction    Relevant Orders    PSA SCREENING (Completed)       Other    Tobacco abuse    Need for influenza vaccination      Other Visit Diagnoses     Encounter for screening for malignant neoplasm of prostate         Relevant Orders    PSA SCREENING (Completed)         40 minutes was spent face-to-face with patient with greater than 50% of the time discussing disease pathology in progress as well as coordinating ongoing care  Follow-up results of x-ray for right hip pain consult cardiology for abnormal EKG with exertional dyspnea recommend smoking cessation  Follow-up results of blood work screening PSA  Continue present inhaler use inhaler use    Chief Complaint   Patient presents with   • follow up to hypertension   • follow up to hyperlipidemia   • follow up to BPH   • right hip pain   COPD  Social History   Substance Use Topics   • Smoking status: Current Every Day Smoker     Packs/day: 0.75   • Smokeless tobacco: Never Used   • Alcohol use Yes       History of Present Illness   Follows up for chronic problems of hypertension hyperlipidemia BPH been fairly stable he has had some  "increasing shortness of breath with exerting however he is still long-term smoker and actively smoking difficulty quitting he felt some right hip pain over the last couple months seems to radiate a little bit into lateral thigh there is no low back pain and persistent with some worsening episodes there is no medication use to alleviate the pain and no specific trauma initiating event  The following portions of the patient's history were reviewed and updated as appropriate:PMHroutine: Social history , Past Medical History, Allergies, Current Medications, Active Problem List and Health Maintenance    Review of Systems   Constitutional: Negative for activity change, appetite change and unexpected weight change.   HENT: Negative for congestion, nosebleeds and trouble swallowing.    Eyes: Negative for pain and visual disturbance.   Respiratory: Positive for shortness of breath. Negative for apnea, cough, choking, chest tightness and wheezing.    Cardiovascular: Negative for chest pain, palpitations and leg swelling.   Gastrointestinal: Negative for abdominal pain and blood in stool.   Endocrine: Negative.    Genitourinary: Negative for decreased urine volume, difficulty urinating, hematuria and urgency.   Musculoskeletal: Negative for joint swelling.   Skin: Negative for color change and rash.   Allergic/Immunologic: Negative.    Neurological: Negative for syncope and speech difficulty.   Hematological: Negative for adenopathy.   Psychiatric/Behavioral: Negative for agitation and confusion.   All other systems reviewed and are negative.      Objective   Vitals:    10/25/18 0753   BP: 138/80   BP Location: Right arm   Patient Position: Sitting   Cuff Size: Large Adult   Pulse: 93   Temp: 98.5 °F (36.9 °C)   TempSrc: Oral   SpO2: 98%   Weight: 98.9 kg (218 lb 1.6 oz)   Height: 177.8 cm (70\")     Body mass index is 31.29 kg/m².  Physical Exam   Constitutional: He is oriented to person, place, and time. He appears " well-developed and well-nourished. No distress.   HENT:   Head: Normocephalic and atraumatic.   Right Ear: External ear normal.   Left Ear: External ear normal.   Mouth/Throat: Oropharynx is clear and moist.   Eyes: Pupils are equal, round, and reactive to light. Conjunctivae and EOM are normal. Right eye exhibits no discharge. Left eye exhibits no discharge. No scleral icterus.   Neck: Normal range of motion. Neck supple. No tracheal deviation present. No thyromegaly present.   Cardiovascular: Normal rate, regular rhythm, normal heart sounds, intact distal pulses and normal pulses.    Pulmonary/Chest: Effort normal and breath sounds normal. No respiratory distress. He has no wheezes. He has no rales.   Abdominal: Soft. Bowel sounds are normal. There is no tenderness.   Musculoskeletal: Normal range of motion. He exhibits no edema, tenderness or deformity.   Neurological: He is alert and oriented to person, place, and time. He exhibits normal muscle tone. Coordination normal.   Skin: Skin is warm. No rash noted. No erythema. No pallor.   Psychiatric: He has a normal mood and affect. His behavior is normal. Judgment and thought content normal.   Nursing note and vitals reviewed.    Reviewed Data:  Office Visit on 10/25/2018   Component Date Value Ref Range Status   • PSA 10/25/2018 3.560  0.000 - 4.000 ng/mL Final   • Glucose 10/25/2018 82  65 - 99 mg/dL Final   • BUN 10/25/2018 12  8 - 23 mg/dL Final   • Creatinine 10/25/2018 0.94  0.76 - 1.27 mg/dL Final   • eGFR Non  Am 10/25/2018 79  >60 mL/min/1.73 Final   • eGFR African Am 10/25/2018 96  >60 mL/min/1.73 Final   • BUN/Creatinine Ratio 10/25/2018 12.8  7.0 - 25.0 Final   • Sodium 10/25/2018 141  136 - 145 mmol/L Final   • Potassium 10/25/2018 4.3  3.5 - 5.2 mmol/L Final   • Chloride 10/25/2018 98  98 - 107 mmol/L Final   • Total CO2 10/25/2018 33.8* 22.0 - 29.0 mmol/L Final   • Calcium 10/25/2018 10.5  8.6 - 10.5 mg/dL Final   • Total Protein 10/25/2018  8.6* 6.0 - 8.5 g/dL Final   • Albumin 10/25/2018 4.80  3.50 - 5.20 g/dL Final   • Globulin 10/25/2018 3.8  gm/dL Final   • A/G Ratio 10/25/2018 1.3  g/dL Final   • Total Bilirubin 10/25/2018 0.4  0.1 - 1.2 mg/dL Final   • Alkaline Phosphatase 10/25/2018 93  39 - 117 U/L Final   • AST (SGOT) 10/25/2018 22  1 - 40 U/L Final   • ALT (SGPT) 10/25/2018 29  1 - 41 U/L Final     ECG 12 Lead  Date/Time: 10/25/2018 8:20 AM  Performed by: AIDAN RICO  Authorized by: AIDAN RICO   Comparison: not compared with previous ECG   Previous ECG: no previous ECG available  Rhythm: sinus rhythm  Conduction: right bundle branch block  QRS axis: left  Clinical impression: abnormal ECG

## 2018-10-26 NOTE — TELEPHONE ENCOUNTER
----- Message from Jus Durham MD sent at 10/25/2018  4:19 PM EDT -----  No evidence of arthritis and hip recommend consultation with physical therapy for right hip pain

## 2018-11-02 NOTE — PROGRESS NOTES
Physical Therapy Initial Evaluation and Plan of Care    Patient: Fredis Lockwood   : 1950  Diagnosis/ICD-10 Code:  Tight unbalanced muscles [R29.898]  Referring practitioner: Jus Durham MD  Past Medical History Reviewed: 2018    PLOF: independent, rides motorcycles    Subjective Evaluation    History of Present Illness  Mechanism of injury: I have been getting stiff from my R hip down to my knee. It hurts when I am sitting and when I go from sitting to standing. The pain is on the outside of my R leg. I may have a little pain in the back of the leg. Sometime the pain on the outside of the leg is a sharp pain. I don't have any back pain, but sometimes with heavy lifting. This started about a month or two ago. I went to get up one day and it was hurting. I can't remember doing anythign to hurt it. I ride a motorcycle and I don't usually feel it when I am riding. I sit straight up on my motorcycle.   Walking isnt painful. The pain mainly starts after I sit for long periods of time. If I get up and walk around it will go away, I can still feel it but its not like it is when I first stand up.      Patient Occupation: Part time, security, standing/sitting/walking Pain  Current pain ratin  At best pain ratin  At worst pain ratin  Location: R leg   Quality: sharp (ache)  Relieving factors: rest (creame)  Aggravating factors: squatting and sleeping (prolonged sitting)  Progression: no change         Objective       Static Posture     Comments  With sitting: Pt sits on L side  When standing: Pt puts weight on L leg with R externally rotated, reports no pain with WBing    Tenderness     Right Hip   No tenderness in the greater trochanter, iliac crest and sacroiliac joint.     Lumbar Screen  Lumbar range of motion within normal limits.    Active Range of Motion   Left Hip   Normal active range of motion    Right Hip   Normal active range of motion    Strength/Myotome Testing     Left Hip   Planes  of Motion   Flexion: 5  Extension: 5  Abduction: 4+  Adduction: 5  External rotation: 5  Internal rotation: 5    Right Hip   Planes of Motion   Flexion: 5  Extension: 5  Abduction: 4+  Adduction: 5  External rotation: 5  Internal rotation: 5    Tests     Lumbar     Left   Negative crossed SLR and passive SLR.     Right   Negative crossed SLR and passive SLR.     Left Hip   Negative SABINO and scour.   90/90 SLR: Positive.     Right Hip   Positive Stephen and piriformis.   Negative SABINO and scour.   90/90 SLR: Positive.     Additional Tests Details  - Dixie test on RLE    Functional Assessment     Comments  5x sit to stand: 12 sec  TUG: 10 sec           Assessment & Plan     Assessment  Impairments: abnormal gait, abnormal or restricted ROM, activity intolerance, impaired balance, impaired physical strength and pain with function  Assessment details: Pt presents to therapy with tight hip musculature of the RLE. Pt demonstrates tight piriformis, ITB, and hamstrings, difficulty transferring from sit to stand and pain after prolonged sitting. Pt educated on trying stretches consistently for a week twice a day and the use of ice when in pain. Pt was also encouraged to try and get up and walk every hour to prevent periods of prolonged sitting. Pt would benefit from skilled therapy to address impairments listed above to decrease pain and increase function.   Prognosis: good    Goals  Plan Goals:  SHORT TERM GOALS: 6-8 visits  1.  Patient to be compliant with HEP and demo good efficiency with TE  2.  Report < 2 sleep disturbances 2° hip pain.     3. Pt will report minimal-no tenderness to palpation with firm pressure.  4. Pt will demonstrate increased extensibility of ITB and hamstring muscles.      LONG TERM GOALS: 12-16 visits  1.  Pt. to score > 70/80 perceived ability on LEFS  2.  Pain level < 4/10 in hips with all activities including sitting > 1 hr continuously.   3.  Hip  AROM to WNL to allow for return to ADL's/IADLS  and functional activities without increased symptoms  4. Hip strength to 5/5  to allow for pushing, pulling and more strenuous activities to occur without pain.   5. No palpable tenderness to the hip.       Plan  Therapy options: will be seen for skilled physical therapy services  Planned modality interventions: cryotherapy, electrical stimulation/Russian stimulation, iontophoresis, TENS, thermotherapy (hydrocollator packs), traction and ultrasound  Planned therapy interventions: abdominal trunk stabilization, ADL retraining, body mechanics training, balance/weight-bearing training, flexibility, functional ROM exercises, gait training, home exercise program, joint mobilization, manual therapy, neuromuscular re-education, postural training, soft tissue mobilization, spinal/joint mobilization, strengthening, stretching, therapeutic activities and transfer training  Frequency: 2x week  Duration in weeks: 10  Treatment plan discussed with: patient  Plan details: Continue to progress pt in strengthening/stretching/stability/functional activities per pt tolerance.         Manual Therapy:    -     mins  46563;  Therapeutic Exercise:    10     mins  36922;     Neuromuscular Alejandra:    -    mins  90246;    Therapeutic Activity:     -     mins  82089;     Gait Training:      -     mins  42702;     Ultrasound:     -     mins  92475;    Electrical Stimulation:    -     mins  65786 ( );  Dry Needling     -     mins self-pay    Timed Treatment:   10   mins   Total Treatment:     50   mins    Treatment this date performed by Helen Hilliard, PT student.      I was present in the PT department guiding the student by approving, concurring and confirming the skilled judgement for all services rendered.        PT SIGNATURE: Eryn Carey, AZUCENA   DATE TREATMENT INITIATED: 11/5/2018    Medicare Initial Certification  Certification Period: 2/3/2019  I certify that the therapy services are furnished while this patient is under my  care.  The services outlined above are required by this patient, and will be reviewed every 90 days.     PHYSICIAN: Jus Durham MD      DATE:     Please sign and return via fax to [phone].. Thank you, [facility].

## 2018-11-28 NOTE — PROGRESS NOTES
Date of Office Visit: 2018  Encounter Provider: Fredis Collado MD  Place of Service: [facility] CARDIOLOGY  Patient Name: Fredis Lockwood  :1950  Jus Durham MD    Chief Complaint   Patient presents with   • Shortness of Breath   • Abnormal ECG     History of Present Illness    The patient is a 68-year-old white male with a history of hypertension and hyperlipidemia who presents to the office today at the request of Dr. Jus Durham for evaluation of an abnormal electrocardiogram and exertional dyspnea.    The patient reports over the past few years he has had exertional dyspnea.  He is a long-term smoker and the feeling in his sleep probably has underlying COPD.  He was recently started on inhaler which he uses inconsistently.  He states it does help when he does use it.  He notices when he climbs stairs that is his most difficult time with shortness of breath.  He does not really complain of any angina pectoris.  He denies any orthopnea or paroxysmal nocturnal dyspnea.  He has no complaints of peripheral edema or palpitations.    His risk factors for coronary disease include continued tobacco use, hypertension, hyperlipidemia as well as a family history.  He has no known history of diabetes mellitus.    Past Medical History:   Diagnosis Date   • Gastric ulcer          Past Surgical History:   Procedure Laterality Date   • COLONOSCOPY  2012    Normal Colonoscopy.   • HERNIA REPAIR      Inguinal   • OTHER SURGICAL HISTORY      ruptured ulcer   • VASECTOMY             Current Outpatient Medications:   •  amLODIPine (NORVASC) 10 MG tablet, Take 1 tablet by mouth Daily., Disp: 90 tablet, Rfl: 3  •  aspirin 81 MG EC tablet, Take 81 mg by mouth daily., Disp: , Rfl:   •  atorvastatin (LIPITOR) 10 MG tablet, Take 10 mg by mouth Daily., Disp: , Rfl: 3  •  budesonide-formoterol (SYMBICORT) 80-4.5 MCG/ACT inhaler, Inhale 2 puffs 2 (Two) Times a Day., Disp: 1 inhaler,  Rfl: 3  •  Cholecalciferol (VITAMIN D3) 400 UNITS capsule, Take 1 tablet by mouth daily., Disp: , Rfl:   •  losartan (COZAAR) 100 MG tablet, Take 1 tablet by mouth Daily., Disp: 90 tablet, Rfl: 3  •  Multiple Vitamin (MULTIVITAMINS PO), Take 1 tablet by mouth daily., Disp: , Rfl:   •  sildenafil (VIAGRA) 100 MG tablet, Take 1/2  tablet daily one hour before needed erection., Disp: 6 tablet, Rfl: 6  •  tamsulosin (FLOMAX) 0.4 MG capsule 24 hr capsule, Take 1 capsule by mouth Daily., Disp: 90 capsule, Rfl: 3      Social History     Socioeconomic History   • Marital status: Single     Spouse name: Not on file   • Number of children: 3   • Years of education: Not on file   • Highest education level: Not on file   Social Needs   • Financial resource strain: Not on file   • Food insecurity - worry: Not on file   • Food insecurity - inability: Not on file   • Transportation needs - medical: Not on file   • Transportation needs - non-medical: Not on file   Occupational History   • Not on file   Tobacco Use   • Smoking status: Current Every Day Smoker     Packs/day: 0.75   • Smokeless tobacco: Never Used   Substance and Sexual Activity   • Alcohol use: Yes   • Drug use: Not on file   • Sexual activity: Not on file   Other Topics Concern   • Not on file   Social History Narrative   • Not on file         Review of Systems   Constitution: Negative.   HENT: Negative.    Eyes: Negative.    Cardiovascular: Positive for dyspnea on exertion.   Respiratory: Negative.    Endocrine: Negative.    Skin: Negative.    Musculoskeletal: Negative.    Gastrointestinal: Negative.    Neurological: Negative.    Psychiatric/Behavioral: Negative.        Procedures      ECG 12 Lead  Date/Time: 11/28/2018 10:49 AM  Performed by: Freids Collado MD  Authorized by: Fredis Collado MD   Comparison: compared with previous ECG from 10/25/2018  Similar to previous ECG  Rhythm: sinus rhythm  Rate: normal  Conduction: right bundle branch block and  "LAFB  QRS axis: left                  Objective:    /80   Pulse 92   Ht 177.8 cm (70\")   Wt 98.4 kg (217 lb)   BMI 31.14 kg/m²         Physical Exam   Constitutional: He is oriented to person, place, and time. He appears well-developed and well-nourished.   HENT:   Head: Normocephalic.   Eyes: Pupils are equal, round, and reactive to light.   Neck: Normal range of motion. No JVD present. Carotid bruit is not present. No thyromegaly present.   Cardiovascular: Normal rate, regular rhythm, S1 normal, S2 normal, normal heart sounds and intact distal pulses. Exam reveals no gallop and no friction rub.   No murmur heard.  Pulmonary/Chest: Effort normal and breath sounds normal.   Abdominal: Soft. Bowel sounds are normal.   Musculoskeletal: He exhibits no edema.   Neurological: He is alert and oriented to person, place, and time.   Skin: Skin is warm, dry and intact. No erythema.   Psychiatric: He has a normal mood and affect.   Vitals reviewed.          Assessment:       Diagnosis Plan   1. Shortness of breath  Stress Test With Myocardial Perfusion One Day   2. Abnormal EKG  Stress Test With Myocardial Perfusion One Day   3. Bundle branch block, right  Stress Test With Myocardial Perfusion One Day   4. Elevated cholesterol         The patient has an abnormal electrocardiogram which does not appear to be a significant concern.  It more than likely represents an aging of his electrical system.  More concerning is his continued tobacco abuse and his shortness of breath with exertion.  He has significant risk factors for coronary disease I think he warrants an exercise stress test.  We will need to perform a Cardiolite study in view of his abnormal electrocardiogram.  Plan:       I appreciate the opportunity to evaluate this patient in consultation  "

## 2018-12-06 NOTE — PROGRESS NOTES
Physical Therapy Daily Progress Note  Visit: 2    Fredis Lockwood reports: The stretches have made me feel better. I have also been trying to get up and walk around more.     Subjective     Objective   See Exercise, Manual, and Modality Logs for complete treatment.       Assessment/Plan   Pt tolerated treatment well. His achy pain has subsided with stretching his lower extremities and increasing his activity. Pt was able to perform strengthening exercises without pain.   Plan: continue to progress strengthening/stability/functional exercises per pt tolerance.     Manual Therapy:    -     mins  42219;  Therapeutic Exercise:    35     mins  57577;     Neuromuscular Alejandra:    -    mins  71018;    Therapeutic Activity:     -     mins  78907;     Gait Training:      -     mins  23634;     Ultrasound:     -     mins  27348;    Electrical Stimulation:    -     mins  70727 ( );  Dry Needling     -     mins self-pay    Timed Treatment:   35   mins   Total Treatment:     45   mins    Eryn Carey PT  [place] License #: [id number]    Physical Therapist

## 2018-12-07 PROBLEM — R94.39 ABNORMAL STRESS TEST: Status: ACTIVE | Noted: 2018-01-01

## 2018-12-07 PROBLEM — I42.0 DILATED CARDIOMYOPATHY (HCC): Status: ACTIVE | Noted: 2018-01-01

## 2018-12-07 PROBLEM — I20.8 STABLE ANGINA (HCC): Status: ACTIVE | Noted: 2018-01-01

## 2018-12-07 NOTE — PROGRESS NOTES
Mr Lockwood presented for stress testing -- abnormal/high risk result.  I spoke with him personally.  His symptoms are stable.  We will arrange cor angio next week, as well as RHC due to findings of dilated CMP/EF 24%.

## 2018-12-14 PROBLEM — I25.119 CORONARY ARTERY DISEASE INVOLVING NATIVE CORONARY ARTERY OF NATIVE HEART WITH ANGINA PECTORIS (HCC): Status: ACTIVE | Noted: 2018-01-01

## 2018-12-17 NOTE — ANESTHESIA PREPROCEDURE EVALUATION
Anesthesia Evaluation     NPO Solid Status: > 8 hours             Airway   Mallampati: II  TM distance: >3 FB  Neck ROM: full  Dental - normal exam     Pulmonary - normal exam   (+) COPD, shortness of breath,   Cardiovascular - normal exam    (+) hypertension, CAD, dysrhythmias, angina, CASTRO, hyperlipidemia,       Neuro/Psych  GI/Hepatic/Renal/Endo      Musculoskeletal     Abdominal    Substance History      OB/GYN          Other                        Anesthesia Plan    ASA 4     general     Anesthetic plan, all risks, benefits, and alternatives have been provided, discussed and informed consent has been obtained with: patient.

## 2018-12-17 NOTE — ANESTHESIA PROCEDURE NOTES
ANESTHESIA INTUBATION  Difficult airway    General Information and Staff    Patient location during procedure: OR  Anesthesiologist: Luis Ventura MD    Indications and Patient Condition    Preoxygenated: yes  Mask difficulty assessment: 2 - vent by mask + OA or adjuvant +/- NMBA    Final Airway Details  Final airway type: endotracheal airway      Successful airway: ETT  Cuffed: yes   Successful intubation technique: video laryngoscopy  Facilitating devices/methods: intubating stylet  Endotracheal tube insertion site: oral  Blade: CMAC  Blade size: D  ETT size (mm): 8.0  Placement verified by: capnometry   Measured from: teeth  ETT to teeth (cm): 23  Number of attempts at approach: 2    Additional Comments  Grade III view with Mac 3.  Atraumatic intubation with CMAC.

## 2018-12-17 NOTE — ANESTHESIA PROCEDURE NOTES
Procedure Performed: Emergent/Open-Heart Anesthesia ALEISHA     Start Time:        End Time:        General Procedure Information  Diagnostic Indications for Echo:  assessment of surgical repair  Physician Requesting Echo: Servando Diez MD  Location performed:  OR  Intubated  Bite block not placed  Heart visualized  Probe Insertion:  Easy  Probe Type:  Multiplane  Modalities:  2D only and color flow mapping    Echocardiographic and Doppler Measurements    Ventricles    Right Ventricle:  Cavity size normal.    Left Ventricle:  Cavity size dilated.  Global Function moderately impaired.  Ejection Fraction 30%.          Valves    Aortic Valve:  Annulus normal.  Stenosis not present.  Regurgitation absent.  Leaflets normal.  Leaflet motions normal.      Mitral Valve:  Annulus normal.  Stenosis not present.  Vena Contracta Width: 0.5 cm.  Regurgitation moderate.  Leaflets calcified and thickened.  Leaflet motions restricted.      Pulmonic Valve:  Annulus normal.          Aorta    Ascending Aorta:  Size normal.          Atria      Left Atrium:  Size dilated.  Left atrial appendage normal.      Septa    Atrial Septum:  Intra-atrial septal morphology normal.                  Anesthesia Information      Echocardiogram Comments:       Diagnostic ALEISHA.  Diagnosis: non-rheumatic mitral regurgitation.  2 mitral regurgitation jets seen, but small color jet area with vena contracta 0.5 cm, consistent with mild to moderate mitral regurgitation.  Findings discussed with Dr. Diez.

## 2018-12-17 NOTE — ANESTHESIA PROCEDURE NOTES
Central Line      Patient reassessed immediately prior to procedure    Patient location during procedure: OR  Start time: 12/17/2018 7:05 AM  Stop Time:12/17/2018 7:15 AM  Indications: vascular access and central pressure monitoring  Staff  Anesthesiologist: Luis Ventura MD  Preanesthetic Checklist  Completed: patient identified and risks and benefits discussed  Central Line Prep  Sterile Tech:cap, gloves, gown, mask and sterile barriers  Prep: chloraprep  Patient monitoring: blood pressure monitoring, continuous pulse oximetry and EKG  Central Line Procedure  Laterality:right  Location:internal jugular  Catheter Type:double lumen and MAC  Catheter Size:9 Fr  Guidance:ultrasound guided  PROCEDURE NOTE/ULTRASOUND INTERPRETATION.  Using ultrasound guidance the potential vascular sites for insertion of the catheter were visualized to determine the patency of the vessel to be used for vascular access.  After selecting the appropriate site for insertion, the needle was visualized under ultrasound being inserted into the internal jugular vein, followed by ultrasound confirmation of wire and catheter placement. There were no abnormalities seen on ultrasound; an image was taken; and the patient tolerated the procedure with no complications.   Assessment  Post procedure:biopatch applied, line sutured, occlusive dressing applied and secured with tape  Assessement:blood return through all ports  Complications:no  Patient Tolerance:patient tolerated the procedure well with no apparent complications  Additional Notes  Ultrasound Interpretation:  Using ultrasound guidance the potential vascular sites for insertion of the catheter were visualized to determine the patency of the vessel to be used for vascular access.  After selecting the appropriate site for insertion, the needle was visualized under ultrasound being inserted into the vessel, followed by ultrasound confirmation of wire and catheter placement.  There were no  abnormalities seen on ultrasound; an image was taken/ and the patient tolerated the procedure with no complications.

## 2018-12-17 NOTE — ANESTHESIA PROCEDURE NOTES
Arterial Line      Patient reassessed immediately prior to procedure    Patient location during procedure: OR  Start time: 12/17/2018 6:45 AM  Stop Time:12/17/2018 6:50 AM       Performed By   Anesthesiologist: Luis Ventura MD  Preanesthetic Checklist  Completed: patient identified, surgical consent, pre-op evaluation, timeout performed and risks and benefits discussed  Arterial Line Prep   Prep: ChloraPrep and alcohol swabs  Patient monitoring: blood pressure monitoring, continuous pulse oximetry and EKG  Arterial Line Procedure   Laterality:left  Location:  radial artery  Catheter size: 20 G   Guidance: landmark technique and palpation technique  Number of attempts: 1  Successful placement: yes          Post Assessment   Dressing Type: occlusive dressing applied and secured with tape.   Complications no  Patient Tolerance: patient tolerated the procedure well with no apparent complications

## 2018-12-17 NOTE — ANESTHESIA PROCEDURE NOTES
Central Line      Patient reassessed immediately prior to procedure    Patient location during procedure: OR  Start time: 12/17/2018 7:15 AM  Stop Time:12/17/2018 7:20 AM  Indications: central pressure monitoring, vascular access and MD/Surgeon request  Staff  Anesthesiologist: Luis Ventura MD  Preanesthetic Checklist  Completed: patient identified, surgical consent, pre-op evaluation, timeout performed and risks and benefits discussed  Central Line Prep  Sterile Tech:cap, gloves, gown, mask and sterile barriers  Prep: chloraprep  Patient monitoring: blood pressure monitoring, continuous pulse oximetry and EKG  Central Line Procedure  Laterality:right  Location:internal jugular  Catheter Type:[place]-Rogelio  Assessment  Post procedure:biopatch applied, line sutured and occlusive dressing applied  Assessement:blood return through all ports and free fluid flow  Complications:no  Patient Tolerance:patient tolerated the procedure well with no apparent complications

## 2018-12-18 NOTE — ANESTHESIA POSTPROCEDURE EVALUATION
Patient: Fredis Lockwood    Procedure Summary     Date:  12/17/18 Room / Location:  [facility] OR [address] [place] [address] MAIN OR    Anesthesia Start:  0637 Anesthesia Stop:  1202    Procedure:  INTRAOP ALEISHA; CORONARY ARTERY BYPASS X7 WITH LEFT INTERNAL MAMMARY ARTERY GRAFT AND UTILIZING ENDOSCOPICALLY HARVESTED LEFT SAPHENOUS VEIN; PRP (N/A Chest) Diagnosis:       Coronary artery disease involving native coronary artery of native heart with angina pectoris (CMS/HCC)      (Coronary artery disease involving native coronary artery of native heart with angina pectoris (CMS/HCC) [I25.119])    Surgeon:  Servando Diez MD Provider:  Luis Ventura MD    Anesthesia Type:  general ASA Status:  4          Anesthesia Type: general  Last vitals  BP   110/60 (12/18/18 1300)   Temp   36.7 °C (98 °F) (12/16/18 2356)   Pulse   93 (12/18/18 1320)   Resp   16 (12/18/18 1320)     SpO2   94 % (12/18/18 1320)     Post Anesthesia Care and Evaluation    Patient location during evaluation: bedside  Pain management: adequate  Airway patency: patent  Anesthetic complications: No anesthetic complications    Cardiovascular status: acceptable  Respiratory status: acceptable  Hydration status: acceptable

## 2018-12-23 PROBLEM — I20.8 STABLE ANGINA (HCC): Status: RESOLVED | Noted: 2018-01-01 | Resolved: 2018-01-01

## 2018-12-23 PROBLEM — R94.39 ABNORMAL STRESS TEST: Status: RESOLVED | Noted: 2018-01-01 | Resolved: 2018-01-01

## 2018-12-24 NOTE — OUTREACH NOTE
Prep Survey      Responses   Facility patient discharged from?  [facility]   Is patient eligible?  Yes   Discharge diagnosis  12/17 CAD  SEVERE COPD   Does the patient have one of the following disease processes/diagnoses(primary or secondary)?  Cardiothoracic surgery   Does the patient have Home health ordered?  Yes   What is the Home health agency?   [facility]   Is there a DME ordered?  Yes   What DME was ordered?  [facility]   Prep survey completed?  Yes          Nina Hankins RN

## 2018-12-28 NOTE — OUTREACH NOTE
CT Surgery Week 1 Survey      Responses   Facility patient discharged from?  [facility]   Does the patient have one of the following disease processes/diagnoses(primary or secondary)?  Cardiothoracic surgery   Is there a successful TCM telephone encounter documented?  No   Week 1 attempt successful?  No   Unsuccessful attempts  Attempt 1            Bri Marrero RN

## 2018-12-31 PROBLEM — G47.34 NOCTURNAL HYPOXEMIA: Status: ACTIVE | Noted: 2018-01-01

## 2018-12-31 PROBLEM — I25.5 ISCHEMIC CARDIOMYOPATHY: Status: ACTIVE | Noted: 2018-01-01

## 2018-12-31 PROBLEM — Z95.1 S/P CABG (CORONARY ARTERY BYPASS GRAFT): Status: ACTIVE | Noted: 2018-01-01

## 2018-12-31 PROBLEM — J44.9 COPD (CHRONIC OBSTRUCTIVE PULMONARY DISEASE) (HCC): Status: ACTIVE | Noted: 2018-01-01

## 2018-12-31 PROBLEM — I71.40 ABDOMINAL AORTIC ANEURYSM (AAA) WITHOUT RUPTURE (HCC): Status: ACTIVE | Noted: 2018-01-01

## 2019-01-01 ENCOUNTER — READMISSION MANAGEMENT (OUTPATIENT)
Dept: CALL CENTER | Facility: HOSPITAL | Age: 69
End: 2019-01-01

## 2019-01-01 ENCOUNTER — EPISODE CHANGES (OUTPATIENT)
Dept: CASE MANAGEMENT | Facility: OTHER | Age: 69
End: 2019-01-01

## 2019-01-01 ENCOUNTER — OFFICE VISIT (OUTPATIENT)
Dept: CARDIOLOGY | Facility: CLINIC | Age: 69
End: 2019-01-01

## 2019-01-01 ENCOUNTER — HOSPITAL ENCOUNTER (INPATIENT)
Facility: HOSPITAL | Age: 69
LOS: 5 days | Discharge: NURSING, REHAB OR LONG-TERM CARE | End: 2019-02-05
Attending: EMERGENCY MEDICINE | Admitting: INTERNAL MEDICINE

## 2019-01-01 ENCOUNTER — APPOINTMENT (OUTPATIENT)
Dept: GENERAL RADIOLOGY | Facility: HOSPITAL | Age: 69
End: 2019-01-01

## 2019-01-01 ENCOUNTER — APPOINTMENT (OUTPATIENT)
Dept: SLEEP MEDICINE | Facility: HOSPITAL | Age: 69
End: 2019-01-01
Attending: INTERNAL MEDICINE

## 2019-01-01 ENCOUNTER — HOSPITAL ENCOUNTER (OUTPATIENT)
Dept: GENERAL RADIOLOGY | Facility: HOSPITAL | Age: 69
Discharge: HOME | End: 2019-01-04
Admitting: FAMILY MEDICINE

## 2019-01-01 ENCOUNTER — TELEPHONE (OUTPATIENT)
Dept: CARDIAC REHAB | Facility: HOSPITAL | Age: 69
End: 2019-01-01

## 2019-01-01 ENCOUNTER — TELEPHONE (OUTPATIENT)
Dept: CARDIOLOGY | Facility: CLINIC | Age: 69
End: 2019-01-01

## 2019-01-01 ENCOUNTER — OFFICE VISIT (OUTPATIENT)
Dept: INTERNAL MEDICINE | Facility: CLINIC | Age: 69
End: 2019-01-01

## 2019-01-01 ENCOUNTER — APPOINTMENT (OUTPATIENT)
Dept: CT IMAGING | Facility: HOSPITAL | Age: 69
End: 2019-01-01

## 2019-01-01 ENCOUNTER — PATIENT OUTREACH (OUTPATIENT)
Dept: CASE MANAGEMENT | Facility: OTHER | Age: 69
End: 2019-01-01

## 2019-01-01 ENCOUNTER — HOSPITAL ENCOUNTER (INPATIENT)
Facility: HOSPITAL | Age: 69
LOS: 5 days | Discharge: HOME | End: 2019-01-11
Attending: EMERGENCY MEDICINE | Admitting: INTERNAL MEDICINE

## 2019-01-01 VITALS
SYSTOLIC BLOOD PRESSURE: 163 MMHG | DIASTOLIC BLOOD PRESSURE: 91 MMHG | HEIGHT: 72 IN | RESPIRATION RATE: 16 BRPM | HEART RATE: 79 BPM | TEMPERATURE: 97.6 F | OXYGEN SATURATION: 97 % | WEIGHT: 227.74 LBS | BODY MASS INDEX: 30.85 KG/M2

## 2019-01-01 VITALS
DIASTOLIC BLOOD PRESSURE: 76 MMHG | HEIGHT: 70 IN | HEART RATE: 76 BPM | WEIGHT: 220 LBS | SYSTOLIC BLOOD PRESSURE: 140 MMHG | BODY MASS INDEX: 31.5 KG/M2

## 2019-01-01 VITALS
OXYGEN SATURATION: 76 % | DIASTOLIC BLOOD PRESSURE: 50 MMHG | HEIGHT: 70 IN | TEMPERATURE: 99.2 F | WEIGHT: 221.5 LBS | SYSTOLIC BLOOD PRESSURE: 100 MMHG | HEART RATE: 80 BPM | BODY MASS INDEX: 31.71 KG/M2

## 2019-01-01 VITALS
DIASTOLIC BLOOD PRESSURE: 88 MMHG | HEIGHT: 70 IN | WEIGHT: 206.9 LBS | OXYGEN SATURATION: 94 % | SYSTOLIC BLOOD PRESSURE: 144 MMHG | BODY MASS INDEX: 29.62 KG/M2 | RESPIRATION RATE: 20 BRPM | HEART RATE: 77 BPM | TEMPERATURE: 98 F

## 2019-01-01 VITALS
DIASTOLIC BLOOD PRESSURE: 72 MMHG | WEIGHT: 220 LBS | HEART RATE: 74 BPM | BODY MASS INDEX: 31.5 KG/M2 | SYSTOLIC BLOOD PRESSURE: 144 MMHG | HEIGHT: 70 IN

## 2019-01-01 DIAGNOSIS — J44.9 CHRONIC OBSTRUCTIVE PULMONARY DISEASE, UNSPECIFIED COPD TYPE (HCC): ICD-10-CM

## 2019-01-01 DIAGNOSIS — I25.5 ISCHEMIC CARDIOMYOPATHY: ICD-10-CM

## 2019-01-01 DIAGNOSIS — G47.34 NOCTURNAL HYPOXEMIA: ICD-10-CM

## 2019-01-01 DIAGNOSIS — I10 BENIGN ESSENTIAL HTN: Primary | ICD-10-CM

## 2019-01-01 DIAGNOSIS — Z95.1 S/P CABG (CORONARY ARTERY BYPASS GRAFT): ICD-10-CM

## 2019-01-01 DIAGNOSIS — E78.00 ELEVATED CHOLESTEROL: ICD-10-CM

## 2019-01-01 DIAGNOSIS — R09.02 HYPOXIA: ICD-10-CM

## 2019-01-01 DIAGNOSIS — N18.30 ANEMIA DUE TO STAGE 3 CHRONIC KIDNEY DISEASE (HCC): ICD-10-CM

## 2019-01-01 DIAGNOSIS — R77.8 ELEVATED TROPONIN: ICD-10-CM

## 2019-01-01 DIAGNOSIS — I10 BENIGN ESSENTIAL HTN: ICD-10-CM

## 2019-01-01 DIAGNOSIS — Z72.0 TOBACCO ABUSE: ICD-10-CM

## 2019-01-01 DIAGNOSIS — I71.40 ABDOMINAL AORTIC ANEURYSM (AAA) WITHOUT RUPTURE (HCC): ICD-10-CM

## 2019-01-01 DIAGNOSIS — R09.02 HYPOXEMIA: ICD-10-CM

## 2019-01-01 DIAGNOSIS — I25.119 CORONARY ARTERY DISEASE INVOLVING NATIVE CORONARY ARTERY OF NATIVE HEART WITH ANGINA PECTORIS (HCC): Primary | ICD-10-CM

## 2019-01-01 DIAGNOSIS — J96.02 ACUTE HYPERCAPNIC RESPIRATORY FAILURE (HCC): Primary | ICD-10-CM

## 2019-01-01 DIAGNOSIS — R73.9 HYPERGLYCEMIA: ICD-10-CM

## 2019-01-01 DIAGNOSIS — I25.119 CORONARY ARTERY DISEASE INVOLVING NATIVE CORONARY ARTERY OF NATIVE HEART WITH ANGINA PECTORIS (HCC): ICD-10-CM

## 2019-01-01 DIAGNOSIS — D63.1 ANEMIA DUE TO STAGE 3 CHRONIC KIDNEY DISEASE (HCC): ICD-10-CM

## 2019-01-01 DIAGNOSIS — R41.82 ALTERED MENTAL STATUS, UNSPECIFIED ALTERED MENTAL STATUS TYPE: Primary | ICD-10-CM

## 2019-01-01 LAB
ALBUMIN SERPL-MCNC: 3.6 G/DL (ref 3.5–5.2)
ALBUMIN SERPL-MCNC: 3.8 G/DL (ref 3.5–5.2)
ALBUMIN SERPL-MCNC: 3.9 G/DL (ref 3.5–5.2)
ALBUMIN SERPL-MCNC: 4 G/DL (ref 3.5–5.2)
ALBUMIN/GLOB SERPL: 0.9 G/DL
ALBUMIN/GLOB SERPL: 1 G/DL
ALBUMIN/GLOB SERPL: 1.1 G/DL
ALBUMIN/GLOB SERPL: 1.4 G/DL
ALP SERPL-CCNC: 79 U/L (ref 39–117)
ALP SERPL-CCNC: 84 U/L (ref 39–117)
ALP SERPL-CCNC: 87 U/L (ref 39–117)
ALP SERPL-CCNC: 92 U/L (ref 39–117)
ALT SERPL W P-5'-P-CCNC: 39 U/L (ref 1–41)
ALT SERPL W P-5'-P-CCNC: 43 U/L (ref 1–41)
ALT SERPL W P-5'-P-CCNC: 69 U/L (ref 1–41)
ALT SERPL-CCNC: 28 U/L (ref 1–41)
AMMONIA BLD-SCNC: 69 UMOL/L (ref 16–60)
ANION GAP SERPL CALCULATED.3IONS-SCNC: 10 MMOL/L
ANION GAP SERPL CALCULATED.3IONS-SCNC: 10.6 MMOL/L
ANION GAP SERPL CALCULATED.3IONS-SCNC: 11 MMOL/L
ANION GAP SERPL CALCULATED.3IONS-SCNC: 12.5 MMOL/L
ANION GAP SERPL CALCULATED.3IONS-SCNC: 5.5 MMOL/L
ANION GAP SERPL CALCULATED.3IONS-SCNC: 6.5 MMOL/L
ANION GAP SERPL CALCULATED.3IONS-SCNC: 6.7 MMOL/L
ANION GAP SERPL CALCULATED.3IONS-SCNC: 6.7 MMOL/L
ANION GAP SERPL CALCULATED.3IONS-SCNC: 6.8 MMOL/L
ANION GAP SERPL CALCULATED.3IONS-SCNC: 9 MMOL/L
ANION GAP SERPL CALCULATED.3IONS-SCNC: 9 MMOL/L
APTT PPP: 31.8 SECONDS (ref 22.7–35.4)
ARTERIAL PATENCY WRIST A: POSITIVE
AST SERPL-CCNC: 17 U/L (ref 1–40)
AST SERPL-CCNC: 28 U/L (ref 1–40)
AST SERPL-CCNC: 33 U/L (ref 1–40)
AST SERPL-CCNC: 44 U/L (ref 1–40)
ATMOSPHERIC PRESS: 749 MMHG
ATMOSPHERIC PRESS: 749 MMHG
ATMOSPHERIC PRESS: 750.3 MMHG
ATMOSPHERIC PRESS: 753.2 MMHG
ATMOSPHERIC PRESS: 753.2 MMHG
ATMOSPHERIC PRESS: 757.6 MMHG
ATMOSPHERIC PRESS: 759.1 MMHG
ATMOSPHERIC PRESS: 760.8 MMHG
ATMOSPHERIC PRESS: 761.4 MMHG
B PARAPERT DNA SPEC QL NAA+PROBE: NOT DETECTED
B PERT DNA SPEC QL NAA+PROBE: NOT DETECTED
BACTERIA UR QL AUTO: NORMAL /HPF
BASE EXCESS BLDA CALC-SCNC: 10.1 MMOL/L (ref 0–2)
BASE EXCESS BLDA CALC-SCNC: 10.9 MMOL/L (ref 0–2)
BASE EXCESS BLDA CALC-SCNC: 11.8 MMOL/L (ref 0–2)
BASE EXCESS BLDA CALC-SCNC: 12.6 MMOL/L (ref 0–2)
BASE EXCESS BLDA CALC-SCNC: 12.7 MMOL/L (ref 0–2)
BASE EXCESS BLDA CALC-SCNC: 13.4 MMOL/L (ref 0–2)
BASE EXCESS BLDA CALC-SCNC: 13.6 MMOL/L (ref 0–2)
BASE EXCESS BLDA CALC-SCNC: 14.8 MMOL/L (ref 0–2)
BASE EXCESS BLDA CALC-SCNC: 6.6 MMOL/L (ref 0–2)
BASOPHILS # BLD AUTO: 0 10*3/MM3 (ref 0–0.2)
BASOPHILS # BLD AUTO: 0.01 10*3/MM3 (ref 0–0.2)
BASOPHILS # BLD AUTO: 0.01 10*3/MM3 (ref 0–0.2)
BASOPHILS # BLD AUTO: 0.02 10*3/MM3 (ref 0–0.2)
BASOPHILS # BLD AUTO: 0.02 10*3/MM3 (ref 0–0.2)
BASOPHILS NFR BLD AUTO: 0 % (ref 0–1.5)
BASOPHILS NFR BLD AUTO: 0.1 % (ref 0–1.5)
BASOPHILS NFR BLD AUTO: 0.1 % (ref 0–1.5)
BASOPHILS NFR BLD AUTO: 0.2 % (ref 0–1.5)
BASOPHILS NFR BLD AUTO: 0.2 % (ref 0–1.5)
BDY SITE: ABNORMAL
BILIRUB SERPL-MCNC: 0.2 MG/DL (ref 0.1–1.2)
BILIRUB SERPL-MCNC: 0.3 MG/DL (ref 0.1–1.2)
BILIRUB SERPL-MCNC: 0.3 MG/DL (ref 0.1–1.2)
BILIRUB SERPL-MCNC: <0.2 MG/DL (ref 0.1–1.2)
BILIRUB UR QL STRIP: NEGATIVE
BUN BLD-MCNC: 21 MG/DL (ref 8–23)
BUN BLD-MCNC: 22 MG/DL (ref 8–23)
BUN BLD-MCNC: 23 MG/DL (ref 8–23)
BUN BLD-MCNC: 24 MG/DL (ref 8–23)
BUN BLD-MCNC: 24 MG/DL (ref 8–23)
BUN BLD-MCNC: 26 MG/DL (ref 8–23)
BUN BLD-MCNC: 30 MG/DL (ref 8–23)
BUN SERPL-MCNC: 13 MG/DL (ref 8–23)
BUN/CREAT SERPL: 12.3 (ref 7–25)
BUN/CREAT SERPL: 14.8 (ref 7–25)
BUN/CREAT SERPL: 15.3 (ref 7–25)
BUN/CREAT SERPL: 16.9 (ref 7–25)
BUN/CREAT SERPL: 18 (ref 7–25)
BUN/CREAT SERPL: 18.8 (ref 7–25)
BUN/CREAT SERPL: 20.7 (ref 7–25)
BUN/CREAT SERPL: 21 (ref 7–25)
BUN/CREAT SERPL: 22 (ref 7–25)
BUN/CREAT SERPL: 22.8 (ref 7–25)
BUN/CREAT SERPL: 24 (ref 7–25)
BUN/CREAT SERPL: 26.8 (ref 7–25)
C PNEUM DNA NPH QL NAA+NON-PROBE: NOT DETECTED
CALCIUM SERPL-MCNC: 9.2 MG/DL (ref 8.6–10.5)
CALCIUM SPEC-SCNC: 10.3 MG/DL (ref 8.6–10.5)
CALCIUM SPEC-SCNC: 8.8 MG/DL (ref 8.6–10.5)
CALCIUM SPEC-SCNC: 9 MG/DL (ref 8.6–10.5)
CALCIUM SPEC-SCNC: 9 MG/DL (ref 8.6–10.5)
CALCIUM SPEC-SCNC: 9.1 MG/DL (ref 8.6–10.5)
CALCIUM SPEC-SCNC: 9.2 MG/DL (ref 8.6–10.5)
CALCIUM SPEC-SCNC: 9.2 MG/DL (ref 8.6–10.5)
CALCIUM SPEC-SCNC: 9.3 MG/DL (ref 8.6–10.5)
CALCIUM SPEC-SCNC: 9.4 MG/DL (ref 8.6–10.5)
CALCIUM SPEC-SCNC: 9.4 MG/DL (ref 8.6–10.5)
CALCIUM SPEC-SCNC: 9.6 MG/DL (ref 8.6–10.5)
CHLORIDE SERPL-SCNC: 94 MMOL/L (ref 98–107)
CHLORIDE SERPL-SCNC: 95 MMOL/L (ref 98–107)
CHLORIDE SERPL-SCNC: 95 MMOL/L (ref 98–107)
CHLORIDE SERPL-SCNC: 96 MMOL/L (ref 98–107)
CHLORIDE SERPL-SCNC: 96 MMOL/L (ref 98–107)
CHLORIDE SERPL-SCNC: 97 MMOL/L (ref 98–107)
CHLORIDE SERPL-SCNC: 99 MMOL/L (ref 98–107)
CLARITY UR: CLEAR
CO2 SERPL-SCNC: 33.5 MMOL/L (ref 22–29)
CO2 SERPL-SCNC: 34 MMOL/L (ref 22–29)
CO2 SERPL-SCNC: 34 MMOL/L (ref 22–29)
CO2 SERPL-SCNC: 35 MMOL/L (ref 22–29)
CO2 SERPL-SCNC: 35.3 MMOL/L (ref 22–29)
CO2 SERPL-SCNC: 35.3 MMOL/L (ref 22–29)
CO2 SERPL-SCNC: 35.4 MMOL/L (ref 22–29)
CO2 SERPL-SCNC: 35.5 MMOL/L (ref 22–29)
CO2 SERPL-SCNC: 36.5 MMOL/L (ref 22–29)
CO2 SERPL-SCNC: 38.2 MMOL/L (ref 22–29)
CO2 SERPL-SCNC: 38.3 MMOL/L (ref 22–29)
CO2 SERPL-SCNC: 40 MMOL/L (ref 22–29)
COARSE GRAN CASTS URNS QL MICRO: NORMAL /LPF
COLOR UR: ABNORMAL
CREAT BLD-MCNC: 1 MG/DL (ref 0.76–1.27)
CREAT BLD-MCNC: 1 MG/DL (ref 0.76–1.27)
CREAT BLD-MCNC: 1.05 MG/DL (ref 0.76–1.27)
CREAT BLD-MCNC: 1.12 MG/DL (ref 0.76–1.27)
CREAT BLD-MCNC: 1.14 MG/DL (ref 0.76–1.27)
CREAT BLD-MCNC: 1.16 MG/DL (ref 0.76–1.27)
CREAT BLD-MCNC: 1.17 MG/DL (ref 0.76–1.27)
CREAT BLD-MCNC: 1.22 MG/DL (ref 0.76–1.27)
CREAT BLD-MCNC: 1.36 MG/DL (ref 0.76–1.27)
CREAT BLD-MCNC: 1.37 MG/DL (ref 0.76–1.27)
CREAT BLD-MCNC: 1.49 MG/DL (ref 0.76–1.27)
CREAT SERPL-MCNC: 1.06 MG/DL (ref 0.76–1.27)
D-LACTATE SERPL-SCNC: 0.7 MMOL/L (ref 0.5–2)
DEPRECATED RDW RBC AUTO: 59.8 FL (ref 37–54)
DEPRECATED RDW RBC AUTO: 61.2 FL (ref 37–54)
DEPRECATED RDW RBC AUTO: 62.6 FL (ref 37–54)
DEPRECATED RDW RBC AUTO: 62.7 FL (ref 37–54)
DEPRECATED RDW RBC AUTO: 62.7 FL (ref 37–54)
DEPRECATED RDW RBC AUTO: 63.4 FL (ref 37–54)
EOSINOPHIL # BLD AUTO: 0.01 10*3/MM3 (ref 0–0.7)
EOSINOPHIL # BLD AUTO: 0.03 10*3/MM3 (ref 0–0.7)
EOSINOPHIL # BLD AUTO: 0.06 10*3/MM3 (ref 0–0.7)
EOSINOPHIL # BLD AUTO: 0.1 10*3/MM3 (ref 0–0.7)
EOSINOPHIL # BLD AUTO: 0.13 10*3/MM3 (ref 0–0.7)
EOSINOPHIL NFR BLD AUTO: 0.1 % (ref 0.3–6.2)
EOSINOPHIL NFR BLD AUTO: 0.3 % (ref 0.3–6.2)
EOSINOPHIL NFR BLD AUTO: 0.5 % (ref 0.3–6.2)
EOSINOPHIL NFR BLD AUTO: 0.9 % (ref 0.3–6.2)
EOSINOPHIL NFR BLD AUTO: 1 % (ref 0.3–6.2)
ERYTHROCYTE [DISTWIDTH] IN BLOOD BY AUTOMATED COUNT: 16.5 % (ref 11.5–14.5)
ERYTHROCYTE [DISTWIDTH] IN BLOOD BY AUTOMATED COUNT: 16.7 % (ref 11.5–14.5)
ERYTHROCYTE [DISTWIDTH] IN BLOOD BY AUTOMATED COUNT: 16.9 % (ref 11.5–14.5)
ERYTHROCYTE [DISTWIDTH] IN BLOOD BY AUTOMATED COUNT: 17.1 % (ref 11.5–14.5)
ERYTHROCYTE [DISTWIDTH] IN BLOOD BY AUTOMATED COUNT: 17.2 % (ref 11.5–14.5)
FINE GRAN CASTS URNS QL MICRO: NORMAL /LPF
FLUAV H1 2009 PAND RNA NPH QL NAA+PROBE: NOT DETECTED
FLUAV H1 HA GENE NPH QL NAA+PROBE: NOT DETECTED
FLUAV H3 RNA NPH QL NAA+PROBE: NOT DETECTED
FLUAV SUBTYP SPEC NAA+PROBE: NOT DETECTED
FLUBV RNA ISLT QL NAA+PROBE: NOT DETECTED
GAS FLOW AIRWAY: 1 LPM
GAS FLOW AIRWAY: 15 LPM
GAS FLOW AIRWAY: 3 LPM
GFR SERPL CREATININE-BSD FRML MDRD: 47 ML/MIN/1.73
GFR SERPL CREATININE-BSD FRML MDRD: 51 ML/MIN/1.73
GFR SERPL CREATININE-BSD FRML MDRD: 52 ML/MIN/1.73
GFR SERPL CREATININE-BSD FRML MDRD: 59 ML/MIN/1.73
GFR SERPL CREATININE-BSD FRML MDRD: 62 ML/MIN/1.73
GFR SERPL CREATININE-BSD FRML MDRD: 62 ML/MIN/1.73
GFR SERPL CREATININE-BSD FRML MDRD: 64 ML/MIN/1.73
GFR SERPL CREATININE-BSD FRML MDRD: 65 ML/MIN/1.73
GFR SERPL CREATININE-BSD FRML MDRD: 70 ML/MIN/1.73
GFR SERPL CREATININE-BSD FRML MDRD: 74 ML/MIN/1.73
GFR SERPL CREATININE-BSD FRML MDRD: 74 ML/MIN/1.73
GLOBULIN SER CALC-MCNC: 2.8 GM/DL
GLOBULIN UR ELPH-MCNC: 3.8 GM/DL
GLOBULIN UR ELPH-MCNC: 3.8 GM/DL
GLOBULIN UR ELPH-MCNC: 4.1 GM/DL
GLUCOSE BLD-MCNC: 101 MG/DL (ref 65–99)
GLUCOSE BLD-MCNC: 108 MG/DL (ref 65–99)
GLUCOSE BLD-MCNC: 109 MG/DL (ref 65–99)
GLUCOSE BLD-MCNC: 113 MG/DL (ref 65–99)
GLUCOSE BLD-MCNC: 120 MG/DL (ref 65–99)
GLUCOSE BLD-MCNC: 130 MG/DL (ref 65–99)
GLUCOSE BLD-MCNC: 133 MG/DL (ref 65–99)
GLUCOSE BLD-MCNC: 133 MG/DL (ref 65–99)
GLUCOSE BLD-MCNC: 134 MG/DL (ref 65–99)
GLUCOSE BLD-MCNC: 144 MG/DL (ref 65–99)
GLUCOSE BLD-MCNC: 155 MG/DL (ref 65–99)
GLUCOSE BLDC GLUCOMTR-MCNC: 107 MG/DL (ref 70–130)
GLUCOSE BLDC GLUCOMTR-MCNC: 114 MG/DL (ref 70–130)
GLUCOSE BLDC GLUCOMTR-MCNC: 115 MG/DL (ref 70–130)
GLUCOSE BLDC GLUCOMTR-MCNC: 125 MG/DL (ref 70–130)
GLUCOSE BLDC GLUCOMTR-MCNC: 127 MG/DL (ref 70–130)
GLUCOSE BLDC GLUCOMTR-MCNC: 129 MG/DL (ref 70–130)
GLUCOSE BLDC GLUCOMTR-MCNC: 130 MG/DL (ref 70–130)
GLUCOSE BLDC GLUCOMTR-MCNC: 131 MG/DL (ref 70–130)
GLUCOSE BLDC GLUCOMTR-MCNC: 131 MG/DL (ref 70–130)
GLUCOSE BLDC GLUCOMTR-MCNC: 132 MG/DL (ref 70–130)
GLUCOSE BLDC GLUCOMTR-MCNC: 133 MG/DL (ref 70–130)
GLUCOSE BLDC GLUCOMTR-MCNC: 135 MG/DL (ref 70–130)
GLUCOSE BLDC GLUCOMTR-MCNC: 136 MG/DL (ref 70–130)
GLUCOSE BLDC GLUCOMTR-MCNC: 142 MG/DL (ref 70–130)
GLUCOSE BLDC GLUCOMTR-MCNC: 145 MG/DL (ref 70–130)
GLUCOSE BLDC GLUCOMTR-MCNC: 147 MG/DL (ref 70–130)
GLUCOSE BLDC GLUCOMTR-MCNC: 151 MG/DL (ref 70–130)
GLUCOSE BLDC GLUCOMTR-MCNC: 152 MG/DL (ref 70–130)
GLUCOSE BLDC GLUCOMTR-MCNC: 153 MG/DL (ref 70–130)
GLUCOSE BLDC GLUCOMTR-MCNC: 160 MG/DL (ref 70–130)
GLUCOSE BLDC GLUCOMTR-MCNC: 163 MG/DL (ref 70–130)
GLUCOSE BLDC GLUCOMTR-MCNC: 165 MG/DL (ref 70–130)
GLUCOSE BLDC GLUCOMTR-MCNC: 167 MG/DL (ref 70–130)
GLUCOSE BLDC GLUCOMTR-MCNC: 172 MG/DL (ref 70–130)
GLUCOSE BLDC GLUCOMTR-MCNC: 178 MG/DL (ref 70–130)
GLUCOSE BLDC GLUCOMTR-MCNC: 86 MG/DL (ref 70–130)
GLUCOSE BLDC GLUCOMTR-MCNC: 92 MG/DL (ref 70–130)
GLUCOSE BLDC GLUCOMTR-MCNC: 96 MG/DL (ref 70–130)
GLUCOSE SERPL-MCNC: 102 MG/DL (ref 65–99)
GLUCOSE UR STRIP-MCNC: NEGATIVE MG/DL
HADV DNA SPEC NAA+PROBE: NOT DETECTED
HBA1C MFR BLD: 5.6 % (ref 4.8–5.6)
HCO3 BLDA-SCNC: 37.5 MMOL/L (ref 22–28)
HCO3 BLDA-SCNC: 38.6 MMOL/L (ref 22–28)
HCO3 BLDA-SCNC: 40 MMOL/L (ref 22–28)
HCO3 BLDA-SCNC: 40.7 MMOL/L (ref 22–28)
HCO3 BLDA-SCNC: 40.7 MMOL/L (ref 22–28)
HCO3 BLDA-SCNC: 41.2 MMOL/L (ref 22–28)
HCO3 BLDA-SCNC: 42.1 MMOL/L (ref 22–28)
HCO3 BLDA-SCNC: 44.8 MMOL/L (ref 22–28)
HCO3 BLDA-SCNC: 45 MMOL/L (ref 22–28)
HCOV 229E RNA SPEC QL NAA+PROBE: NOT DETECTED
HCOV HKU1 RNA SPEC QL NAA+PROBE: NOT DETECTED
HCOV NL63 RNA SPEC QL NAA+PROBE: NOT DETECTED
HCOV OC43 RNA SPEC QL NAA+PROBE: NOT DETECTED
HCT VFR BLD AUTO: 33 % (ref 40.4–52.2)
HCT VFR BLD AUTO: 33.2 % (ref 40.4–52.2)
HCT VFR BLD AUTO: 33.3 % (ref 40.4–52.2)
HCT VFR BLD AUTO: 34.7 % (ref 40.4–52.2)
HCT VFR BLD AUTO: 34.7 % (ref 40.4–52.2)
HCT VFR BLD AUTO: 35.9 % (ref 40.4–52.2)
HCT VFR BLD AUTO: 38.4 % (ref 40.4–52.2)
HGB BLD-MCNC: 10.2 G/DL (ref 13.7–17.6)
HGB BLD-MCNC: 10.5 G/DL (ref 13.7–17.6)
HGB BLD-MCNC: 11.4 G/DL (ref 13.7–17.6)
HGB BLD-MCNC: 9.8 G/DL (ref 13.7–17.6)
HGB BLD-MCNC: 9.9 G/DL (ref 13.7–17.6)
HGB UR QL STRIP.AUTO: NEGATIVE
HMPV RNA NPH QL NAA+NON-PROBE: NOT DETECTED
HOROWITZ INDEX BLD+IHG-RTO: 30 %
HOROWITZ INDEX BLD+IHG-RTO: 30 %
HOROWITZ INDEX BLD+IHG-RTO: 40 %
HPIV1 RNA SPEC QL NAA+PROBE: NOT DETECTED
HPIV2 RNA SPEC QL NAA+PROBE: NOT DETECTED
HPIV3 RNA NPH QL NAA+PROBE: NOT DETECTED
HPIV4 P GENE NPH QL NAA+PROBE: NOT DETECTED
HYALINE CASTS UR QL AUTO: NORMAL /LPF
IMM GRANULOCYTES # BLD AUTO: 0.01 10*3/MM3 (ref 0–0.03)
IMM GRANULOCYTES # BLD AUTO: 0.03 10*3/MM3 (ref 0–0.03)
IMM GRANULOCYTES # BLD AUTO: 0.04 10*3/MM3 (ref 0–0.03)
IMM GRANULOCYTES # BLD AUTO: 0.04 10*3/MM3 (ref 0–0.03)
IMM GRANULOCYTES # BLD AUTO: 0.12 10*3/MM3 (ref 0–0.03)
IMM GRANULOCYTES NFR BLD AUTO: 0.1 % (ref 0–0.5)
IMM GRANULOCYTES NFR BLD AUTO: 0.2 % (ref 0–0.5)
IMM GRANULOCYTES NFR BLD AUTO: 0.4 % (ref 0–0.5)
IMM GRANULOCYTES NFR BLD AUTO: 0.5 % (ref 0–0.5)
IMM GRANULOCYTES NFR BLD AUTO: 1 % (ref 0–0.5)
INR PPP: 1.3 (ref 0.9–1.1)
KETONES UR QL STRIP: ABNORMAL
LEUKOCYTE ESTERASE UR QL STRIP.AUTO: NEGATIVE
LYMPHOCYTES # BLD AUTO: 0.94 10*3/MM3 (ref 0.9–4.8)
LYMPHOCYTES # BLD AUTO: 1.98 10*3/MM3 (ref 0.9–4.8)
LYMPHOCYTES # BLD AUTO: 2.56 10*3/MM3 (ref 0.9–4.8)
LYMPHOCYTES # BLD AUTO: 2.6 10*3/MM3 (ref 0.9–4.8)
LYMPHOCYTES # BLD AUTO: 3.13 10*3/MM3 (ref 0.9–4.8)
LYMPHOCYTES NFR BLD AUTO: 10.8 % (ref 19.6–45.3)
LYMPHOCYTES NFR BLD AUTO: 21.8 % (ref 19.6–45.3)
LYMPHOCYTES NFR BLD AUTO: 22.9 % (ref 19.6–45.3)
LYMPHOCYTES NFR BLD AUTO: 24.1 % (ref 19.6–45.3)
LYMPHOCYTES NFR BLD AUTO: 24.9 % (ref 19.6–45.3)
M PNEUMO IGG SER IA-ACNC: NOT DETECTED
MCH RBC QN AUTO: 28.3 PG (ref 27–32.7)
MCH RBC QN AUTO: 29.6 PG (ref 27–32.7)
MCH RBC QN AUTO: 29.7 PG (ref 27–32.7)
MCH RBC QN AUTO: 30 PG (ref 27–32.7)
MCH RBC QN AUTO: 30.1 PG (ref 27–32.7)
MCH RBC QN AUTO: 30.2 PG (ref 27–32.7)
MCH RBC QN AUTO: 30.4 PG (ref 27–32.7)
MCHC RBC AUTO-ENTMCNC: 27.6 G/DL (ref 32.6–36.4)
MCHC RBC AUTO-ENTMCNC: 29.4 G/DL (ref 32.6–36.4)
MCHC RBC AUTO-ENTMCNC: 29.7 G/DL (ref 32.6–36.4)
MCHC RBC AUTO-ENTMCNC: 29.8 G/DL (ref 32.6–36.4)
MCHC RBC AUTO-ENTMCNC: 30.3 G/DL (ref 32.6–36.4)
MCV RBC AUTO: 101.2 FL (ref 79.8–96.2)
MCV RBC AUTO: 101.5 FL (ref 79.8–96.2)
MCV RBC AUTO: 102.1 FL (ref 79.8–96.2)
MCV RBC AUTO: 102.4 FL (ref 79.8–96.2)
MCV RBC AUTO: 102.6 FL (ref 79.8–96.2)
MCV RBC AUTO: 98.3 FL (ref 79.8–96.2)
MCV RBC AUTO: 99.4 FL (ref 79.8–96.2)
MODALITY: ABNORMAL
MONOCYTES # BLD AUTO: 0.3 10*3/MM3 (ref 0.2–1.2)
MONOCYTES # BLD AUTO: 0.75 10*3/MM3 (ref 0.2–1.2)
MONOCYTES # BLD AUTO: 0.8 10*3/MM3 (ref 0.2–1.2)
MONOCYTES # BLD AUTO: 1.34 10*3/MM3 (ref 0.2–1.2)
MONOCYTES # BLD AUTO: 1.43 10*3/MM3 (ref 0.2–1.2)
MONOCYTES NFR BLD AUTO: 10.6 % (ref 5–12)
MONOCYTES NFR BLD AUTO: 12 % (ref 5–12)
MONOCYTES NFR BLD AUTO: 3.4 % (ref 5–12)
MONOCYTES NFR BLD AUTO: 6.7 % (ref 5–12)
MONOCYTES NFR BLD AUTO: 9.8 % (ref 5–12)
NEUTROPHILS # BLD AUTO: 5.4 10*3/MM3 (ref 1.9–8.1)
NEUTROPHILS # BLD AUTO: 7.46 10*3/MM3 (ref 1.9–8.1)
NEUTROPHILS # BLD AUTO: 7.77 10*3/MM3 (ref 1.9–8.1)
NEUTROPHILS # BLD AUTO: 7.83 10*3/MM3 (ref 1.9–8.1)
NEUTROPHILS # BLD AUTO: 7.97 10*3/MM3 (ref 1.9–8.1)
NEUTROPHILS NFR BLD AUTO: 63.3 % (ref 42.7–76)
NEUTROPHILS NFR BLD AUTO: 65.6 % (ref 42.7–76)
NEUTROPHILS NFR BLD AUTO: 65.9 % (ref 42.7–76)
NEUTROPHILS NFR BLD AUTO: 69.3 % (ref 42.7–76)
NEUTROPHILS NFR BLD AUTO: 85.5 % (ref 42.7–76)
NITRITE UR QL STRIP: NEGATIVE
NT-PROBNP SERPL-MCNC: 2844 PG/ML (ref 0–900)
NT-PROBNP SERPL-MCNC: 5516 PG/ML (ref 5–900)
NT-PROBNP SERPL-MCNC: 5650 PG/ML (ref 0–900)
NT-PROBNP SERPL-MCNC: 7194 PG/ML (ref 5–900)
O2 A-A PPRESDIFF RESPIRATORY: 0.3 MMHG
O2 A-A PPRESDIFF RESPIRATORY: 0.4 MMHG
O2 A-A PPRESDIFF RESPIRATORY: 0.4 MMHG
O2 A-A PPRESDIFF RESPIRATORY: 0.5 MMHG
O2 A-A PPRESDIFF RESPIRATORY: 0.6 MMHG
PCO2 BLDA: 143.5 MM HG (ref 35–45)
PCO2 BLDA: 49.2 MM HG (ref 35–45)
PCO2 BLDA: 62.2 MM HG (ref 35–45)
PCO2 BLDA: 69.9 MM HG (ref 35–45)
PCO2 BLDA: 70.2 MM HG (ref 35–45)
PCO2 BLDA: 81.2 MM HG (ref 35–45)
PCO2 BLDA: 86.6 MM HG (ref 35–45)
PCO2 BLDA: 93.6 MM HG (ref 35–45)
PCO2 BLDA: 98.2 MM HG (ref 35–45)
PEEP RESPIRATORY: 6 CM[H2O]
PH BLDA: 7.07 PH UNITS (ref 7.35–7.45)
PH BLDA: 7.26 PH UNITS (ref 7.35–7.45)
PH BLDA: 7.27 PH UNITS (ref 7.35–7.45)
PH BLDA: 7.3 PH UNITS (ref 7.35–7.45)
PH BLDA: 7.32 PH UNITS (ref 7.35–7.45)
PH BLDA: 7.37 PH UNITS (ref 7.35–7.45)
PH BLDA: 7.37 PH UNITS (ref 7.35–7.45)
PH BLDA: 7.39 PH UNITS (ref 7.35–7.45)
PH BLDA: 7.5 PH UNITS (ref 7.35–7.45)
PH UR STRIP.AUTO: 5.5 [PH] (ref 5–8)
PLATELET # BLD AUTO: 193 10*3/MM3 (ref 140–500)
PLATELET # BLD AUTO: 236 10*3/MM3 (ref 140–500)
PLATELET # BLD AUTO: 253 10*3/MM3 (ref 140–500)
PLATELET # BLD AUTO: 280 10*3/MM3 (ref 140–500)
PLATELET # BLD AUTO: 307 10*3/MM3 (ref 140–500)
PLATELET # BLD AUTO: 359 10*3/MM3 (ref 140–500)
PLATELET # BLD AUTO: 419 10*3/MM3 (ref 140–500)
PMV BLD AUTO: 10.2 FL (ref 6–12)
PMV BLD AUTO: 10.3 FL (ref 6–12)
PMV BLD AUTO: 8.8 FL (ref 6–12)
PMV BLD AUTO: 8.9 FL (ref 6–12)
PMV BLD AUTO: 9 FL (ref 6–12)
PMV BLD AUTO: 9.9 FL (ref 6–12)
PO2 BLDA: 144.6 MM HG (ref 80–100)
PO2 BLDA: 57.3 MM HG (ref 80–100)
PO2 BLDA: 61.9 MM HG (ref 80–100)
PO2 BLDA: 64.1 MM HG (ref 80–100)
PO2 BLDA: 69.5 MM HG (ref 80–100)
PO2 BLDA: 78.6 MM HG (ref 80–100)
PO2 BLDA: 79.2 MM HG (ref 80–100)
PO2 BLDA: 81.2 MM HG (ref 80–100)
PO2 BLDA: 86.2 MM HG (ref 80–100)
POTASSIUM BLD-SCNC: 4 MMOL/L (ref 3.5–5.2)
POTASSIUM BLD-SCNC: 4.2 MMOL/L (ref 3.5–5.2)
POTASSIUM BLD-SCNC: 4.4 MMOL/L (ref 3.5–5.2)
POTASSIUM BLD-SCNC: 4.6 MMOL/L (ref 3.5–5.2)
POTASSIUM BLD-SCNC: 4.8 MMOL/L (ref 3.5–5.2)
POTASSIUM BLD-SCNC: 4.9 MMOL/L (ref 3.5–5.2)
POTASSIUM BLD-SCNC: 5.3 MMOL/L (ref 3.5–5.2)
POTASSIUM BLD-SCNC: 5.8 MMOL/L (ref 3.5–5.2)
POTASSIUM BLD-SCNC: 5.9 MMOL/L (ref 3.5–5.2)
POTASSIUM SERPL-SCNC: 5 MMOL/L (ref 3.5–5.2)
PROT SERPL-MCNC: 6.6 G/DL (ref 6–8.5)
PROT SERPL-MCNC: 7.7 G/DL (ref 6–8.5)
PROT SERPL-MCNC: 7.7 G/DL (ref 6–8.5)
PROT SERPL-MCNC: 7.8 G/DL (ref 6–8.5)
PROT UR QL STRIP: ABNORMAL
PROTHROMBIN TIME: 16 SECONDS (ref 11.7–14.2)
RBC # BLD AUTO: 3.26 10*6/MM3 (ref 4.6–6)
RBC # BLD AUTO: 3.28 10*6/MM3 (ref 4.6–6)
RBC # BLD AUTO: 3.34 10*6/MM3 (ref 4.6–6)
RBC # BLD AUTO: 3.4 10*6/MM3 (ref 4.6–6)
RBC # BLD AUTO: 3.5 10*6/MM3 (ref 4.6–6)
RBC # BLD AUTO: 3.53 10*6/MM3 (ref 4.6–6)
RBC # BLD AUTO: 3.75 10*6/MM3 (ref 4.6–6)
RBC # UR: NORMAL /HPF
REF LAB TEST METHOD: NORMAL
RHINOVIRUS RNA SPEC NAA+PROBE: NOT DETECTED
RSV RNA NPH QL NAA+NON-PROBE: NOT DETECTED
SAO2 % BLDCOA: 82.3 % (ref 92–99)
SAO2 % BLDCOA: 90.1 % (ref 92–99)
SAO2 % BLDCOA: 90.3 % (ref 92–99)
SAO2 % BLDCOA: 90.6 % (ref 92–99)
SAO2 % BLDCOA: 92.1 % (ref 92–99)
SAO2 % BLDCOA: 94.5 % (ref 92–99)
SAO2 % BLDCOA: 94.6 % (ref 92–99)
SAO2 % BLDCOA: 96.7 % (ref 92–99)
SAO2 % BLDCOA: 97.4 % (ref 92–99)
SET MECH RESP RATE: 10
SET MECH RESP RATE: 18
SET MECH RESP RATE: 20
SET MECH RESP RATE: 24
SODIUM BLD-SCNC: 138 MMOL/L (ref 136–145)
SODIUM BLD-SCNC: 138 MMOL/L (ref 136–145)
SODIUM BLD-SCNC: 139 MMOL/L (ref 136–145)
SODIUM BLD-SCNC: 139 MMOL/L (ref 136–145)
SODIUM BLD-SCNC: 140 MMOL/L (ref 136–145)
SODIUM BLD-SCNC: 140 MMOL/L (ref 136–145)
SODIUM BLD-SCNC: 141 MMOL/L (ref 136–145)
SODIUM BLD-SCNC: 142 MMOL/L (ref 136–145)
SODIUM BLD-SCNC: 142 MMOL/L (ref 136–145)
SODIUM BLD-SCNC: 143 MMOL/L (ref 136–145)
SODIUM BLD-SCNC: 143 MMOL/L (ref 136–145)
SODIUM SERPL-SCNC: 141 MMOL/L (ref 136–145)
SP GR UR STRIP: 1.02 (ref 1–1.03)
SQUAMOUS #/AREA URNS HPF: NORMAL /HPF
TOTAL RATE: 12 BREATHS/MINUTE
TOTAL RATE: 18 BREATHS/MINUTE
TOTAL RATE: 19 BREATHS/MINUTE
TOTAL RATE: 24 BREATHS/MINUTE
TOTAL RATE: 24 BREATHS/MINUTE
TROPONIN T SERPL-MCNC: 0.05 NG/ML (ref 0–0.03)
TROPONIN T SERPL-MCNC: 0.17 NG/ML (ref 0–0.03)
TROPONIN T SERPL-MCNC: 0.18 NG/ML (ref 0–0.03)
UROBILINOGEN UR QL STRIP: ABNORMAL
VENTILATOR MODE: ABNORMAL
VENTILATOR MODE: ABNORMAL
VT ON VENT VENT: 401 ML
VT ON VENT VENT: 430 ML
VT ON VENT VENT: 500 ML
VT ON VENT VENT: 500 ML
WBC # BLD AUTO: 11.2 10*3/MM3 (ref 4.5–10.7)
WBC NRBC COR # BLD: 11.93 10*3/MM3 (ref 4.5–10.7)
WBC NRBC COR # BLD: 12.59 10*3/MM3 (ref 4.5–10.7)
WBC NRBC COR # BLD: 8.2 10*3/MM3 (ref 4.5–10.7)
WBC NRBC COR # BLD: 8.55 10*3/MM3 (ref 4.5–10.7)
WBC NRBC COR # BLD: 8.73 10*3/MM3 (ref 4.5–10.7)
WBC NRBC COR # BLD: 8.76 10*3/MM3 (ref 4.5–10.7)
WBC UR QL AUTO: NORMAL /HPF

## 2019-01-01 PROCEDURE — 82803 BLOOD GASES ANY COMBINATION: CPT

## 2019-01-01 PROCEDURE — 94799 UNLISTED PULMONARY SVC/PX: CPT

## 2019-01-01 PROCEDURE — 94660 CPAP INITIATION&MGMT: CPT

## 2019-01-01 PROCEDURE — 82962 GLUCOSE BLOOD TEST: CPT

## 2019-01-01 PROCEDURE — 99214 OFFICE O/P EST MOD 30 MIN: CPT | Performed by: FAMILY MEDICINE

## 2019-01-01 PROCEDURE — 83880 ASSAY OF NATRIURETIC PEPTIDE: CPT | Performed by: INTERNAL MEDICINE

## 2019-01-01 PROCEDURE — 70450 CT HEAD/BRAIN W/O DYE: CPT

## 2019-01-01 PROCEDURE — 25010000002 ENOXAPARIN PER 10 MG: Performed by: INTERNAL MEDICINE

## 2019-01-01 PROCEDURE — 97110 THERAPEUTIC EXERCISES: CPT

## 2019-01-01 PROCEDURE — 94640 AIRWAY INHALATION TREATMENT: CPT

## 2019-01-01 PROCEDURE — 93010 ELECTROCARDIOGRAM REPORT: CPT | Performed by: INTERNAL MEDICINE

## 2019-01-01 PROCEDURE — 25010000002 METHYLPREDNISOLONE PER 40 MG: Performed by: INTERNAL MEDICINE

## 2019-01-01 PROCEDURE — 93000 ELECTROCARDIOGRAM COMPLETE: CPT | Performed by: NURSE PRACTITIONER

## 2019-01-01 PROCEDURE — 99285 EMERGENCY DEPT VISIT HI MDM: CPT

## 2019-01-01 PROCEDURE — 25010000002 HEPARIN (PORCINE) PER 1000 UNITS: Performed by: INTERNAL MEDICINE

## 2019-01-01 PROCEDURE — 80048 BASIC METABOLIC PNL TOTAL CA: CPT | Performed by: INTERNAL MEDICINE

## 2019-01-01 PROCEDURE — 80053 COMPREHEN METABOLIC PANEL: CPT | Performed by: INTERNAL MEDICINE

## 2019-01-01 PROCEDURE — 99214 OFFICE O/P EST MOD 30 MIN: CPT | Performed by: NURSE PRACTITIONER

## 2019-01-01 PROCEDURE — 80053 COMPREHEN METABOLIC PANEL: CPT | Performed by: EMERGENCY MEDICINE

## 2019-01-01 PROCEDURE — 93005 ELECTROCARDIOGRAM TRACING: CPT | Performed by: EMERGENCY MEDICINE

## 2019-01-01 PROCEDURE — 97166 OT EVAL MOD COMPLEX 45 MIN: CPT | Performed by: OCCUPATIONAL THERAPIST

## 2019-01-01 PROCEDURE — 36600 WITHDRAWAL OF ARTERIAL BLOOD: CPT

## 2019-01-01 PROCEDURE — 92526 ORAL FUNCTION THERAPY: CPT

## 2019-01-01 PROCEDURE — 85730 THROMBOPLASTIN TIME PARTIAL: CPT | Performed by: EMERGENCY MEDICINE

## 2019-01-01 PROCEDURE — 99238 HOSP IP/OBS DSCHRG MGMT 30/<: CPT | Performed by: INTERNAL MEDICINE

## 2019-01-01 PROCEDURE — 97535 SELF CARE MNGMENT TRAINING: CPT | Performed by: OCCUPATIONAL THERAPIST

## 2019-01-01 PROCEDURE — 63710000001 INSULIN LISPRO (HUMAN) PER 5 UNITS: Performed by: INTERNAL MEDICINE

## 2019-01-01 PROCEDURE — 99223 1ST HOSP IP/OBS HIGH 75: CPT | Performed by: INTERNAL MEDICINE

## 2019-01-01 PROCEDURE — 84484 ASSAY OF TROPONIN QUANT: CPT | Performed by: EMERGENCY MEDICINE

## 2019-01-01 PROCEDURE — 85025 COMPLETE CBC W/AUTO DIFF WBC: CPT | Performed by: EMERGENCY MEDICINE

## 2019-01-01 PROCEDURE — 85027 COMPLETE CBC AUTOMATED: CPT | Performed by: INTERNAL MEDICINE

## 2019-01-01 PROCEDURE — 83880 ASSAY OF NATRIURETIC PEPTIDE: CPT | Performed by: EMERGENCY MEDICINE

## 2019-01-01 PROCEDURE — 93005 ELECTROCARDIOGRAM TRACING: CPT | Performed by: INTERNAL MEDICINE

## 2019-01-01 PROCEDURE — 25010000002 HYDRALAZINE PER 20 MG

## 2019-01-01 PROCEDURE — 99233 SBSQ HOSP IP/OBS HIGH 50: CPT | Performed by: INTERNAL MEDICINE

## 2019-01-01 PROCEDURE — 87633 RESP VIRUS 12-25 TARGETS: CPT | Performed by: INTERNAL MEDICINE

## 2019-01-01 PROCEDURE — 81001 URINALYSIS AUTO W/SCOPE: CPT | Performed by: EMERGENCY MEDICINE

## 2019-01-01 PROCEDURE — 87798 DETECT AGENT NOS DNA AMP: CPT | Performed by: INTERNAL MEDICINE

## 2019-01-01 PROCEDURE — 63710000001 INSULIN REGULAR HUMAN PER 5 UNITS: Performed by: INTERNAL MEDICINE

## 2019-01-01 PROCEDURE — 25010000002 FUROSEMIDE PER 20 MG

## 2019-01-01 PROCEDURE — 71046 X-RAY EXAM CHEST 2 VIEWS: CPT

## 2019-01-01 PROCEDURE — P9612 CATHETERIZE FOR URINE SPEC: HCPCS

## 2019-01-01 PROCEDURE — 25010000002 METHYLPREDNISOLONE PER 125 MG: Performed by: INTERNAL MEDICINE

## 2019-01-01 PROCEDURE — 87581 M.PNEUMON DNA AMP PROBE: CPT | Performed by: INTERNAL MEDICINE

## 2019-01-01 PROCEDURE — 71045 X-RAY EXAM CHEST 1 VIEW: CPT

## 2019-01-01 PROCEDURE — 85025 COMPLETE CBC W/AUTO DIFF WBC: CPT | Performed by: INTERNAL MEDICINE

## 2019-01-01 PROCEDURE — 85610 PROTHROMBIN TIME: CPT | Performed by: EMERGENCY MEDICINE

## 2019-01-01 PROCEDURE — 63710000001 PREDNISONE PER 1 MG: Performed by: INTERNAL MEDICINE

## 2019-01-01 PROCEDURE — 25010000002 HYDRALAZINE PER 20 MG: Performed by: INTERNAL MEDICINE

## 2019-01-01 PROCEDURE — 97161 PT EVAL LOW COMPLEX 20 MIN: CPT

## 2019-01-01 PROCEDURE — 82140 ASSAY OF AMMONIA: CPT | Performed by: INTERNAL MEDICINE

## 2019-01-01 PROCEDURE — 83605 ASSAY OF LACTIC ACID: CPT | Performed by: EMERGENCY MEDICINE

## 2019-01-01 PROCEDURE — 25010000002 FUROSEMIDE PER 20 MG: Performed by: INTERNAL MEDICINE

## 2019-01-01 PROCEDURE — 85007 BL SMEAR W/DIFF WBC COUNT: CPT | Performed by: INTERNAL MEDICINE

## 2019-01-01 PROCEDURE — 84484 ASSAY OF TROPONIN QUANT: CPT | Performed by: INTERNAL MEDICINE

## 2019-01-01 PROCEDURE — 87486 CHLMYD PNEUM DNA AMP PROBE: CPT | Performed by: INTERNAL MEDICINE

## 2019-01-01 RX ORDER — IPRATROPIUM BROMIDE AND ALBUTEROL SULFATE 2.5; .5 MG/3ML; MG/3ML
3 SOLUTION RESPIRATORY (INHALATION)
Status: DISCONTINUED | OUTPATIENT
Start: 2019-01-01 | End: 2019-01-01 | Stop reason: HOSPADM

## 2019-01-01 RX ORDER — AMIODARONE HYDROCHLORIDE 200 MG/1
200 TABLET ORAL 2 TIMES DAILY
COMMUNITY
End: 2019-01-01 | Stop reason: HOSPADM

## 2019-01-01 RX ORDER — ONDANSETRON 4 MG/1
4 TABLET, FILM COATED ORAL EVERY 6 HOURS PRN
Status: DISCONTINUED | OUTPATIENT
Start: 2019-01-01 | End: 2019-01-01 | Stop reason: HOSPADM

## 2019-01-01 RX ORDER — ACETAMINOPHEN 325 MG/1
650 TABLET ORAL EVERY 4 HOURS PRN
Status: DISCONTINUED | OUTPATIENT
Start: 2019-01-01 | End: 2019-01-01 | Stop reason: HOSPADM

## 2019-01-01 RX ORDER — HYDRALAZINE HYDROCHLORIDE 20 MG/ML
INJECTION INTRAMUSCULAR; INTRAVENOUS
Status: COMPLETED
Start: 2019-01-01 | End: 2019-01-01

## 2019-01-01 RX ORDER — CARVEDILOL 6.25 MG/1
12.5 TABLET ORAL EVERY 12 HOURS
Start: 2019-01-01 | End: 2019-01-01 | Stop reason: SDUPTHER

## 2019-01-01 RX ORDER — POTASSIUM CHLORIDE 20 MEQ/1
20 TABLET, EXTENDED RELEASE ORAL DAILY
Qty: 30 TABLET | Refills: 11 | Status: SHIPPED | OUTPATIENT
Start: 2019-01-01

## 2019-01-01 RX ORDER — CARVEDILOL 6.25 MG/1
12.5 TABLET ORAL EVERY 12 HOURS
Status: ON HOLD
Start: 2019-01-01 | End: 2019-01-01 | Stop reason: SDUPTHER

## 2019-01-01 RX ORDER — CARVEDILOL 25 MG/1
25 TABLET ORAL EVERY 12 HOURS
Status: DISCONTINUED | OUTPATIENT
Start: 2019-01-01 | End: 2019-01-01 | Stop reason: HOSPADM

## 2019-01-01 RX ORDER — AMLODIPINE BESYLATE 10 MG/1
10 TABLET ORAL
Status: DISCONTINUED | OUTPATIENT
Start: 2019-01-01 | End: 2019-01-01 | Stop reason: HOSPADM

## 2019-01-01 RX ORDER — FUROSEMIDE 40 MG/1
40 TABLET ORAL DAILY
Status: DISCONTINUED | OUTPATIENT
Start: 2019-01-01 | End: 2019-01-01 | Stop reason: HOSPADM

## 2019-01-01 RX ORDER — HYDRALAZINE HYDROCHLORIDE 20 MG/ML
10 INJECTION INTRAMUSCULAR; INTRAVENOUS ONCE
Status: COMPLETED | OUTPATIENT
Start: 2019-01-01 | End: 2019-01-01

## 2019-01-01 RX ORDER — HYDRALAZINE HYDROCHLORIDE 20 MG/ML
20 INJECTION INTRAMUSCULAR; INTRAVENOUS ONCE
Status: COMPLETED | OUTPATIENT
Start: 2019-01-01 | End: 2019-01-01

## 2019-01-01 RX ORDER — METHYLPREDNISOLONE SODIUM SUCCINATE 125 MG/2ML
125 INJECTION, POWDER, LYOPHILIZED, FOR SOLUTION INTRAMUSCULAR; INTRAVENOUS ONCE
Status: COMPLETED | OUTPATIENT
Start: 2019-01-01 | End: 2019-01-01

## 2019-01-01 RX ORDER — NICOTINE POLACRILEX 4 MG
15 LOZENGE BUCCAL
Status: DISCONTINUED | OUTPATIENT
Start: 2019-01-01 | End: 2019-01-01 | Stop reason: HOSPADM

## 2019-01-01 RX ORDER — AMLODIPINE BESYLATE 10 MG/1
10 TABLET ORAL
Qty: 30 TABLET | Refills: 0 | Status: SHIPPED | OUTPATIENT
Start: 2019-01-01

## 2019-01-01 RX ORDER — IPRATROPIUM BROMIDE AND ALBUTEROL SULFATE 2.5; .5 MG/3ML; MG/3ML
3 SOLUTION RESPIRATORY (INHALATION) EVERY 4 HOURS PRN
Status: DISCONTINUED | OUTPATIENT
Start: 2019-01-01 | End: 2019-01-01 | Stop reason: HOSPADM

## 2019-01-01 RX ORDER — SODIUM CHLORIDE 9 MG/ML
100 INJECTION, SOLUTION INTRAVENOUS CONTINUOUS
Status: DISCONTINUED | OUTPATIENT
Start: 2019-01-01 | End: 2019-01-01

## 2019-01-01 RX ORDER — NICOTINE 21 MG/24HR
1 PATCH, TRANSDERMAL 24 HOURS TRANSDERMAL
Status: DISCONTINUED | OUTPATIENT
Start: 2019-01-01 | End: 2019-01-01 | Stop reason: HOSPADM

## 2019-01-01 RX ORDER — FUROSEMIDE 10 MG/ML
40 INJECTION INTRAMUSCULAR; INTRAVENOUS DAILY
Status: DISCONTINUED | OUTPATIENT
Start: 2019-01-01 | End: 2019-01-01

## 2019-01-01 RX ORDER — LISINOPRIL 10 MG/1
10 TABLET ORAL
Status: DISCONTINUED | OUTPATIENT
Start: 2019-01-01 | End: 2019-01-01 | Stop reason: HOSPADM

## 2019-01-01 RX ORDER — ZOLPIDEM TARTRATE 10 MG/1
10 TABLET ORAL NIGHTLY PRN
COMMUNITY

## 2019-01-01 RX ORDER — CARVEDILOL 12.5 MG/1
12.5 TABLET ORAL EVERY 12 HOURS
Status: DISCONTINUED | OUTPATIENT
Start: 2019-01-01 | End: 2019-01-01

## 2019-01-01 RX ORDER — METHYLPREDNISOLONE SODIUM SUCCINATE 40 MG/ML
40 INJECTION, POWDER, LYOPHILIZED, FOR SOLUTION INTRAMUSCULAR; INTRAVENOUS EVERY 12 HOURS
Status: DISCONTINUED | OUTPATIENT
Start: 2019-01-01 | End: 2019-01-01

## 2019-01-01 RX ORDER — PREDNISONE 10 MG/1
TABLET ORAL
Qty: 31 TABLET | Refills: 0 | Status: SHIPPED | OUTPATIENT
Start: 2019-01-01 | End: 2019-01-01 | Stop reason: HOSPADM

## 2019-01-01 RX ORDER — DEXTROSE MONOHYDRATE 25 G/50ML
25 INJECTION, SOLUTION INTRAVENOUS ONCE
Status: COMPLETED | OUTPATIENT
Start: 2019-01-01 | End: 2019-01-01

## 2019-01-01 RX ORDER — QUETIAPINE FUMARATE 25 MG/1
25 TABLET, FILM COATED ORAL NIGHTLY
Status: DISCONTINUED | OUTPATIENT
Start: 2019-01-01 | End: 2019-01-01 | Stop reason: HOSPADM

## 2019-01-01 RX ORDER — LOSARTAN POTASSIUM 50 MG/1
50 TABLET ORAL DAILY
Status: DISCONTINUED | OUTPATIENT
Start: 2019-01-01 | End: 2019-01-01 | Stop reason: HOSPADM

## 2019-01-01 RX ORDER — FUROSEMIDE 10 MG/ML
INJECTION INTRAMUSCULAR; INTRAVENOUS
Status: COMPLETED
Start: 2019-01-01 | End: 2019-01-01

## 2019-01-01 RX ORDER — TAMSULOSIN HYDROCHLORIDE 0.4 MG/1
0.4 CAPSULE ORAL DAILY
Status: DISCONTINUED | OUTPATIENT
Start: 2019-01-01 | End: 2019-01-01 | Stop reason: HOSPADM

## 2019-01-01 RX ORDER — IPRATROPIUM BROMIDE AND ALBUTEROL SULFATE 2.5; .5 MG/3ML; MG/3ML
3 SOLUTION RESPIRATORY (INHALATION) EVERY 6 HOURS PRN
Status: DISCONTINUED | OUTPATIENT
Start: 2019-01-01 | End: 2019-01-01

## 2019-01-01 RX ORDER — FAMOTIDINE 20 MG/1
20 TABLET, FILM COATED ORAL 2 TIMES DAILY
Qty: 60 TABLET | Refills: 0 | Status: SHIPPED | OUTPATIENT
Start: 2019-01-01

## 2019-01-01 RX ORDER — IPRATROPIUM BROMIDE AND ALBUTEROL SULFATE 2.5; .5 MG/3ML; MG/3ML
3 SOLUTION RESPIRATORY (INHALATION)
Status: DISCONTINUED | OUTPATIENT
Start: 2019-01-01 | End: 2019-01-01

## 2019-01-01 RX ORDER — SODIUM CHLORIDE 0.9 % (FLUSH) 0.9 %
10 SYRINGE (ML) INJECTION AS NEEDED
Status: DISCONTINUED | OUTPATIENT
Start: 2019-01-01 | End: 2019-01-01 | Stop reason: HOSPADM

## 2019-01-01 RX ORDER — METHYLPREDNISOLONE SODIUM SUCCINATE 40 MG/ML
40 INJECTION, POWDER, LYOPHILIZED, FOR SOLUTION INTRAMUSCULAR; INTRAVENOUS EVERY 8 HOURS
Status: DISCONTINUED | OUTPATIENT
Start: 2019-01-01 | End: 2019-01-01

## 2019-01-01 RX ORDER — ATORVASTATIN CALCIUM 20 MG/1
40 TABLET, FILM COATED ORAL NIGHTLY
Status: DISCONTINUED | OUTPATIENT
Start: 2019-01-01 | End: 2019-01-01 | Stop reason: HOSPADM

## 2019-01-01 RX ORDER — ALUMINA, MAGNESIA, AND SIMETHICONE 2400; 2400; 240 MG/30ML; MG/30ML; MG/30ML
15 SUSPENSION ORAL EVERY 6 HOURS PRN
Status: DISCONTINUED | OUTPATIENT
Start: 2019-01-01 | End: 2019-01-01 | Stop reason: HOSPADM

## 2019-01-01 RX ORDER — OMEGA-3S/DHA/EPA/FISH OIL/D3 300MG-1000
400 CAPSULE ORAL DAILY
COMMUNITY

## 2019-01-01 RX ORDER — ASPIRIN 81 MG/1
81 TABLET ORAL DAILY
Status: DISCONTINUED | OUTPATIENT
Start: 2019-01-01 | End: 2019-01-01 | Stop reason: HOSPADM

## 2019-01-01 RX ORDER — ONDANSETRON 4 MG/1
4 TABLET, ORALLY DISINTEGRATING ORAL EVERY 6 HOURS PRN
Status: DISCONTINUED | OUTPATIENT
Start: 2019-01-01 | End: 2019-01-01 | Stop reason: HOSPADM

## 2019-01-01 RX ORDER — NICOTINE 21 MG/24HR
1 PATCH, TRANSDERMAL 24 HOURS TRANSDERMAL
Qty: 30 PATCH | Refills: 0 | Status: SHIPPED | OUTPATIENT
Start: 2019-01-01

## 2019-01-01 RX ORDER — DEXTROSE MONOHYDRATE 25 G/50ML
25 INJECTION, SOLUTION INTRAVENOUS
Status: DISCONTINUED | OUTPATIENT
Start: 2019-01-01 | End: 2019-01-01 | Stop reason: HOSPADM

## 2019-01-01 RX ORDER — FUROSEMIDE 10 MG/ML
60 INJECTION INTRAMUSCULAR; INTRAVENOUS ONCE
Status: COMPLETED | OUTPATIENT
Start: 2019-01-01 | End: 2019-01-01

## 2019-01-01 RX ORDER — FUROSEMIDE 80 MG
40 TABLET ORAL DAILY
Qty: 30 TABLET | Refills: 0 | Status: SHIPPED | OUTPATIENT
Start: 2019-01-01 | End: 2019-01-01 | Stop reason: SDUPTHER

## 2019-01-01 RX ORDER — ACETAMINOPHEN 650 MG/1
650 SUPPOSITORY RECTAL EVERY 4 HOURS PRN
Status: DISCONTINUED | OUTPATIENT
Start: 2019-01-01 | End: 2019-01-01 | Stop reason: HOSPADM

## 2019-01-01 RX ORDER — ALBUTEROL SULFATE 2.5 MG/3ML
2.5 SOLUTION RESPIRATORY (INHALATION) 4 TIMES DAILY PRN
Qty: 125 VIAL | Refills: 11 | Status: SHIPPED | OUTPATIENT
Start: 2019-01-01

## 2019-01-01 RX ORDER — FUROSEMIDE 80 MG
40 TABLET ORAL DAILY
Qty: 45 TABLET | Refills: 1 | Status: SHIPPED | OUTPATIENT
Start: 2019-01-01

## 2019-01-01 RX ORDER — LOSARTAN POTASSIUM 50 MG/1
50 TABLET ORAL DAILY
Qty: 30 TABLET | Refills: 3 | Status: SHIPPED | OUTPATIENT
Start: 2019-01-01

## 2019-01-01 RX ORDER — PREDNISONE 20 MG/1
20 TABLET ORAL 2 TIMES DAILY WITH MEALS
Status: DISCONTINUED | OUTPATIENT
Start: 2019-01-01 | End: 2019-01-01 | Stop reason: HOSPADM

## 2019-01-01 RX ORDER — IPRATROPIUM BROMIDE AND ALBUTEROL SULFATE 2.5; .5 MG/3ML; MG/3ML
3 SOLUTION RESPIRATORY (INHALATION) ONCE
Status: COMPLETED | OUTPATIENT
Start: 2019-01-01 | End: 2019-01-01

## 2019-01-01 RX ORDER — HEPARIN SODIUM 5000 [USP'U]/ML
5000 INJECTION, SOLUTION INTRAVENOUS; SUBCUTANEOUS EVERY 8 HOURS SCHEDULED
Status: DISCONTINUED | OUTPATIENT
Start: 2019-01-01 | End: 2019-01-01 | Stop reason: HOSPADM

## 2019-01-01 RX ORDER — PREDNISONE 20 MG/1
40 TABLET ORAL
Status: DISCONTINUED | OUTPATIENT
Start: 2019-01-01 | End: 2019-01-01 | Stop reason: HOSPADM

## 2019-01-01 RX ORDER — ONDANSETRON 2 MG/ML
4 INJECTION INTRAMUSCULAR; INTRAVENOUS EVERY 6 HOURS PRN
Status: DISCONTINUED | OUTPATIENT
Start: 2019-01-01 | End: 2019-01-01 | Stop reason: HOSPADM

## 2019-01-01 RX ORDER — DOCUSATE SODIUM 100 MG/1
100 CAPSULE, LIQUID FILLED ORAL 2 TIMES DAILY
Status: DISCONTINUED | OUTPATIENT
Start: 2019-01-01 | End: 2019-01-01 | Stop reason: HOSPADM

## 2019-01-01 RX ORDER — BISACODYL 10 MG
10 SUPPOSITORY, RECTAL RECTAL DAILY PRN
Status: DISCONTINUED | OUTPATIENT
Start: 2019-01-01 | End: 2019-01-01 | Stop reason: HOSPADM

## 2019-01-01 RX ORDER — FAMOTIDINE 10 MG/ML
20 INJECTION, SOLUTION INTRAVENOUS EVERY 12 HOURS SCHEDULED
Status: DISCONTINUED | OUTPATIENT
Start: 2019-01-01 | End: 2019-01-01

## 2019-01-01 RX ORDER — METHYLPREDNISOLONE SODIUM SUCCINATE 40 MG/ML
20 INJECTION, POWDER, LYOPHILIZED, FOR SOLUTION INTRAMUSCULAR; INTRAVENOUS EVERY 8 HOURS
Status: DISCONTINUED | OUTPATIENT
Start: 2019-01-01 | End: 2019-01-01

## 2019-01-01 RX ORDER — ALBUTEROL SULFATE 2.5 MG/3ML
2.5 SOLUTION RESPIRATORY (INHALATION)
Status: COMPLETED | OUTPATIENT
Start: 2019-01-01 | End: 2019-01-01

## 2019-01-01 RX ORDER — CARVEDILOL 12.5 MG/1
12.5 TABLET ORAL EVERY 12 HOURS SCHEDULED
Status: DISCONTINUED | OUTPATIENT
Start: 2019-01-01 | End: 2019-01-01 | Stop reason: HOSPADM

## 2019-01-01 RX ORDER — CARVEDILOL 12.5 MG/1
12.5 TABLET ORAL EVERY 12 HOURS
Qty: 60 TABLET | Refills: 3 | Status: SHIPPED | OUTPATIENT
Start: 2019-01-01 | End: 2019-01-01 | Stop reason: HOSPADM

## 2019-01-01 RX ORDER — BISACODYL 5 MG/1
5 TABLET, DELAYED RELEASE ORAL DAILY PRN
Status: DISCONTINUED | OUTPATIENT
Start: 2019-01-01 | End: 2019-01-01 | Stop reason: HOSPADM

## 2019-01-01 RX ORDER — FAMOTIDINE 20 MG/1
20 TABLET, FILM COATED ORAL 2 TIMES DAILY
Status: DISCONTINUED | OUTPATIENT
Start: 2019-01-01 | End: 2019-01-01 | Stop reason: HOSPADM

## 2019-01-01 RX ORDER — CARVEDILOL 25 MG/1
25 TABLET ORAL EVERY 12 HOURS
Qty: 60 TABLET | Refills: 0 | Status: SHIPPED | OUTPATIENT
Start: 2019-01-01 | End: 2019-02-19 | Stop reason: SDUPTHER

## 2019-01-01 RX ADMIN — ATORVASTATIN CALCIUM 40 MG: 20 TABLET, FILM COATED ORAL at 20:01

## 2019-01-01 RX ADMIN — LOSARTAN POTASSIUM 50 MG: 50 TABLET, FILM COATED ORAL at 13:58

## 2019-01-01 RX ADMIN — DEXMEDETOMIDINE HYDROCHLORIDE 0.5 MCG/KG/HR: 100 INJECTION, SOLUTION, CONCENTRATE INTRAVENOUS at 07:36

## 2019-01-01 RX ADMIN — IPRATROPIUM BROMIDE AND ALBUTEROL SULFATE 3 ML: 2.5; .5 SOLUTION RESPIRATORY (INHALATION) at 16:03

## 2019-01-01 RX ADMIN — ASPIRIN 81 MG: 81 TABLET, DELAYED RELEASE ORAL at 13:58

## 2019-01-01 RX ADMIN — METHYLPREDNISOLONE SODIUM SUCCINATE 20 MG: 40 INJECTION, POWDER, FOR SOLUTION INTRAMUSCULAR; INTRAVENOUS at 08:02

## 2019-01-01 RX ADMIN — PREDNISONE 40 MG: 20 TABLET ORAL at 09:25

## 2019-01-01 RX ADMIN — HEPARIN SODIUM 5000 UNITS: 5000 INJECTION INTRAVENOUS; SUBCUTANEOUS at 22:23

## 2019-01-01 RX ADMIN — ENOXAPARIN SODIUM 40 MG: 40 INJECTION SUBCUTANEOUS at 18:32

## 2019-01-01 RX ADMIN — IPRATROPIUM BROMIDE AND ALBUTEROL SULFATE 3 ML: 2.5; .5 SOLUTION RESPIRATORY (INHALATION) at 08:53

## 2019-01-01 RX ADMIN — FAMOTIDINE 20 MG: 20 TABLET, FILM COATED ORAL at 09:25

## 2019-01-01 RX ADMIN — METHYLPREDNISOLONE SODIUM SUCCINATE 20 MG: 40 INJECTION, POWDER, FOR SOLUTION INTRAMUSCULAR; INTRAVENOUS at 00:38

## 2019-01-01 RX ADMIN — IPRATROPIUM BROMIDE AND ALBUTEROL SULFATE 3 ML: 2.5; .5 SOLUTION RESPIRATORY (INHALATION) at 07:38

## 2019-01-01 RX ADMIN — ASPIRIN 81 MG: 81 TABLET, DELAYED RELEASE ORAL at 10:23

## 2019-01-01 RX ADMIN — FUROSEMIDE 40 MG: 40 TABLET ORAL at 08:05

## 2019-01-01 RX ADMIN — LOSARTAN POTASSIUM 50 MG: 50 TABLET, FILM COATED ORAL at 09:10

## 2019-01-01 RX ADMIN — IPRATROPIUM BROMIDE AND ALBUTEROL SULFATE 3 ML: 2.5; .5 SOLUTION RESPIRATORY (INHALATION) at 19:18

## 2019-01-01 RX ADMIN — FUROSEMIDE 40 MG: 40 TABLET ORAL at 08:01

## 2019-01-01 RX ADMIN — IPRATROPIUM BROMIDE AND ALBUTEROL SULFATE 3 ML: 2.5; .5 SOLUTION RESPIRATORY (INHALATION) at 16:07

## 2019-01-01 RX ADMIN — DEXMEDETOMIDINE HYDROCHLORIDE 1.2 MCG/KG/HR: 100 INJECTION, SOLUTION, CONCENTRATE INTRAVENOUS at 03:30

## 2019-01-01 RX ADMIN — IPRATROPIUM BROMIDE AND ALBUTEROL SULFATE 3 ML: 2.5; .5 SOLUTION RESPIRATORY (INHALATION) at 14:33

## 2019-01-01 RX ADMIN — ATORVASTATIN CALCIUM 40 MG: 20 TABLET, FILM COATED ORAL at 20:55

## 2019-01-01 RX ADMIN — METHYLPREDNISOLONE SODIUM SUCCINATE 40 MG: 40 INJECTION, POWDER, LYOPHILIZED, FOR SOLUTION INTRAMUSCULAR; INTRAVENOUS at 16:27

## 2019-01-01 RX ADMIN — HEPARIN SODIUM 5000 UNITS: 5000 INJECTION INTRAVENOUS; SUBCUTANEOUS at 13:33

## 2019-01-01 RX ADMIN — INSULIN LISPRO 1 UNITS: 100 INJECTION, SOLUTION INTRAVENOUS; SUBCUTANEOUS at 08:14

## 2019-01-01 RX ADMIN — AMLODIPINE BESYLATE 10 MG: 10 TABLET ORAL at 14:12

## 2019-01-01 RX ADMIN — CARVEDILOL 12.5 MG: 12.5 TABLET, FILM COATED ORAL at 09:49

## 2019-01-01 RX ADMIN — ENOXAPARIN SODIUM 40 MG: 40 INJECTION SUBCUTANEOUS at 17:51

## 2019-01-01 RX ADMIN — FUROSEMIDE 60 MG: 10 INJECTION INTRAMUSCULAR; INTRAVENOUS at 17:17

## 2019-01-01 RX ADMIN — METHYLPREDNISOLONE SODIUM SUCCINATE 40 MG: 40 INJECTION, POWDER, FOR SOLUTION INTRAMUSCULAR; INTRAVENOUS at 09:39

## 2019-01-01 RX ADMIN — QUETIAPINE FUMARATE 25 MG: 25 TABLET ORAL at 20:45

## 2019-01-01 RX ADMIN — IPRATROPIUM BROMIDE AND ALBUTEROL SULFATE 3 ML: 2.5; .5 SOLUTION RESPIRATORY (INHALATION) at 09:05

## 2019-01-01 RX ADMIN — INSULIN HUMAN 8 UNITS: 100 INJECTION, SOLUTION PARENTERAL at 11:52

## 2019-01-01 RX ADMIN — HEPARIN SODIUM 5000 UNITS: 5000 INJECTION INTRAVENOUS; SUBCUTANEOUS at 21:58

## 2019-01-01 RX ADMIN — IPRATROPIUM BROMIDE AND ALBUTEROL SULFATE 3 ML: 2.5; .5 SOLUTION RESPIRATORY (INHALATION) at 12:47

## 2019-01-01 RX ADMIN — IPRATROPIUM BROMIDE AND ALBUTEROL SULFATE 3 ML: 2.5; .5 SOLUTION RESPIRATORY (INHALATION) at 07:06

## 2019-01-01 RX ADMIN — FAMOTIDINE 20 MG: 20 TABLET, FILM COATED ORAL at 20:01

## 2019-01-01 RX ADMIN — FUROSEMIDE 40 MG: 40 TABLET ORAL at 08:13

## 2019-01-01 RX ADMIN — ALBUTEROL SULFATE 2.5 MG: 2.5 SOLUTION RESPIRATORY (INHALATION) at 17:30

## 2019-01-01 RX ADMIN — IPRATROPIUM BROMIDE AND ALBUTEROL SULFATE 3 ML: .5; 3 SOLUTION RESPIRATORY (INHALATION) at 16:54

## 2019-01-01 RX ADMIN — METHYLPREDNISOLONE SODIUM SUCCINATE 40 MG: 40 INJECTION, POWDER, FOR SOLUTION INTRAMUSCULAR; INTRAVENOUS at 02:19

## 2019-01-01 RX ADMIN — CARVEDILOL 12.5 MG: 12.5 TABLET, FILM COATED ORAL at 18:32

## 2019-01-01 RX ADMIN — METHYLPREDNISOLONE SODIUM SUCCINATE 40 MG: 40 INJECTION, POWDER, FOR SOLUTION INTRAMUSCULAR; INTRAVENOUS at 10:11

## 2019-01-01 RX ADMIN — METHYLPREDNISOLONE SODIUM SUCCINATE 40 MG: 40 INJECTION, POWDER, LYOPHILIZED, FOR SOLUTION INTRAMUSCULAR; INTRAVENOUS at 00:26

## 2019-01-01 RX ADMIN — ENOXAPARIN SODIUM 40 MG: 40 INJECTION SUBCUTANEOUS at 17:26

## 2019-01-01 RX ADMIN — FAMOTIDINE 20 MG: 20 TABLET, FILM COATED ORAL at 10:23

## 2019-01-01 RX ADMIN — ASPIRIN 81 MG: 81 TABLET, DELAYED RELEASE ORAL at 09:25

## 2019-01-01 RX ADMIN — IPRATROPIUM BROMIDE AND ALBUTEROL SULFATE 3 ML: 2.5; .5 SOLUTION RESPIRATORY (INHALATION) at 11:51

## 2019-01-01 RX ADMIN — NICOTINE 1 PATCH: 14 PATCH, EXTENDED RELEASE TRANSDERMAL at 08:49

## 2019-01-01 RX ADMIN — METHYLPREDNISOLONE SODIUM SUCCINATE 20 MG: 40 INJECTION, POWDER, FOR SOLUTION INTRAMUSCULAR; INTRAVENOUS at 16:50

## 2019-01-01 RX ADMIN — IPRATROPIUM BROMIDE AND ALBUTEROL SULFATE 3 ML: 2.5; .5 SOLUTION RESPIRATORY (INHALATION) at 15:53

## 2019-01-01 RX ADMIN — DEXMEDETOMIDINE HYDROCHLORIDE 0.6 MCG/KG/HR: 100 INJECTION, SOLUTION, CONCENTRATE INTRAVENOUS at 18:03

## 2019-01-01 RX ADMIN — IPRATROPIUM BROMIDE AND ALBUTEROL SULFATE 3 ML: 2.5; .5 SOLUTION RESPIRATORY (INHALATION) at 19:48

## 2019-01-01 RX ADMIN — ATORVASTATIN CALCIUM 40 MG: 20 TABLET, FILM COATED ORAL at 23:15

## 2019-01-01 RX ADMIN — METHYLPREDNISOLONE SODIUM SUCCINATE 40 MG: 40 INJECTION, POWDER, LYOPHILIZED, FOR SOLUTION INTRAMUSCULAR; INTRAVENOUS at 00:14

## 2019-01-01 RX ADMIN — CARVEDILOL 12.5 MG: 12.5 TABLET, FILM COATED ORAL at 18:36

## 2019-01-01 RX ADMIN — QUETIAPINE FUMARATE 25 MG: 25 TABLET ORAL at 22:04

## 2019-01-01 RX ADMIN — CARVEDILOL 12.5 MG: 12.5 TABLET, FILM COATED ORAL at 09:53

## 2019-01-01 RX ADMIN — HEPARIN SODIUM 5000 UNITS: 5000 INJECTION INTRAVENOUS; SUBCUTANEOUS at 14:44

## 2019-01-01 RX ADMIN — TAMSULOSIN HYDROCHLORIDE 0.4 MG: 0.4 CAPSULE ORAL at 10:23

## 2019-01-01 RX ADMIN — HYDRALAZINE HYDROCHLORIDE 10 MG: 20 INJECTION INTRAMUSCULAR; INTRAVENOUS at 19:50

## 2019-01-01 RX ADMIN — METHYLPREDNISOLONE SODIUM SUCCINATE 125 MG: 125 INJECTION, POWDER, FOR SOLUTION INTRAMUSCULAR; INTRAVENOUS at 07:28

## 2019-01-01 RX ADMIN — CARVEDILOL 12.5 MG: 12.5 TABLET, FILM COATED ORAL at 05:59

## 2019-01-01 RX ADMIN — DOCUSATE SODIUM 100 MG: 100 CAPSULE, LIQUID FILLED ORAL at 22:04

## 2019-01-01 RX ADMIN — PREDNISONE 40 MG: 20 TABLET ORAL at 08:28

## 2019-01-01 RX ADMIN — INSULIN LISPRO 2 UNITS: 100 INJECTION, SOLUTION INTRAVENOUS; SUBCUTANEOUS at 21:38

## 2019-01-01 RX ADMIN — INSULIN LISPRO 4 UNITS: 100 INJECTION, SOLUTION INTRAVENOUS; SUBCUTANEOUS at 22:04

## 2019-01-01 RX ADMIN — HEPARIN SODIUM 5000 UNITS: 5000 INJECTION INTRAVENOUS; SUBCUTANEOUS at 14:33

## 2019-01-01 RX ADMIN — IPRATROPIUM BROMIDE AND ALBUTEROL SULFATE 3 ML: 2.5; .5 SOLUTION RESPIRATORY (INHALATION) at 15:00

## 2019-01-01 RX ADMIN — IPRATROPIUM BROMIDE AND ALBUTEROL SULFATE 3 ML: 2.5; .5 SOLUTION RESPIRATORY (INHALATION) at 19:16

## 2019-01-01 RX ADMIN — ENOXAPARIN SODIUM 40 MG: 40 INJECTION SUBCUTANEOUS at 20:10

## 2019-01-01 RX ADMIN — DOCUSATE SODIUM 100 MG: 100 CAPSULE, LIQUID FILLED ORAL at 08:01

## 2019-01-01 RX ADMIN — DEXTROSE MONOHYDRATE 25 ML: 25 INJECTION, SOLUTION INTRAVENOUS at 11:52

## 2019-01-01 RX ADMIN — CARVEDILOL 12.5 MG: 12.5 TABLET, FILM COATED ORAL at 20:45

## 2019-01-01 RX ADMIN — CARVEDILOL 12.5 MG: 12.5 TABLET, FILM COATED ORAL at 22:04

## 2019-01-01 RX ADMIN — ASPIRIN 81 MG: 81 TABLET, DELAYED RELEASE ORAL at 08:05

## 2019-01-01 RX ADMIN — SODIUM CHLORIDE, PRESERVATIVE FREE 10 ML: 5 INJECTION INTRAVENOUS at 08:30

## 2019-01-01 RX ADMIN — CARVEDILOL 12.5 MG: 12.5 TABLET, FILM COATED ORAL at 08:12

## 2019-01-01 RX ADMIN — TAMSULOSIN HYDROCHLORIDE 0.4 MG: 0.4 CAPSULE ORAL at 09:39

## 2019-01-01 RX ADMIN — DOCUSATE SODIUM 100 MG: 100 CAPSULE, LIQUID FILLED ORAL at 21:58

## 2019-01-01 RX ADMIN — METHYLPREDNISOLONE SODIUM SUCCINATE 40 MG: 40 INJECTION, POWDER, FOR SOLUTION INTRAMUSCULAR; INTRAVENOUS at 10:24

## 2019-01-01 RX ADMIN — LOSARTAN POTASSIUM 50 MG: 50 TABLET, FILM COATED ORAL at 08:04

## 2019-01-01 RX ADMIN — METHYLPREDNISOLONE SODIUM SUCCINATE 40 MG: 40 INJECTION, POWDER, FOR SOLUTION INTRAMUSCULAR; INTRAVENOUS at 18:36

## 2019-01-01 RX ADMIN — METHYLPREDNISOLONE SODIUM SUCCINATE 40 MG: 40 INJECTION, POWDER, LYOPHILIZED, FOR SOLUTION INTRAMUSCULAR; INTRAVENOUS at 08:49

## 2019-01-01 RX ADMIN — FAMOTIDINE 20 MG: 20 TABLET, FILM COATED ORAL at 23:15

## 2019-01-01 RX ADMIN — IPRATROPIUM BROMIDE AND ALBUTEROL SULFATE 3 ML: 2.5; .5 SOLUTION RESPIRATORY (INHALATION) at 20:08

## 2019-01-01 RX ADMIN — NICOTINE 1 PATCH: 14 PATCH, EXTENDED RELEASE TRANSDERMAL at 08:03

## 2019-01-01 RX ADMIN — FUROSEMIDE 40 MG: 40 TABLET ORAL at 08:29

## 2019-01-01 RX ADMIN — IPRATROPIUM BROMIDE AND ALBUTEROL SULFATE 3 ML: 2.5; .5 SOLUTION RESPIRATORY (INHALATION) at 03:17

## 2019-01-01 RX ADMIN — INSULIN LISPRO 2 UNITS: 100 INJECTION, SOLUTION INTRAVENOUS; SUBCUTANEOUS at 21:57

## 2019-01-01 RX ADMIN — IPRATROPIUM BROMIDE AND ALBUTEROL SULFATE 3 ML: 2.5; .5 SOLUTION RESPIRATORY (INHALATION) at 03:34

## 2019-01-01 RX ADMIN — IPRATROPIUM BROMIDE AND ALBUTEROL SULFATE 3 ML: 2.5; .5 SOLUTION RESPIRATORY (INHALATION) at 08:46

## 2019-01-01 RX ADMIN — HEPARIN SODIUM 5000 UNITS: 5000 INJECTION INTRAVENOUS; SUBCUTANEOUS at 14:25

## 2019-01-01 RX ADMIN — IPRATROPIUM BROMIDE AND ALBUTEROL SULFATE 3 ML: 2.5; .5 SOLUTION RESPIRATORY (INHALATION) at 20:31

## 2019-01-01 RX ADMIN — CARVEDILOL 12.5 MG: 12.5 TABLET, FILM COATED ORAL at 06:44

## 2019-01-01 RX ADMIN — TAMSULOSIN HYDROCHLORIDE 0.4 MG: 0.4 CAPSULE ORAL at 09:25

## 2019-01-01 RX ADMIN — IPRATROPIUM BROMIDE AND ALBUTEROL SULFATE 3 ML: 2.5; .5 SOLUTION RESPIRATORY (INHALATION) at 10:59

## 2019-01-01 RX ADMIN — DEXMEDETOMIDINE HYDROCHLORIDE 1 MCG/KG/HR: 100 INJECTION, SOLUTION, CONCENTRATE INTRAVENOUS at 23:30

## 2019-01-01 RX ADMIN — HEPARIN SODIUM 5000 UNITS: 5000 INJECTION INTRAVENOUS; SUBCUTANEOUS at 14:51

## 2019-01-01 RX ADMIN — IPRATROPIUM BROMIDE AND ALBUTEROL SULFATE 3 ML: 2.5; .5 SOLUTION RESPIRATORY (INHALATION) at 11:30

## 2019-01-01 RX ADMIN — TAMSULOSIN HYDROCHLORIDE 0.4 MG: 0.4 CAPSULE ORAL at 08:05

## 2019-01-01 RX ADMIN — LISINOPRIL 10 MG: 10 TABLET ORAL at 13:02

## 2019-01-01 RX ADMIN — DOCUSATE SODIUM 100 MG: 100 CAPSULE, LIQUID FILLED ORAL at 12:09

## 2019-01-01 RX ADMIN — METHYLPREDNISOLONE SODIUM SUCCINATE 40 MG: 40 INJECTION, POWDER, FOR SOLUTION INTRAMUSCULAR; INTRAVENOUS at 23:15

## 2019-01-01 RX ADMIN — HEPARIN SODIUM 5000 UNITS: 5000 INJECTION INTRAVENOUS; SUBCUTANEOUS at 05:54

## 2019-01-01 RX ADMIN — FUROSEMIDE 40 MG: 40 TABLET ORAL at 09:53

## 2019-01-01 RX ADMIN — METHYLPREDNISOLONE SODIUM SUCCINATE 40 MG: 40 INJECTION, POWDER, LYOPHILIZED, FOR SOLUTION INTRAMUSCULAR; INTRAVENOUS at 15:15

## 2019-01-01 RX ADMIN — INSULIN LISPRO 4 UNITS: 100 INJECTION, SOLUTION INTRAVENOUS; SUBCUTANEOUS at 11:57

## 2019-01-01 RX ADMIN — METHYLPREDNISOLONE SODIUM SUCCINATE 20 MG: 40 INJECTION, POWDER, FOR SOLUTION INTRAMUSCULAR; INTRAVENOUS at 08:13

## 2019-01-01 RX ADMIN — HYDRALAZINE HYDROCHLORIDE 20 MG: 20 INJECTION INTRAMUSCULAR; INTRAVENOUS at 11:58

## 2019-01-01 RX ADMIN — IPRATROPIUM BROMIDE AND ALBUTEROL SULFATE 3 ML: 2.5; .5 SOLUTION RESPIRATORY (INHALATION) at 23:28

## 2019-01-01 RX ADMIN — HEPARIN SODIUM 5000 UNITS: 5000 INJECTION INTRAVENOUS; SUBCUTANEOUS at 22:48

## 2019-01-01 RX ADMIN — METHYLPREDNISOLONE SODIUM SUCCINATE 20 MG: 40 INJECTION, POWDER, FOR SOLUTION INTRAMUSCULAR; INTRAVENOUS at 16:45

## 2019-01-01 RX ADMIN — IPRATROPIUM BROMIDE AND ALBUTEROL SULFATE 3 ML: 2.5; .5 SOLUTION RESPIRATORY (INHALATION) at 20:25

## 2019-01-01 RX ADMIN — NICOTINE 1 PATCH: 14 PATCH, EXTENDED RELEASE TRANSDERMAL at 23:15

## 2019-01-01 RX ADMIN — FAMOTIDINE 20 MG: 20 TABLET, FILM COATED ORAL at 20:55

## 2019-01-01 RX ADMIN — IPRATROPIUM BROMIDE AND ALBUTEROL SULFATE 3 ML: 2.5; .5 SOLUTION RESPIRATORY (INHALATION) at 11:50

## 2019-01-01 RX ADMIN — IPRATROPIUM BROMIDE AND ALBUTEROL SULFATE 3 ML: 2.5; .5 SOLUTION RESPIRATORY (INHALATION) at 11:33

## 2019-01-01 RX ADMIN — SODIUM CHLORIDE 100 ML/HR: 9 INJECTION, SOLUTION INTRAVENOUS at 11:52

## 2019-01-01 RX ADMIN — IPRATROPIUM BROMIDE AND ALBUTEROL SULFATE 3 ML: 2.5; .5 SOLUTION RESPIRATORY (INHALATION) at 23:19

## 2019-01-01 RX ADMIN — DOCUSATE SODIUM 100 MG: 100 CAPSULE, LIQUID FILLED ORAL at 08:49

## 2019-01-01 RX ADMIN — HEPARIN SODIUM 5000 UNITS: 5000 INJECTION INTRAVENOUS; SUBCUTANEOUS at 07:28

## 2019-01-01 RX ADMIN — PREDNISONE 20 MG: 20 TABLET ORAL at 17:46

## 2019-01-01 RX ADMIN — FUROSEMIDE 40 MG: 40 TABLET ORAL at 09:25

## 2019-01-01 RX ADMIN — SODIUM CHLORIDE 500 ML: 9 INJECTION, SOLUTION INTRAVENOUS at 01:05

## 2019-01-01 RX ADMIN — IPRATROPIUM BROMIDE AND ALBUTEROL SULFATE 3 ML: 2.5; .5 SOLUTION RESPIRATORY (INHALATION) at 11:12

## 2019-01-01 RX ADMIN — IPRATROPIUM BROMIDE AND ALBUTEROL SULFATE 3 ML: 2.5; .5 SOLUTION RESPIRATORY (INHALATION) at 07:03

## 2019-01-01 RX ADMIN — NICOTINE 1 PATCH: 14 PATCH, EXTENDED RELEASE TRANSDERMAL at 03:13

## 2019-01-01 RX ADMIN — IPRATROPIUM BROMIDE AND ALBUTEROL SULFATE 3 ML: 2.5; .5 SOLUTION RESPIRATORY (INHALATION) at 07:29

## 2019-01-01 RX ADMIN — DOCUSATE SODIUM 100 MG: 100 CAPSULE, LIQUID FILLED ORAL at 08:12

## 2019-01-01 RX ADMIN — NICOTINE 1 PATCH: 14 PATCH, EXTENDED RELEASE TRANSDERMAL at 08:13

## 2019-01-01 RX ADMIN — FUROSEMIDE 40 MG: 40 TABLET ORAL at 10:23

## 2019-01-01 RX ADMIN — FUROSEMIDE 60 MG: 10 INJECTION, SOLUTION INTRAMUSCULAR; INTRAVENOUS at 17:17

## 2019-01-01 RX ADMIN — DEXMEDETOMIDINE HYDROCHLORIDE 0.8 MCG/KG/HR: 100 INJECTION, SOLUTION, CONCENTRATE INTRAVENOUS at 08:14

## 2019-01-01 RX ADMIN — IPRATROPIUM BROMIDE AND ALBUTEROL SULFATE 3 ML: 2.5; .5 SOLUTION RESPIRATORY (INHALATION) at 15:01

## 2019-01-01 RX ADMIN — ATORVASTATIN CALCIUM 40 MG: 20 TABLET, FILM COATED ORAL at 20:16

## 2019-01-01 RX ADMIN — FUROSEMIDE 40 MG: 10 INJECTION, SOLUTION INTRAMUSCULAR; INTRAVENOUS at 12:08

## 2019-01-01 RX ADMIN — METHYLPREDNISOLONE SODIUM SUCCINATE 40 MG: 40 INJECTION, POWDER, LYOPHILIZED, FOR SOLUTION INTRAMUSCULAR; INTRAVENOUS at 08:14

## 2019-01-01 RX ADMIN — CARVEDILOL 25 MG: 25 TABLET, FILM COATED ORAL at 06:18

## 2019-01-01 RX ADMIN — METHYLPREDNISOLONE SODIUM SUCCINATE 20 MG: 40 INJECTION, POWDER, FOR SOLUTION INTRAMUSCULAR; INTRAVENOUS at 01:57

## 2019-01-01 RX ADMIN — IPRATROPIUM BROMIDE AND ALBUTEROL SULFATE 3 ML: 2.5; .5 SOLUTION RESPIRATORY (INHALATION) at 03:13

## 2019-01-01 RX ADMIN — NICOTINE 1 PATCH: 14 PATCH, EXTENDED RELEASE TRANSDERMAL at 16:27

## 2019-01-01 RX ADMIN — IPRATROPIUM BROMIDE AND ALBUTEROL SULFATE 3 ML: 2.5; .5 SOLUTION RESPIRATORY (INHALATION) at 07:37

## 2019-01-01 RX ADMIN — LOSARTAN POTASSIUM 50 MG: 50 TABLET, FILM COATED ORAL at 10:23

## 2019-01-01 RX ADMIN — IPRATROPIUM BROMIDE AND ALBUTEROL SULFATE 3 ML: 2.5; .5 SOLUTION RESPIRATORY (INHALATION) at 15:04

## 2019-01-01 RX ADMIN — DOCUSATE SODIUM 100 MG: 100 CAPSULE, LIQUID FILLED ORAL at 20:45

## 2019-01-01 RX ADMIN — HEPARIN SODIUM 5000 UNITS: 5000 INJECTION INTRAVENOUS; SUBCUTANEOUS at 22:05

## 2019-01-01 RX ADMIN — METHYLPREDNISOLONE SODIUM SUCCINATE 40 MG: 40 INJECTION, POWDER, FOR SOLUTION INTRAMUSCULAR; INTRAVENOUS at 20:10

## 2019-01-01 RX ADMIN — IPRATROPIUM BROMIDE AND ALBUTEROL SULFATE 3 ML: 2.5; .5 SOLUTION RESPIRATORY (INHALATION) at 13:32

## 2019-01-01 RX ADMIN — FAMOTIDINE 20 MG: 10 INJECTION INTRAVENOUS at 22:42

## 2019-01-01 RX ADMIN — INSULIN LISPRO 2 UNITS: 100 INJECTION, SOLUTION INTRAVENOUS; SUBCUTANEOUS at 17:25

## 2019-01-01 RX ADMIN — ALBUTEROL SULFATE 2.5 MG: 2.5 SOLUTION RESPIRATORY (INHALATION) at 17:20

## 2019-01-01 RX ADMIN — IPRATROPIUM BROMIDE AND ALBUTEROL SULFATE 3 ML: 2.5; .5 SOLUTION RESPIRATORY (INHALATION) at 16:28

## 2019-01-01 RX ADMIN — HEPARIN SODIUM 5000 UNITS: 5000 INJECTION INTRAVENOUS; SUBCUTANEOUS at 06:43

## 2019-01-01 RX ADMIN — IPRATROPIUM BROMIDE AND ALBUTEROL SULFATE 3 ML: 2.5; .5 SOLUTION RESPIRATORY (INHALATION) at 19:47

## 2019-01-01 RX ADMIN — FAMOTIDINE 20 MG: 20 TABLET, FILM COATED ORAL at 08:05

## 2019-01-01 RX ADMIN — CARVEDILOL 12.5 MG: 12.5 TABLET, FILM COATED ORAL at 06:18

## 2019-01-01 RX ADMIN — INSULIN LISPRO 3 UNITS: 100 INJECTION, SOLUTION INTRAVENOUS; SUBCUTANEOUS at 12:13

## 2019-01-01 RX ADMIN — HEPARIN SODIUM 5000 UNITS: 5000 INJECTION INTRAVENOUS; SUBCUTANEOUS at 06:26

## 2019-01-01 RX ADMIN — IPRATROPIUM BROMIDE AND ALBUTEROL SULFATE 3 ML: 2.5; .5 SOLUTION RESPIRATORY (INHALATION) at 08:19

## 2019-01-01 RX ADMIN — ENOXAPARIN SODIUM 40 MG: 40 INJECTION SUBCUTANEOUS at 18:36

## 2019-01-01 RX ADMIN — FAMOTIDINE 20 MG: 20 TABLET, FILM COATED ORAL at 08:28

## 2019-01-01 RX ADMIN — FUROSEMIDE 40 MG: 40 TABLET ORAL at 09:39

## 2019-01-01 RX ADMIN — TAMSULOSIN HYDROCHLORIDE 0.4 MG: 0.4 CAPSULE ORAL at 08:29

## 2019-01-01 RX ADMIN — HEPARIN SODIUM 5000 UNITS: 5000 INJECTION INTRAVENOUS; SUBCUTANEOUS at 06:59

## 2019-01-01 RX ADMIN — IPRATROPIUM BROMIDE AND ALBUTEROL SULFATE 3 ML: 2.5; .5 SOLUTION RESPIRATORY (INHALATION) at 10:37

## 2019-01-01 RX ADMIN — CARVEDILOL 12.5 MG: 12.5 TABLET, FILM COATED ORAL at 05:50

## 2019-01-01 RX ADMIN — CARVEDILOL 12.5 MG: 12.5 TABLET, FILM COATED ORAL at 17:51

## 2019-01-01 RX ADMIN — FAMOTIDINE 20 MG: 20 TABLET, FILM COATED ORAL at 20:29

## 2019-01-01 RX ADMIN — FAMOTIDINE 20 MG: 10 INJECTION INTRAVENOUS at 09:39

## 2019-01-01 RX ADMIN — QUETIAPINE FUMARATE 25 MG: 25 TABLET ORAL at 21:57

## 2019-01-01 RX ADMIN — IPRATROPIUM BROMIDE AND ALBUTEROL SULFATE 3 ML: 2.5; .5 SOLUTION RESPIRATORY (INHALATION) at 23:12

## 2019-01-01 RX ADMIN — IPRATROPIUM BROMIDE AND ALBUTEROL SULFATE 3 ML: 2.5; .5 SOLUTION RESPIRATORY (INHALATION) at 07:31

## 2019-01-01 RX ADMIN — IPRATROPIUM BROMIDE AND ALBUTEROL SULFATE 3 ML: 2.5; .5 SOLUTION RESPIRATORY (INHALATION) at 23:46

## 2019-01-01 RX ADMIN — ASPIRIN 81 MG: 81 TABLET, DELAYED RELEASE ORAL at 08:29

## 2019-01-01 RX ADMIN — CARVEDILOL 12.5 MG: 12.5 TABLET, FILM COATED ORAL at 17:26

## 2019-01-01 RX ADMIN — LOSARTAN POTASSIUM 50 MG: 50 TABLET, FILM COATED ORAL at 09:25

## 2019-01-03 NOTE — OUTREACH NOTE
CT Surgery Week 2 Survey      Responses   Facility patient discharged from?  [facility]   Does the patient have one of the following disease processes/diagnoses(primary or secondary)?  Cardiothoracic surgery   Week 2 attempt successful?  No   Unsuccessful attempts  Attempt 1          Savannah Kimble RN

## 2019-01-04 PROBLEM — R09.02 HYPOXIA: Status: ACTIVE | Noted: 2019-01-01

## 2019-01-04 PROBLEM — D64.9 ANEMIA: Status: ACTIVE | Noted: 2019-01-01

## 2019-01-04 NOTE — OUTREACH NOTE
CT Surgery Week 2 Survey      Responses   Facility patient discharged from?  [facility]   Does the patient have one of the following disease processes/diagnoses(primary or secondary)?  Cardiothoracic surgery   Week 2 attempt successful?  No   Unsuccessful attempts  Attempt 2          Bri Marrero RN

## 2019-01-04 NOTE — PROGRESS NOTES
Fredis Lockwood is a 68 y.o. male.      Assessment/Plan   Problem List Items Addressed This Visit        Cardiovascular and Mediastinum    Benign essential HTN - Primary    Relevant Orders    CBC & Differential    Comprehensive Metabolic Panel    Elevated cholesterol    Coronary artery disease involving native coronary artery of native heart with angina pectoris (CMS/HCC)    Overview     Added automatically from request for surgery [id number]         Ischemic cardiomyopathy       Respiratory    COPD (chronic obstructive pulmonary disease) (CMS/HCC)    Hypoxia    Relevant Orders    XR Chest PA & Lateral       Hematopoietic and Hemostatic    Anemia    Relevant Orders    CBC & Differential      Other Visit Diagnoses     Hyperglycemia        Relevant Orders    Hemoglobin A1c           Follow-up results of lab work follow-up results of chest x-ray oxygen therapy 1 L nasal cannula 24 hours if not if not continuing to improve daily follow-up appointment or to [facility] emergency department      Chief Complaint   Patient presents with   • [facility] follow up to coronary artery bypass     Social History     Tobacco Use   • Smoking status: Former Smoker     Types: Cigarettes   • Smokeless tobacco: Never Used   Substance Use Topics   • Alcohol use: Yes     Alcohol/week: 3.6 oz     Types: 6 Cans of beer per week     Binge frequency: Weekly     Comment: 6 Beers/Wk   • Drug use: No       History of Present Illness   Follows up office appointment for chronic problems of hypertension coronary disease hyperlipidemia COPD cardiomyopathy hypoxia,  Patient admitted 1214 discharge date 12/23/18 status post coronary disease bypass grafting with subsequent persistent hypoxia although patient says he is feeling better every day and has chronic shortness of breath he is using oxygen therapy at bedtime but not during the day does not have any falling or weakness to some fatigue and shortness of breath.  Thinks is gradually  "getting better he's been referred to cardiac rehabilitation but has not partaken his to have monitoring of electrolytes anemia and blood sugar he has no other acute concerns   The following portions of the patient's history were reviewed and updated as appropriate:PMHroutine: Social history , Past Medical History, Surgical history , Allergies, Current Medications, Active Problem List and Health Maintenance  Current outpatient and discharge medications have been reconciled for the patient.  Reviewed by: Jus Durham MD  Review of Systems   Constitutional: Positive for fatigue.   HENT: Negative.    Eyes: Negative.    Respiratory: Positive for shortness of breath.    Cardiovascular: Positive for leg swelling.   Gastrointestinal: Negative.    Genitourinary: Negative.    Musculoskeletal: Negative.    Neurological: Negative.    Psychiatric/Behavioral: Negative.        Objective   Vitals:    01/04/19 1108   BP: 100/50   BP Location: Left arm   Patient Position: Sitting   Cuff Size: Adult   Pulse: 80   Temp: 99.2 °F (37.3 °C)   TempSrc: Oral   SpO2: (!) 76%   Weight: 100 kg (221 lb 8 oz)   Height: 177.8 cm (70\")     Body mass index is 31.78 kg/m².  Physical Exam   Constitutional: He is oriented to person, place, and time. He appears well-developed and well-nourished.   HENT:   Head: Normocephalic and atraumatic.   Mouth/Throat: Oropharynx is clear and moist.   Eyes: Conjunctivae are normal. Pupils are equal, round, and reactive to light. Left eye exhibits discharge.   Neck: Normal range of motion. No JVD present. Carotid bruit is not present.   Cardiovascular: Normal rate and regular rhythm.   Pulmonary/Chest: Effort normal. He has decreased breath sounds.   Abdominal: Soft. Bowel sounds are normal. He exhibits no distension. There is no tenderness.   Musculoskeletal: He exhibits edema.   Neurological: He is alert and oriented to person, place, and time.   Psychiatric: He has a normal mood and affect. His behavior is " normal. Judgment and thought content normal.   Nursing note and vitals reviewed.  O2 NC  increase oxygen to 80 percent  Reviewed Data:  Admission on 12/14/2018, Discharged on 12/23/2018   No results displayed because visit has over 200 results.      Hospital Outpatient Visit on 12/07/2018   Component Date Value Ref Range Status   • Nuclear Prior Study 12/07/2018 2   Final   • BH CV STRESS PROTOCOL 1 12/07/2018 David   Final   • Stage 1 12/07/2018 1   Final   • HR Stage 1 12/07/2018 115   Final   • BP Stage 1 12/07/2018 150/88   Final   • Duration Min Stage 1 12/07/2018 3   Final   • Duration Sec Stage 1 12/07/2018 0   Final   • Grade Stage 1 12/07/2018 10   Final   • Speed Stage 1 12/07/2018 1.7   Final   • BH CV STRESS METS STAGE 1 12/07/2018 5   Final   • Stage 2 12/07/2018 2   Final   • HR Stage 2 12/07/2018 135   Final   • BP Stage 2 12/07/2018 160/90   Final   • Duration Min Stage 2 12/07/2018 3   Final   • Duration Sec Stage 2 12/07/2018 0   Final   • Grade Stage 2 12/07/2018 12   Final   • Speed Stage 2 12/07/2018 2.5   Final   • BH CV STRESS METS STAGE 2 12/07/2018 7.5   Final   • Baseline HR 12/07/2018 88  bpm Final   • Baseline BP 12/07/2018 148/90  mmHg Final   • Peak HR 12/07/2018 135  bpm Final   • Percent Max Pred HR 12/07/2018 90.00  % Final   • Percent Target HR 12/07/2018 106  % Final   • Peak BP 12/07/2018 160/90  mmHg Final   • Recovery HR 12/07/2018 97  bpm Final   • Recovery BP 12/07/2018 170/82  mmHg Final   • Target HR (85%) 12/07/2018 128  bpm Final   • Max. Pred. HR (100%) 12/07/2018 150  bpm Final   • Exercise duration (min) 12/07/2018 6  min Final   • Exercise duration (sec) 12/07/2018 0  sec Final   • Estimated workload 12/07/2018 7.0  METS Final   • Nuc Stress EF 12/07/2018 24  % Final   Lab on 12/07/2018   Component Date Value Ref Range Status   • Glucose 12/07/2018 107* 65 - 99 mg/dL Final   • BUN 12/07/2018 14  8 - 23 mg/dL Final   • Creatinine 12/07/2018 0.94  0.76 - 1.27 mg/dL Final    • Sodium 12/07/2018 140  136 - 145 mmol/L Final   • Potassium 12/07/2018 3.9  3.5 - 5.2 mmol/L Final   • Chloride 12/07/2018 99  98 - 107 mmol/L Final   • CO2 12/07/2018 31.0* 22.0 - 29.0 mmol/L Final   • Calcium 12/07/2018 9.8  8.6 - 10.5 mg/dL Final   • eGFR Non  Amer 12/07/2018 79  >60 mL/min/1.73 Final   • BUN/Creatinine Ratio 12/07/2018 14.9  7.0 - 25.0 Final   • Anion Gap 12/07/2018 10.0  mmol/L Final   • WBC 12/07/2018 10.29  4.50 - 10.70 10*3/mm3 Final   • RBC 12/07/2018 5.00  4.60 - 6.00 10*6/mm3 Final   • Hemoglobin 12/07/2018 15.1  13.7 - 17.6 g/dL Final   • Hematocrit 12/07/2018 47.9  40.4 - 52.2 % Final   • MCV 12/07/2018 95.8  79.8 - 96.2 fL Final   • MCH 12/07/2018 30.2  27.0 - 32.7 pg Final   • MCHC 12/07/2018 31.5* 32.6 - 36.4 g/dL Final   • RDW 12/07/2018 16.7* 11.5 - 14.5 % Final   • RDW-SD 12/07/2018 58.8* 37.0 - 54.0 fl Final   • MPV 12/07/2018 9.9  6.0 - 12.0 fL Final   • Platelets 12/07/2018 198  140 - 500 10*3/mm3 Final   • Neutrophil % 12/07/2018 62.9  42.7 - 76.0 % Final   • Lymphocyte % 12/07/2018 27.9  19.6 - 45.3 % Final   • Monocyte % 12/07/2018 6.3  5.0 - 12.0 % Final   • Eosinophil % 12/07/2018 2.5  0.3 - 6.2 % Final   • Basophil % 12/07/2018 0.1  0.0 - 1.5 % Final   • Immature Grans % 12/07/2018 0.3  0.0 - 0.5 % Final   • Neutrophils, Absolute 12/07/2018 6.47  1.90 - 8.10 10*3/mm3 Final   • Lymphocytes, Absolute 12/07/2018 2.87  0.90 - 4.80 10*3/mm3 Final   • Monocytes, Absolute 12/07/2018 0.65  0.20 - 1.20 10*3/mm3 Final   • Eosinophils, Absolute 12/07/2018 0.26  0.00 - 0.70 10*3/mm3 Final   • Basophils, Absolute 12/07/2018 0.01  0.00 - 0.20 10*3/mm3 Final   • Immature Grans, Absolute 12/07/2018 0.03  0.00 - 0.03 10*3/mm3 Final

## 2019-01-06 PROBLEM — R41.82 ALTERED MENTAL STATUS: Status: ACTIVE | Noted: 2019-01-01

## 2019-01-07 NOTE — H&P
Patient Name: Fredis Lockwood  Age/Sex: 68 y.o. male  : 1950  MRN: [id number]    Date of Admission: 2019  Date of Encounter Visit: 19  Encounter Provider: Fredis Collado MD  Place of Service: [facility] CARDIOLOGY      Referring Provider: Lio Brown MD  Patient Care Team:  Jus Durham MD as PCP - General (Family Medicine)    Subjective:     Admitted for: Altered Mental Status    Chief Complaint: Altered Mental Status   History of Present Illness:  Fredis Lockwood is a 68 y.o. male patient of mine with a history of COPD, hypertension, hyperlipidemia.  He was seen in 2018 at the request of his PCP for exertional dyspnea.  He underwent stress test which was positive so he was admitted on 2018 for cardiac catheteterization. His cardiac catheterization revealed severe three vessel disease.  He had CABG x 7 on 2018. Post-operatively he developed some tachycardia and new LBBB and was started on amiodarone and beta blockers.  He was discharged on 2018.  He was seen in the office by Amy Yadav on 2019.  At the time of that visit, he was doing well with minimal SOA and chest discomfort.  He denied any palpitations and his energy levels were improving.      He presented to the ED last evening by EMS with altered mental status. The family noticed he had decreased level of consciousness.  When EMS arrived he was noted to have O2 sats in the 50-60 range on RA that improved with addition of NRB.  He was also given Narcan without improvement. His O2 sats on arrival to the ED were 78%. CT of head was negative. Labs included Bun/creat 22/1.49, K 5.3, troponin 0.173, BNP 5650, Hgb 9.9, WBC 12.5. He was admitted for further evaluation.     Once admitted to the floor he became more obtunded.  Rapid response was called and he was transferred to the ICU.  He was found to be severely hypercapnic with pCO2 143 and placed on BIPAP. This  morning he remains on BIPAP and is somewhat confused.  This morning troponin was 0.181.          Previous testing:   Cardiac Catheterization 12/14/2018  Hemodynamics:  1.  Cardiac output (Toi): 3.76 L/m  2.  Cardiac index (Toi) 1.76 L/m/m²  3.  Right atrium: A wave 11, V wave 9, mean 8  4.  Right ventricle: 38/9  5.  Pulmonary artery: 38/20, mean 32,   6  PCW: A wave 15 V wave 20, mean 8  7.  Aorta: 131/78, mean 102  8.  Left ventricle: 138/8     Cineangiography:  1.  Left main: The left main coronary artery is large and minimally calcified.  The artery bifurcates into the left anterior descending and left circumflex coronary arteries.  2.  LAD: The left anterior descending coronary artery is a large vessel that is moderately ectatic.  There is a 99% stenosis present in the midsegment of the artery.  The remainder of the vessel is ectatic but patent.  Some of the apical branches appear to be subtotally occluded.  The diagonal branches arising from the mid segment appeared to be of medium to small caliber.  The major diagonals also narrowed approximate 60%.  3.  LCx: The left circumflex coronary artery is a large codominant vessel.  The artery is diffusely ectatic throughout its course.  The artery is completely occluded distally prior to the origin of the terminal marginal branches.  The first marginal branch is of medium caliber arising from the mid segment without critical stenosis.  The second marginal branch arises distally it is of medium to large caliber and occluded at its terminal portion.  The third marginal branch arises distally and is occluded at its origin but fills via collateral circulation.  4.  RCA: The right coronary artery is diffusely diseased and ectatic throughout.  The mid segment of the artery is narrowed 60%.  The distal segment is narrowed 90%.  The posterior descending and posterolateral branches are medium caliber with a common trunk narrowing of 50%.  The right ventricular branch is  occluded at its origin but fills via collateral circulation.     Left ventriculography: The overall size of the ventricle is normal.  The global contractility of the ventricle is abnormal.  There is anterolateral as well as inferior hypokinesia.  The estimated ejection fraction is approximately 30%.     Assessment: Ischemic heart disease:  Etiology: Coronary atherosclerosis  Anatomy: Severe three-vessel coronary disease  Physiology: Exertional dyspnea, abnormal stress test  Functional status: Severely compromised  Prognosis: Guarded  Recommendation: Surgical consultation    Past Medical History:  Past Medical History:   Diagnosis Date   • AAA (abdominal aortic aneurysm) (CMS/[facility])    • Abnormal EKG    • Borderline diabetes    • BPH (benign prostatic hyperplasia)     w/Urinary Obstruction   • CAD (coronary artery disease)     S/p CABG 12/17/18   • Chronic pain of right lower extremity    • COPD (chronic obstructive pulmonary disease) (CMS/[facility])    • Dilated cardiomyopathy (CMS/[facility])    • CASTRO (dyspnea on exertion)    • Gastric ulcer     S/p Sx For Rupture   • Hyperlipidemia     Controlled w/Meds   • Hypertension     Controlled w/Meds   • Inguinal hernia     Hx Repair   • Metabolic disease    • Nocturia Hx   • Presbyopia    • RBBB (right bundle branch block)    • Right bundle branch block    • Right hip pain    • SOB (shortness of breath)     Associated w/CAD       Past Surgical History:   Procedure Laterality Date   • CARDIAC CATHETERIZATION N/A 12/14/2018    Procedure: Coronary angiography;  Surgeon: Fredis Collado MD;  Location: [facility] INVASIVE LOCATION;  Service: Cardiovascular   • CARDIAC CATHETERIZATION N/A 12/14/2018    Procedure: Left heart cath;  Surgeon: Fredis Collado MD;  Location: [facility] INVASIVE LOCATION;  Service: Cardiovascular   • CARDIAC CATHETERIZATION N/A 12/14/2018    Procedure: Left ventriculography;  Surgeon: Fredis Collado MD;  Location: [facility] INVASIVE LOCATION;   Service: Cardiovascular   • CARDIAC CATHETERIZATION N/A 12/14/2018    Procedure: Right heart cath;  Surgeon: Fredis Collado MD;  Location: [facility] CATH INVASIVE LOCATION;  Service: Cardiovascular   • COLONOSCOPY  05/2012    WNL   • CORONARY ARTERY BYPASS GRAFT N/A 12/17/2018    Procedure: INTRAOP ALEISHA; CORONARY ARTERY BYPASS X7 WITH LEFT INTERNAL MAMMARY ARTERY GRAFT AND UTILIZING ENDOSCOPICALLY HARVESTED LEFT SAPHENOUS VEIN; PRP;  Surgeon: Servando Diez MD;  Location: [facility] OR;  Service: Cardiothoracic   • INGUINAL HERNIA REPAIR     • OTHER SURGICAL HISTORY      For Ruptured Ulcer   • VASECTOMY         Home Medications:   Medications Prior to Admission   Medication Sig Dispense Refill Last Dose   • albuterol sulfate HFA (VENTOLIN HFA) 108 (90 Base) MCG/ACT inhaler Inhale 2 puffs Every 4 (Four) Hours As Needed for Wheezing or Shortness of Air. 1 inhaler 2 Past Week at Unknown time   • amiodarone (PACERONE) 200 MG tablet Take 200 mg by mouth 2 (Two) Times a Day.   1/6/2019 at Unknown time   • aspirin 81 MG EC tablet Take 81 mg by mouth daily.   1/6/2019 at Unknown time   • atorvastatin (LIPITOR) 40 MG tablet Take 1 tablet by mouth Every Night. 30 tablet 1 1/6/2019 at Unknown time   • budesonide-formoterol (SYMBICORT) 160-4.5 MCG/ACT inhaler Inhale 2 puffs 2 (Two) Times a Day. 1 inhaler 2 1/6/2019 at Unknown time   • carvedilol (COREG) 6.25 MG tablet Take 2 tablets by mouth Every 12 (Twelve) Hours.   1/6/2019 at Unknown time   • cholecalciferol (VITAMIN D3) 400 units tablet Take 400 Units by mouth Daily.   Past Week at Unknown time   • furosemide (LASIX) 80 MG tablet Take 1 tablet by mouth Daily. 30 tablet 0 1/6/2019 at Unknown time   • losartan (COZAAR) 25 MG tablet Take 1 tablet by mouth Daily. 60 tablet 0 1/6/2019 at Unknown time   • Multiple Vitamin (MULTIVITAMINS PO) Take 1 tablet by mouth daily.   1/6/2019 at Unknown time   • potassium chloride (K-DUR,KLOR-CON) 20 MEQ CR tablet Take 2 tablets by  mouth Daily. On days you take your furosemide (water pill) 30 tablet 11 1/6/2019 at Unknown time   • sennosides-docusate sodium (SENOKOT-S) 8.6-50 MG tablet Take 2 tablets by mouth Every Night. 60 tablet 0 1/6/2019 at Unknown time   • tamsulosin (FLOMAX) 0.4 MG capsule 24 hr capsule Take 1 capsule by mouth Daily. 90 capsule 3 1/6/2019 at Unknown time   • tiotropium (SPIRIVA HANDIHALER) 18 MCG per inhalation capsule Place 1 capsule into inhaler and inhale Daily. 30 capsule 2 1/6/2019 at Unknown time   • vitamin B-12 (CYANOCOBALAMIN) 1000 MCG tablet Take 1,000 mcg by mouth Daily.   1/6/2019 at Unknown time   • zolpidem (AMBIEN) 10 MG tablet Take 10 mg by mouth At Night As Needed for Sleep.   1/5/2019 at Unknown time       Allergies:  No Known Allergies    Past Social History:  Social History     Socioeconomic History   • Marital status: Single     Spouse name: Not on file   • Number of children: 3   • Years of education: Not on file   • Highest education level: Not on file   Social Needs   • Financial resource strain: Not on file   • Food insecurity - worry: Not on file   • Food insecurity - inability: Not on file   • Transportation needs - medical: Not on file   • Transportation needs - non-medical: Not on file   Occupational History   • Not on file   Tobacco Use   • Smoking status: Former Smoker     Types: Cigarettes   • Smokeless tobacco: Never Used   Substance and Sexual Activity   • Alcohol use: Yes     Alcohol/week: 3.6 oz     Types: 6 Cans of beer per week     Binge frequency: Weekly     Comment: 6 Beers/Wk   • Drug use: No   • Sexual activity: Defer     Comment: Vasectomy   Other Topics Concern   • Not on file   Social History Narrative   • Not on file        Past Family History:  Family History   Problem Relation Age of Onset   • Sudden death Mother    • Heart disease Father    • Heart attack Father    • Hypertension Father    • Diabetes Father    • Suicide Attempts Brother          Review of Systems:  Unable  to obtain at this time        Objective:     Objective:  Temp:  [96.9 °F (36.1 °C)-98.4 °F (36.9 °C)] 98.1 °F (36.7 °C)  Heart Rate:  [] 62  Resp:  [14-24] 21  BP: ()/() 134/89  FiO2 (%):  [40 %] 40 %  No intake or output data in the 24 hours ending 01/07/19 1127  Body mass index is 29.97 kg/m².      01/06/19  2100 01/07/19  0157   Weight: 105 kg (231 lb 9.6 oz) 100 kg (221 lb)           Physical Exam:   General Appearance Well developed, cooperative and well nourished and no acute distress   Head Normocephalic, without abnormality, atraumatic   Ears Ears appear intact with no abnormalities noted   Throat No oral lesions, no thrush, oral mucosa moist   Neck No adenopathy, supple, trachea midline, no thyromegaly, no carotid bruit, no JVD   Back No skin lesions, erythema or scars, no tenderness to percussion or palptaion,range of motion is normal   Lungs Clear to auscultation,respirations regular, even and unlabored   Heart Regular rhythm and normal rate, normal S1 and S2, no murmur, no gallop, no rub, no click   Chest wall No abnormalities observed   Abdomen Normal bowel sounds, no masses, no hepatomegaly,    Extremities Moves all extremities well, no edema, no cyanosis, no redness   Pulses Pulses palpable and equal bilaterally. Normal radial, carotid, femoral, dorsalis pedis and posterior tibial pulses bilaterally. Normal abdominal aorta   Skin No bleeding, bruising or rash   Psyhiatric Sedated, somulent       Lab Review:     Results from last 7 days   Lab Units  01/07/19   0941  01/07/19   0631  01/06/19   2156   SODIUM mmol/L  138  140  139   POTASSIUM mmol/L  5.9*  5.8*  5.3*   CHLORIDE mmol/L  97*  97*  94*   CO2 mmol/L  35.5*  36.5*  38.3*   BUN mg/dL  22  23  22   CREATININE mg/dL  1.22  1.36*  1.49*   GLUCOSE mg/dL  108*  109*  144*   CALCIUM mg/dL  9.0  9.4  9.6       Results from last 7 days   Lab Units  01/07/19   0631  01/06/19   2156   TROPONIN T ng/mL  0.181*  0.173*     Results from  last 7 days   Lab Units  01/07/19   0631   WBC 10*3/mm3  11.93*   HEMOGLOBIN g/dL  10.2*   HEMATOCRIT %  34.7*   PLATELETS 10*3/mm3  307     Results from last 7 days   Lab Units  01/06/19   2156   INR   1.30*   APTT seconds  31.8                 Results from last 7 days   Lab Units  01/07/19   0631   PROBNP pg/mL  5,516.0*               Imaging:    Imaging Results (most recent)     Procedure Component Value Units Date/Time    CT Head Without Contrast [[id number]] Collected:  01/06/19 2316     Updated:  01/06/19 2321    Narrative:       CRANIAL CT SCAN WITHOUT CONTRAST     CLINICAL HISTORY: Confusion/delirium, altered LOC, unexplained     COMPARISON: None.     TECHNIQUE: Radiation dose reduction techniques were utilized, including  automated exposure control and exposure modulation based on body size.  Multiple axial images of the head were obtained without contrast.      FINDINGS:  There are no abnormal areas of increased density or mass  effect. There are mild scattered areas of decreased density in the white  matter likely related to chronic ischemic gliotic changes.   Ventricles,  sulci, and cisterns appear normal. Bone window images are unremarkable.                Impression:       1. No acute intracranial abnormality.                     This report was finalized on 1/6/2019 11:17 PM by Anuj Solo M.D.       XR Chest 1 View [[id number]] Collected:  01/06/19 2201     Updated:  01/06/19 2205    Narrative:       PORTABLE CHEST X-RAY     CLINICAL HISTORY: ams     COMPARISON: January 4, 2019.     FINDINGS: Portable AP view of the chest was obtained with overlying  monitor leads in place. The lungs are very poorly aerated. Increasing  basilar opacities, likely atelectasis and effusions. Superimposed  pneumonia not excluded. Cardiac margins are obscured. Vascularity likely  normal considering poor inspiratory effort. The patient is status post  CABG.                Impression:       Expiratory radiograph with some  increase in basilar  opacities as discussed        This report was finalized on 1/6/2019 10:02 PM by Anuj Solo M.D.             Results for orders placed in visit on 12/17/18   Emergent/Open-Heart Anesthesia ALEISHA    Narrative Intra-Operative ALEISHA was performed by anesthesia.  Please see Intra-Op and   Anesthesia notes for documentation.       Telemetry      EKG:         BASELINE:       I personally viewed and interpreted the patient's EKG/Telemetry data.    Assessment:   1. Acute respiratory failure: per pulmonary  2. Elevated troponin: No acute ECG changes.  Unable to determine if pt has chest pain.  CLAUDIA: watch  Hyperkalemia: being treated  CAD: recent CABG.  LVEF 30%  Tobacco abuse: continues  CHF: Elevated BNP: LVEF 30%. Acute systolic.    Condition guarded.    Thank you for allowing me to participate in the care of Low Moorbety Lockwood. Feel free to contact me directly with any further questions or concerns.    Fredis Collado MD  [facility]  01/07/19  11:27 AM

## 2019-01-07 NOTE — NURSING NOTE
Pt admitted from ed. Initial sat 91on 5l high flow.  Pt unable to answer questions mostly unresponsive . Sl combative with care.At 0209 sat 56-66 % inc to 10l High flow. Rt called for stat blood gas. Sat remain 62-64 % NRB mask applied. Blood gases returned RRT called. Pt transferred to ICU. Andre Watkins

## 2019-01-07 NOTE — OUTREACH NOTE
CT Surgery Week 3 Survey      Responses   Facility patient discharged from?  [facility]   Does the patient have one of the following disease processes/diagnoses(primary or secondary)?  Cardiothoracic surgery   Week 3 attempt successful?  No   Revoke  Readmitted          Cher Melgar RN

## 2019-01-07 NOTE — PROGRESS NOTES
Discharge Planning Assessment  [facility]     Patient Name: Fredis Lockwood  MRN: [id number]  Today's Date: 1/7/2019    Admit Date: 1/6/2019    Discharge Needs Assessment     Row Name 01/07/19 1546       Living Environment    Lives With  alone    Current Living Arrangements  home/apartment/condo    Primary Care Provided by  self    Provides Primary Care For  no one    Family Caregiver if Needed  sibling(s)    Family Caregiver Names  Justine Torres HCS sister [facility] [phone]    Quality of Family Relationships  supportive    Able to Return to Prior Arrangements  other (see comments) TBD       Resource/Environmental Concerns    Resource/Environmental Concerns  none    Transportation Concerns  car, none       Transition Planning    Patient/Family Anticipates Transition to  home with help/services;inpatient rehabilitation facility    Patient/Family Anticipated Services at Transition  durable medical equipment;home health care;rehabilitation services    Transportation Anticipated  family or friend will provide       Discharge Needs Assessment    Readmission Within the Last 30 Days  current reason for admission unrelated to previous admission    Concerns to be Addressed  compliance issue;substance/tobacco abuse/use    Equipment Currently Used at Home  oxygen    Equipment Needed After Discharge  oxygen    Discharge Facility/Level of Care Needs  home with home health;rehabilitation facility    Offered/Gave Vendor List  yes    Current Discharge Risk  chronically ill;lives alone        Discharge Plan     Row Name 01/07/19 1555       Plan    Plan  Home with home health vs rehab    Patient/Family in Agreement with Plan  yes    Plan Comments  IMM 1/7/2019.  Met with patient and Justine Torres [facility] Care Surrogate/sister [phone] at bedside.  Patient was not arousable.  Face sheet verified.  Patient recently hospitalized for CABG and spent some time at his daughter's home to [facility] before return home.  Patient uses oxygen at  home, oxygen vendor is [employer], and patient does not any other special or adaptive equipment at home.  Patient has a living will and Justine Torres is his health care surrogate.  Discussed discharge plan.  Gave sister vendor list.  Will need to re-evaluate discharge needs once closer to discharge.  Will continue to monitor for new or changing discharge needs.        Destination      No service coordination in this encounter.      Durable Medical Equipment      No service coordination in this encounter.      Dialysis/Infusion      No service coordination in this encounter.      Home Medical Care      No service coordination in this encounter.      Community Resources      No service coordination in this encounter.          Demographic Summary     Row Name 01/07/19 1544       General Information    Admission Type  inpatient    Arrived From  emergency department    Required Notices Provided  Important Message from Medicare    Referral Source  admission list    Reason for Consult  discharge planning    Preferred Language  English     Used During This Interaction  no       Contact Information    Permission Granted to Share Info With  family/designee Justine Torres [facility] sister [phone]        Functional Status     Row Name 01/07/19 1545       Functional Status    Usual Activity Tolerance  moderate    Current Activity Tolerance  poor       Mental Status Summary    Recent Changes in Mental Status/Cognitive Functioning  unable to assess       Employment/    Employment Status  employed part time        Psychosocial    No documentation.       Abuse/Neglect    No documentation.       Legal    No documentation.       Substance Abuse    No documentation.       Patient Forms    No documentation.           Angie Zendejas RN

## 2019-01-07 NOTE — CONSULTS
[facility]  Phone: [phone]  Les Sanchez MD      Subjective   LOS: 1 day     Thank you for this consultation.  68-year-old male admitted to the cardiology team for elevated troponin.  Recent CABG on 12/17/18.  Patient had decreased level of consciousness upon admission in the ED.  Patient had initial saturations in the 50s and 60s on room air in the ED.  Family informs me that patient had low saturations with primary care physician this past Friday as well.  On a nonrebreather saturations improved to the 90s.  Patient was given Narcan but no improvement.  It looks like in the blood gas was not done in the ED.  On the floors patient became further obtunded.  Rapid was called and patient was brought down to the ICU.  Blood gas showed severe hypercapnic respiratory failure and patient was placed on a BiPAP.  As patient was restless he was requiring a Precedex drip.  His subsequent blood gas did show some improvement.  He has known severe COPD and known hypercapnia.  Family states no recent chest pains.  From the cardiac standpoint he was doing well.  Unfortunately he continues to smoke.  Underlying hypertension and hyperlipidemia.  Patient has known low EF.    Fredis Lockwood  reports that he drinks about 3.6 oz of alcohol per week.,  reports that he has quit smoking. His smoking use included cigarettes. he has never used smokeless tobacco.     Past Hx:  has a past medical history of AAA (abdominal aortic aneurysm) (CMS/HCC), Abnormal EKG, Borderline diabetes, BPH (benign prostatic hyperplasia), CAD (coronary artery disease), Chronic pain of right lower extremity, COPD (chronic obstructive pulmonary disease) (CMS/HCC), Dilated cardiomyopathy (CMS/HCC), CASTRO (dyspnea on exertion), Gastric ulcer, Hyperlipidemia, Hypertension, Inguinal hernia, Metabolic disease, Nocturia (Hx), Presbyopia, RBBB (right bundle branch block), Right bundle branch block, Right hip pain, and SOB (shortness of  breath).  Surg Hx:  has a past surgical history that includes Colonoscopy (05/2012); Vasectomy; Other surgical history; Cardiac catheterization (N/A, 12/14/2018); Cardiac catheterization (N/A, 12/14/2018); Cardiac catheterization (N/A, 12/14/2018); Cardiac catheterization (N/A, 12/14/2018); Coronary artery bypass graft (N/A, 12/17/2018); and Inguinal hernia repair.  FH: family history includes Diabetes in his father; Heart attack in his father; Heart disease in his father; Hypertension in his father; Sudden death in his mother; Suicide Attempts in his brother.  SH:  reports that he has quit smoking. His smoking use included cigarettes. he has never used smokeless tobacco. He reports that he drinks about 3.6 oz of alcohol per week. He reports that he does not use drugs.    Medications Prior to Admission   Medication Sig Dispense Refill Last Dose   • albuterol sulfate HFA (VENTOLIN HFA) 108 (90 Base) MCG/ACT inhaler Inhale 2 puffs Every 4 (Four) Hours As Needed for Wheezing or Shortness of Air. 1 inhaler 2 Past Week at Unknown time   • amiodarone (PACERONE) 200 MG tablet Take 200 mg by mouth 2 (Two) Times a Day.   1/6/2019 at Unknown time   • aspirin 81 MG EC tablet Take 81 mg by mouth daily.   1/6/2019 at Unknown time   • atorvastatin (LIPITOR) 40 MG tablet Take 1 tablet by mouth Every Night. 30 tablet 1 1/6/2019 at Unknown time   • budesonide-formoterol (SYMBICORT) 160-4.5 MCG/ACT inhaler Inhale 2 puffs 2 (Two) Times a Day. 1 inhaler 2 1/6/2019 at Unknown time   • carvedilol (COREG) 6.25 MG tablet Take 2 tablets by mouth Every 12 (Twelve) Hours.   1/6/2019 at Unknown time   • cholecalciferol (VITAMIN D3) 400 units tablet Take 400 Units by mouth Daily.   Past Week at Unknown time   • furosemide (LASIX) 80 MG tablet Take 1 tablet by mouth Daily. 30 tablet 0 1/6/2019 at Unknown time   • losartan (COZAAR) 25 MG tablet Take 1 tablet by mouth Daily. 60 tablet 0 1/6/2019 at Unknown time   • Multiple Vitamin (MULTIVITAMINS  PO) Take 1 tablet by mouth daily.   2019 at Unknown time   • potassium chloride (K-DUR,KLOR-CON) 20 MEQ CR tablet Take 2 tablets by mouth Daily. On days you take your furosemide (water pill) 30 tablet 11 2019 at Unknown time   • sennosides-docusate sodium (SENOKOT-S) 8.6-50 MG tablet Take 2 tablets by mouth Every Night. 60 tablet 0 2019 at Unknown time   • tamsulosin (FLOMAX) 0.4 MG capsule 24 hr capsule Take 1 capsule by mouth Daily. 90 capsule 3 2019 at Unknown time   • tiotropium (SPIRIVA HANDIHALER) 18 MCG per inhalation capsule Place 1 capsule into inhaler and inhale Daily. 30 capsule 2 2019 at Unknown time   • vitamin B-12 (CYANOCOBALAMIN) 1000 MCG tablet Take 1,000 mcg by mouth Daily.   2019 at Unknown time   • zolpidem (AMBIEN) 10 MG tablet Take 10 mg by mouth At Night As Needed for Sleep.   2019 at Unknown time     No Known Allergies    Review of Systems   Unable to perform ROS: Mental status change     Vital Signs past 24hrs  BP range: BP: ()/() 91/60  Pulse range: Heart Rate:  [] 60  Resp rate range: Resp:  [16-24] 24  Temp range: Temp (24hrs), Av.7 °F (36.5 °C), Min:96.9 °F (36.1 °C), Max:98.4 °F (36.9 °C)    Oxygen range: SpO2:  [59 %-100 %] 94 %; Flow (L/min):  [5-15] 15;   Device (Oxygen Therapy): NPPV/NIV  100 kg (221 lb); Body mass index is 29.97 kg/m².  No intake/output data recorded.    Adult male who is somewhat responsive.  He mumbles to questions.  Responsiveness is intermittent.  Initially on a BiPAP.  However switched to avaps.  Was requiring Precedex but switched off due to intermittent mental status.  Pupils are reactive to light and equal.  Neck is large and unable to assess JVP.  Patient has a noninvasive mask on.  Lungs reveal poor air entry and mild expiratory wheeze.  Difficult to assess if any crackles.  I do not hear any definite rales or crackles.  Percussion note resonant bilaterally.  Extremities show at least 1+ edema.  Heart  examination S1-S2 present and regular rhythm.  Recent sternotomy scar is healing well.  Abdomen is morbidly obese, soft, nontender.  Bowel sounds present.  No peripheral cyanosis clubbing.  When awake patient is able to move all 4 extremities and is nonfocal.  No obvious adenopathy.    Results Review:    I have reviewed the laboratory and imaging data from current admission. My annotations are as noted in assessment and plan.    Medication Review:  I have reviewed the current MAR. My annotations are as noted in assessment and plan.    Plan   PCCM Problems  Acute on chronic respiratory failure, NIV  CO2 Narcosis  Elevated troponin  CLAUDIA  Severe COPD with exacerbation  Chronic elevated right hemidiaphragm  Current smoker  SHAHZAD/OHV, pending study  Relevant Medical Diagnoses  CABG recent, 12/17/18  CAD  Nocturnal hypoxia  EF 24%    Plan of Treatment  Acute on chronic respiratory failure requiring use of noninvasive ventilation.  Continue with same.  Try to avoid intubation given severity of underlying COPD.  Patient was switched to avaps and repeat ABG will be obtained.  He likely requires a Trilogy for home.    Mental status appears to be depressed due to CO2 narcosis.  It improves when patient's ventilation is adequate.    Elevated troponin in setting of recent CABG.  Cardiology will see.    Acute kidney injury noted.  Unclear if due to diuretics.  Potassium is also somewhat elevated.  He does have 1+ edema lower extremities.  I would be concerned about giving additional diuretics at this point.  Watch renal function closely.    Severe COPD with exacerbation.  Treat with steroids and nebs.  I do hear some wheezing.  Exam was overall limited.    This patient must quit smoking.  He has had risk of early death if he does not do so.    Spoke with family at bedside and explained to them that intubation might be necessary but we will try our best to avoid.  They understand but wished for us to proceed with intubation if it  becomes necessary.    I spent +45 mins critical care time in care of this patient outside of any procedures.     Part of this note may be an electronic transcription/translation of spoken language to printed text using the Dragon Dictation System.

## 2019-01-07 NOTE — PROGRESS NOTES
"      [facility]         Dr Turner Sayied   LOS: 1 day   Patient Care Team:  Jus Durham MD as PCP - General (Family Medicine)    Chief Complaint: Acute on chronic respiratory failure with CO2 narcosis COPD exacerbation and acute hyperkalemia    Interval History: Events noted chart reviewed.  I was called in to evaluate patient with persistent hyperkalemia on BiPAP.  Patient wakes up and following some simple commands.  Tolerating BiPAP well.    REVIEW OF SYSTEMS:   Limited on BiPAP.  Ventilator/Non-Invasive Ventilation Settings (From admission, onward)    Start     Ordered    01/07/19 0603  NIPPV (CPAP or BIPAP)  Until Discontinued     Question Answer Comment   Indication: Acute Respiratory Failure    Type: AVAPS/PC/PS    NIPPV Mask Interface Full face mask    Backup Rate 10    Target VT (mL) 500    EPAP/PEEP (cm H2O) 6    Min Pressure (cm H2O) 10    Max Pressure (cm H2O) 30    Titrate for SPO2 88% - 92%        01/07/19 0603    01/07/19 0305  NIPPV (CPAP or BIPAP)  Until Discontinued,   Status:  Canceled     Question Answer Comment   Indication: Acute Respiratory Failure    Type: BIPAP    NIPPV Mask Interface Full face mask    IPAP 24    EPAP 7    Oxygen FIO2    FIO2 % 40    Tidal Volume 500    Breath Rate 24    Titrate for SPO2 88% - 92%        01/07/19 0306            Vital Signs  Temp:  [96.9 °F (36.1 °C)-98.4 °F (36.9 °C)] 98.1 °F (36.7 °C)  Heart Rate:  [] 63  Resp:  [11-24] 11  BP: ()/() 125/75  FiO2 (%):  [40 %] 40 %  No intake or output data in the 24 hours ending 01/07/19 1224  Flowsheet Rows      First Filed Value   Admission Height  182.9 cm (72\") Documented at 01/06/2019 2100   Admission Weight  105 kg (231 lb 9.6 oz) Documented at 01/06/2019 2100          Physical Exam:   General Appearance:    On BiPAP tolerating well.  Following simple commands    Lungs:    Diminished breath sounds with rhonchi and wheeze bilaterally     Heart:    Regular rhythm and normal rate, " normal S1 and S2, no            murmur, no gallop, no rub, no click   Chest Wall:    No abnormalities observed   Abdomen:     Normal bowel sounds, no masses, no organomegaly, soft        non-tender, non-distended, no guarding, no rebound                tenderness   Extremities:   Moves all extremities well, trace edema, no cyanosis, no             redness     Results Review:        Results from last 7 days   Lab Units  01/07/19   0941  01/07/19   0631  01/06/19   2156   SODIUM mmol/L  138  140  139   POTASSIUM mmol/L  5.9*  5.8*  5.3*   CHLORIDE mmol/L  97*  97*  94*   CO2 mmol/L  35.5*  36.5*  38.3*   BUN mg/dL  22  23  22   CREATININE mg/dL  1.22  1.36*  1.49*   GLUCOSE mg/dL  108*  109*  144*   CALCIUM mg/dL  9.0  9.4  9.6     Results from last 7 days   Lab Units  01/07/19   0631  01/06/19   2156   TROPONIN T ng/mL  0.181*  0.173*     Results from last 7 days   Lab Units  01/07/19   0631  01/06/19   2156  01/04/19   1154   WBC 10*3/mm3  11.93*  12.59*  11.20*   HEMOGLOBIN g/dL  10.2*  9.9*  9.9*   HEMATOCRIT %  34.7*  33.3*  33.2*   PLATELETS 10*3/mm3  307  359  419     Results from last 7 days   Lab Units  01/06/19   2156   INR   1.30*   APTT seconds  31.8                 Results from last 7 days   Lab Units  01/07/19   0737   PH, ARTERIAL pH units  7.301*   PO2 ART mm Hg  86.2   PCO2, ARTERIAL mm Hg  81.2*   HCO3 ART mmol/L  40.0*       I reviewed the patient's new clinical results.  I personally viewed and interpreted the patient's CXR        Medication Review:     heparin (porcine) 5,000 Units Subcutaneous Q8H   insulin lispro 0-20 Units Subcutaneous 4x Daily With Meals & Nightly   ipratropium-albuterol 3 mL Nebulization Q4H - RT   methylPREDNISolone sodium succinate 40 mg Intravenous Q8H         dexmedetomidine 0.2-1.5 mcg/kg/hr Last Rate: 0.2 mcg/kg/hr (01/07/19 1159)   Sodium chloride 100 mL/hr Last Rate: 100 mL/hr (01/07/19 3839)       ASSESSMENT:   PCCM Problems  Acute on chronic respiratory failure,  NIV  CO2 Narcosis  Elevated troponin  CLAUDIA with severe hyperkalemia  Severe COPD with exacerbation  Chronic elevated right hemidiaphragm  Current smoker  SHAHZAD/OHV, pending study  Relevant Medical Diagnoses  CABG recent, 12/17/18  CAD  Nocturnal hypoxia  EF 24%          PLAN:  Continue BiPAP for now  Continue steroids and bronchodilators  ABGs improving on BiPAP  Hyperkalemia unclear etiology.  It seems to be getting worse.  I started patient on some fluids and taken some temporizing measures with insulin and glucose.  If persistently elevated may consider renal consult  Gentle IV fluids and monitor.  Holding diuretics at this time  Discussed with family at bedside  Monitor closely    Johnny Betts MD  01/07/19  12:24 PM      Time: Critical care 35 min

## 2019-01-07 NOTE — ED PROVIDER NOTES
EMERGENCY DEPARTMENT ENCOUNTER    CHIEF COMPLAINT  Chief Complaint: Altered mental status   History given by: EMS  History limited by: AMS  Room Number: I385/1  PMD: Jus Durham MD      HPI:  Pt is a 68 y.o. male who presents via EMS for evaluation of AMS. EMS provides the HPI. They report that the pt's family checked on him tonight, and found him to have a decreased level of consciousness. Pt would awaken and answer questions, but would drift off quickly. Time of onset is unknown. EMS also reports that the pt had initial sats in the 50's-60's on RA, which improved to 90's on NRB. Pt was given Narcan by EMS without improvement. Pt also had CABG on 12/4/18. Remainder of HPI is limited.     Duration:  unknown  Onset: unknown  Timing: unknown  Location: neuro  Radiation: n/a  Quality: decreased LOC  Intensity/Severity: moderate  Progression: unchanged   Associated Symptoms: hypoxia   Aggravating Factors: unknown  Alleviating Factors: unknown  Previous Episodes: unknown    PAST MEDICAL HISTORY  Active Ambulatory Problems     Diagnosis Date Noted   • Benign essential HTN 05/20/2016   • Benign prostatic hyperplasia with urinary obstruction 05/20/2016   • ED (erectile dysfunction) of non-organic origin 05/20/2016   • Elevated cholesterol 05/20/2016   • Borderline diabetes 05/20/2016   • Bundle branch block, right 05/20/2016   • Avitaminosis D 05/20/2016   • Tobacco abuse 05/20/2016   • Nocturia more than twice per night 11/30/2016   • Health care maintenance 11/30/2016   • Medicare annual wellness visit, initial 01/31/2018   • Shortness of breath 01/31/2018   • Onychomycosis 01/31/2018   • Abnormal EKG 10/25/2018   • Need for influenza vaccination 10/25/2018   • Pain of right hip joint 10/25/2018   • Panlobular emphysema (CMS/HCC) 10/25/2018   • Dilated cardiomyopathy (CMS/HCC) 12/07/2018   • Coronary artery disease involving native coronary artery of native heart with angina pectoris (CMS/HCC) 12/07/2018   • S/P  CABG (coronary artery bypass graft) 12/17/2018   • Ischemic cardiomyopathy 12/31/2018   • Abdominal aortic aneurysm (AAA) without rupture (CMS/[facility]) 12/31/2018   • COPD (chronic obstructive pulmonary disease) (CMS/[facility]) 12/31/2018   • Nocturnal hypoxemia 12/31/2018   • Hypoxia 01/04/2019   • Anemia 01/04/2019     Resolved Ambulatory Problems     Diagnosis Date Noted   • Below normal amount of sodium in the blood 05/20/2016   • Stable angina (CMS/[facility]) 12/07/2018   • Abnormal stress test 12/07/2018     Past Medical History:   Diagnosis Date   • AAA (abdominal aortic aneurysm) (CMS/[facility])    • Abnormal EKG    • Borderline diabetes    • BPH (benign prostatic hyperplasia)    • CAD (coronary artery disease)    • Chronic pain of right lower extremity    • COPD (chronic obstructive pulmonary disease) (CMS/[facility])    • Dilated cardiomyopathy (CMS/[facility])    • CASTRO (dyspnea on exertion)    • Gastric ulcer    • Hyperlipidemia    • Hypertension    • Inguinal hernia    • Metabolic disease    • Nocturia Hx   • Presbyopia    • RBBB (right bundle branch block)    • Right bundle branch block    • Right hip pain    • SOB (shortness of breath)        PAST SURGICAL HISTORY  Past Surgical History:   Procedure Laterality Date   • CARDIAC CATHETERIZATION N/A 12/14/2018    Procedure: Coronary angiography;  Surgeon: Fredis Collado MD;  Location[facility];  Service: Cardiovascular   • CARDIAC CATHETERIZATION N/A 12/14/2018    Procedure: Left heart cath;  Surgeon: Fredis Collado MD;  Location: [facility];  Service: Cardiovascular   • CARDIAC CATHETERIZATION N/A 12/14/2018    Procedure: Left ventriculography;  Surgeon: Fredis Collado MD;  Location: [facility]ATH [facility] LOC[facility];  Service: Cardiovascular   • CARDIAC CATHETERIZATION N/A 12/14/2018    Procedure: Right heart cath;  Surgeon: Fredis Collado MD;  Location: [facility];  Service: Cardiovascular   • COLONOSCOPY   05/2012    WNL   • CORONARY ARTERY BYPASS GRAFT N/A 12/17/2018    Procedure: INTRAOP ALEISHA; CORONARY ARTERY BYPASS X7 WITH LEFT INTERNAL MAMMARY ARTERY GRAFT AND UTILIZING ENDOSCOPICALLY HARVESTED LEFT SAPHENOUS VEIN; PRP;  Surgeon: Servando Diez MD;  Location: [facility];  Service: Cardiothoracic   • INGUINAL HERNIA REPAIR     • OTHER SURGICAL HISTORY      For Ruptured Ulcer   • VASECTOMY         FAMILY HISTORY  Family History   Problem Relation Age of Onset   • Sudden death Mother    • Heart disease Father    • Heart attack Father    • Hypertension Father    • Diabetes Father    • Suicide Attempts Brother        SOCIAL HISTORY  Social History     Socioeconomic History   • Marital status: Single     Spouse name: Not on file   • Number of children: 3   • Years of education: Not on file   • Highest education level: Not on file   Social Needs   • Financial resource strain: Not on file   • Food insecurity - worry: Not on file   • Food insecurity - inability: Not on file   • Transportation needs - medical: Not on file   • Transportation needs - non-medical: Not on file   Occupational History   • Not on file   Tobacco Use   • Smoking status: Former Smoker     Types: Cigarettes   • Smokeless tobacco: Never Used   Substance and Sexual Activity   • Alcohol use: Yes     Alcohol/week: 3.6 oz     Types: 6 Cans of beer per week     Binge frequency: Weekly     Comment: 6 Beers/Wk   • Drug use: No   • Sexual activity: Defer     Comment: Vasectomy   Other Topics Concern   • Not on file   Social History Narrative   • Not on file       ALLERGIES  Patient has no known allergies.    REVIEW OF SYSTEMS  Review of Systems   Unable to perform ROS: Mental status change       PHYSICAL EXAM  ED Triage Vitals [01/06/19 2100]   Temp Heart Rate Resp BP SpO2   96.9 °F (36.1 °C) 85 20 142/88 (!) 78 %      Temp src Heart Rate Source Patient Position BP Location FiO2 (%)   Tympanic -- Lying Right arm --     Physical Exam   Constitutional:  No distress.   HENT:   Head: Normocephalic and atraumatic.   Eyes: EOM are normal. Pupils are equal, round, and reactive to light.   Neck: Normal range of motion. Neck supple.   Cardiovascular: Normal rate, regular rhythm and normal heart sounds.   Pulmonary/Chest: Effort normal and breath sounds normal. No respiratory distress.   Abdominal: Soft. There is no tenderness. There is no rebound and no guarding.   Musculoskeletal: Normal range of motion. He exhibits no edema.   Neurological: He has normal strength and intact cranial nerves.   Disoriented to time and place.    Skin: Skin is warm and dry.   Psychiatric: Mood normal.   Nursing note and vitals reviewed.      LAB RESULTS  Lab Results (last 24 hours)     Procedure Component Value Units Date/Time    POC Glucose Once [[id number]]  (Abnormal) Collected:  01/07/19 2057    Specimen:  Blood Updated:  01/07/19 2100     Glucose 152 mg/dL     CBC & Differential [[id number]] Collected:  01/08/19 0436    Specimen:  Blood Updated:  01/08/19 0602    Narrative:       The following orders were created for panel order CBC & Differential.  Procedure                               Abnormality         Status                     ---------                               -----------         ------                     Scan Slide[[id number]]                                       Final result               CBC Auto Differential[[id number]]        Abnormal            Final result                 Please view results for these tests on the individual orders.    Basic Metabolic Panel [[id number]]  (Abnormal) Collected:  01/08/19 0436    Specimen:  Blood Updated:  01/08/19 0530     Glucose 155 mg/dL      BUN 26 mg/dL      Creatinine 1.14 mg/dL      Sodium 142 mmol/L      Potassium 4.8 mmol/L      Chloride 96 mmol/L      CO2 35.0 mmol/L      Calcium 9.2 mg/dL      eGFR Non African Amer 64 mL/min/1.73      BUN/Creatinine Ratio 22.8     Anion Gap 11.0 mmol/L     Narrative:       GFR Normal  >60  Chronic Kidney Disease <60  Kidney Failure <15    CBC Auto Differential [[id number]]  (Abnormal) Collected:  01/08/19 0436    Specimen:  Blood Updated:  01/08/19 0602     WBC 8.73 10*3/mm3      RBC 3.53 10*6/mm3      Hemoglobin 10.5 g/dL      Hematocrit 34.7 %      MCV 98.3 fL      MCH 29.7 pg      MCHC 30.3 g/dL      RDW 16.9 %      RDW-SD 59.8 fl      MPV 10.3 fL      Platelets 236 10*3/mm3      Neutrophil % 85.5 %      Lymphocyte % 10.8 %      Monocyte % 3.4 %      Eosinophil % 0.3 %      Basophil % 0.0 %      Immature Grans % 0.5 %      Neutrophils, Absolute 7.46 10*3/mm3      Lymphocytes, Absolute 0.94 10*3/mm3      Monocytes, Absolute 0.30 10*3/mm3      Eosinophils, Absolute 0.03 10*3/mm3      Basophils, Absolute 0.00 10*3/mm3      Immature Grans, Absolute 0.04 10*3/mm3     Scan Slide [[id number]] Collected:  01/08/19 0436    Specimen:  Blood Updated:  01/08/19 0602    POC Glucose Once [[id number]]  (Abnormal) Collected:  01/08/19 0745    Specimen:  Blood Updated:  01/08/19 0753     Glucose 145 mg/dL     POC Glucose Once [[id number]]  (Abnormal) Collected:  01/08/19 1128    Specimen:  Blood Updated:  01/08/19 1201     Glucose 160 mg/dL     POC Glucose Once [[id number]]  (Abnormal) Collected:  01/08/19 1721    Specimen:  Blood Updated:  01/08/19 1724     Glucose 151 mg/dL           I ordered the above labs and reviewed the results    RADIOLOGY  CT Head Without Contrast   Final Result   1. No acute intracranial abnormality.                           This report was finalized on 1/6/2019 11:17 PM by Anuj Solo M.D.          XR Chest 1 View   Final Result   Expiratory radiograph with some increase in basilar   opacities as discussed           This report was finalized on 1/6/2019 10:02 PM by Anuj Solo M.D.               I ordered the above noted radiological studies. Interpreted by radiologist. Reviewed by me in PACS.       PROCEDURES  Procedures    EKG          EKG time: 21:40  Rhythm/Rate: NSR 86  P  waves and HI: nml  QRS, axis: RAD, RBBB   ST and T waves: nml     Interpreted Contemporaneously by me, independently viewed  Unchanged compared to prior 12/19/18    PROGRESS AND CONSULTS       21:40  BP- 142/88 HR- 85 Temp- 96.9 °F (36.1 °C) (Tympanic) O2 sat- (!) 78%  Initial evaluation of pt. Plan for labs and CT head.    21:46  CXR, EKG, CT head, lactic acid, CMP, CBC, APTT, UA, troponin, and BNP.     22:42  Call placed to cardiology for consult.     22:58  Discussed pt's case with Dr Brown (cardiology), who agrees to admit the pt.     23:05  BP- 152/89 HR- 79 Temp- 96.9 °F (36.1 °C) (Tympanic) O2 sat- 90%  Rechecked the patient who is in NAD and is resting comfortably.   Informed family that the initial workup shows an elevated troponin, and pt had low O2 sats on arrival to the ED. Informed her of the plan for CT head, and for admission. She understands and agrees with the plan, all questions answered.    MEDICAL DECISION MAKING  Results were reviewed/discussed with the patient and they were also made aware of online access. Pt also made aware that some labs, such as cultures, will not be resulted during ER visit and follow up with PMD is necessary.     MDM  Number of Diagnoses or Management Options     Amount and/or Complexity of Data Reviewed  Clinical lab tests: ordered and reviewed (Troponin=0.173)  Tests in the radiology section of CPT®: ordered and reviewed (CXR shows bibasilar opacities. CT head shows NAD)  Tests in the medicine section of CPT®: ordered and reviewed (See EKG procedure note. )  Decide to obtain previous medical records or to obtain history from someone other than the patient: yes  Review and summarize past medical records: yes (Pt had 3 vessel CABG on 12/17/18)  Discuss the patient with other providers: yes (Dr Brown, cardiology)    Patient Progress  Patient progress: stable         IV tPA considered but not indicated due to unknown onset of sx.     DIAGNOSIS  Final diagnoses:   Altered mental  status, unspecified altered mental status type   Elevated troponin   Hypoxemia       DISPOSITION  ADMISSION    Discussed treatment plan and reason for admission with pt/family and admitting physician.  Pt/family voiced understanding of the plan for admission for further testing/treatment as needed.     Latest Documented Vital Signs:  As of 8:48 PM  BP- 141/78 HR- 72 Temp- 98.9 °F (37.2 °C) (Oral) O2 sat- 93%    --  Documentation assistance provided by naa Redding for Dr Srivastava.  Information recorded by the scribe was done at my direction and has been verified and validated by me.        Radha Redding  01/06/19 3030       Arcadio Srivastava MD  01/08/19 0458

## 2019-01-07 NOTE — PLAN OF CARE
Problem: Patient Care Overview  Goal: Plan of Care Review  Outcome: Ongoing (interventions implemented as appropriate)   01/07/19 7312   OTHER   Outcome Summary Neuro status improved throughout the day. Now is oriented to person and place. Tolerated short breaks off BiPAP for oral care only. C/O shortness of breath. lasix IV given. Condom cath in place after straight cath x1. He is now voiding spontaneously.  Potassium is trending down after insulin/D50 treatment.  BMP pending.......  To call results to intensivist.     Goal: Individualization and Mutuality  Outcome: Ongoing (interventions implemented as appropriate)    Goal: Discharge Needs Assessment  Outcome: Ongoing (interventions implemented as appropriate)    Goal: Interprofessional Rounds/Family Conf  Outcome: Ongoing (interventions implemented as appropriate)      Problem: Skin Injury Risk (Adult)  Goal: Identify Related Risk Factors and Signs and Symptoms  Outcome: Ongoing (interventions implemented as appropriate)    Goal: Skin Health and Integrity  Outcome: Ongoing (interventions implemented as appropriate)      Problem: Fall Risk (Adult)  Goal: Identify Related Risk Factors and Signs and Symptoms  Outcome: Ongoing (interventions implemented as appropriate)    Goal: Absence of Fall  Outcome: Ongoing (interventions implemented as appropriate)      Problem: Chronic Obstructive Pulmonary Disease (Adult)  Goal: Signs and Symptoms of Listed Potential Problems Will be Absent, Minimized or Managed (Chronic Obstructive Pulmonary Disease)  Outcome: Ongoing (interventions implemented as appropriate)

## 2019-01-07 NOTE — SIGNIFICANT NOTE
01/07/19 1445   Rehab Time/Intention   Evaluation Not Performed unable to evaluate, medical status change  (Discussed pt with RN. Per RN, pt is making slow improvements, however, not yet appropriate for being off Bipap for extended periods. ST will f/u 1/8 to determine if pt is appropriate for Bedside Swallow.)   Rehab Treatment   Discipline speech language pathologist

## 2019-01-08 NOTE — PROGRESS NOTES
"[facility] PULMONARY CARE         Dr Turner Sayied   LOS: 2 days   Patient Care Team:  Jus Durham MD as PCP - General (Family Medicine)    Chief Complaint: Acute on chronic respiratory failure with CO2 narcosis COPD exacerbation and acute hyperkalemia    Interval History: Overnight was having issues with respirator distress.  IV fluids was discontinued and patient given diuretics.  Excellent diuresis noted.  Currently off BiPAP more awake and following some simple commands.  Remains a little confused.  Overnight was agitated and currently onx    REVIEW OF SYSTEMS:   Limited due to confusion.  And on Precedex  Ventilator/Non-Invasive Ventilation Settings (From admission, onward)    Start     Ordered    01/07/19 0603  NIPPV (CPAP or BIPAP)  Until Discontinued     Question Answer Comment   Indication: Acute Respiratory Failure    Type: AVAPS/PC/PS    NIPPV Mask Interface Full face mask    Backup Rate 10    Target VT (mL) 500    EPAP/PEEP (cm H2O) 6    Min Pressure (cm H2O) 10    Max Pressure (cm H2O) 30    Titrate for SPO2 88% - 92%        01/07/19 0603    01/07/19 0305  NIPPV (CPAP or BIPAP)  Until Discontinued,   Status:  Canceled     Question Answer Comment   Indication: Acute Respiratory Failure    Type: BIPAP    NIPPV Mask Interface Full face mask    IPAP 24    EPAP 7    Oxygen FIO2    FIO2 % 40    Tidal Volume 500    Breath Rate 24    Titrate for SPO2 88% - 92%        01/07/19 0306            Vital Signs  Temp:  [98.4 °F (36.9 °C)-99 °F (37.2 °C)] 98.4 °F (36.9 °C)  Heart Rate:  [51-69] 54  Resp:  [14-25] 19  BP: (113-179)/() 165/90  FiO2 (%):  [40 %] 40 %    Intake/Output Summary (Last 24 hours) at 1/8/2019 1145  Last data filed at 1/7/2019 2353  Gross per 24 hour   Intake 680 ml   Output 2875 ml   Net -2195 ml     Flowsheet Rows      First Filed Value   Admission Height  182.9 cm (72\") Documented at 01/06/2019 2100   Admission Weight  105 kg (231 lb 9.6 oz) Documented at 01/06/2019 2100    "       Physical Exam:   General Appearance:    Off BiPAP on Precedex.  Wakes up follow simple commands.     Lungs:    Diminished breath sounds with rhonchi and wheeze bilaterally     Heart:    Regular rhythm and normal rate, normal S1 and S2, no            murmur, no gallop, no rub, no click   Chest Wall:    No abnormalities observed   Abdomen:     Normal bowel sounds, no masses, no organomegaly, soft        non-tender, non-distended, no guarding, no rebound                tenderness   Extremities:   Moves all extremities well, trace edema, no cyanosis, no             redness     Results Review:        Results from last 7 days   Lab Units  01/08/19   0436  01/07/19   1807  01/07/19   1400   SODIUM mmol/L  142  138  139   POTASSIUM mmol/L  4.8  5.3*  5.3*   CHLORIDE mmol/L  96*  95*  97*   CO2 mmol/L  35.0*  34.0*  35.3*   BUN mg/dL  26*  24*  22   CREATININE mg/dL  1.14  1.16  1.17   GLUCOSE mg/dL  155*  133*  133*   CALCIUM mg/dL  9.2  9.3  9.0     Results from last 7 days   Lab Units  01/07/19   0631  01/06/19   2156   TROPONIN T ng/mL  0.181*  0.173*     Results from last 7 days   Lab Units  01/08/19   0436  01/07/19   0631  01/06/19   2156   WBC 10*3/mm3  8.73  11.93*  12.59*   HEMOGLOBIN g/dL  10.5*  10.2*  9.9*   HEMATOCRIT %  34.7*  34.7*  33.3*   PLATELETS 10*3/mm3  236  307  359     Results from last 7 days   Lab Units  01/06/19   2156   INR   1.30*   APTT seconds  31.8                 Results from last 7 days   Lab Units  01/07/19   0737   PH, ARTERIAL pH units  7.301*   PO2 ART mm Hg  86.2   PCO2, ARTERIAL mm Hg  81.2*   HCO3 ART mmol/L  40.0*       I reviewed the patient's new clinical results.  I personally viewed and interpreted the patient's CXR        Medication Review:     docusate sodium 100 mg Oral BID   heparin (porcine) 5,000 Units Subcutaneous Q8H   insulin lispro 0-20 Units Subcutaneous 4x Daily With Meals & Nightly   ipratropium-albuterol 3 mL Nebulization Q4H - RT   methylPREDNISolone sodium  succinate 40 mg Intravenous Q8H   QUEtiapine 25 mg Oral Nightly         dexmedetomidine 0.2-1.5 mcg/kg/hr Last Rate: 0.8 mcg/kg/hr (01/08/19 0814)       ASSESSMENT:   PCCM Problems  Acute on chronic respiratory failure, NIV  CO2 Narcosis  Elevated troponin  CLAUDIA with severe hyperkalemia  Severe COPD with exacerbation  Chronic elevated right hemidiaphragm  Current smoker  SHAHZAD/OHV, pending study  Relevant Medical Diagnoses  CABG recent, 12/17/18  CAD  Nocturnal hypoxia  EF 24%          PLAN:  Wean off BiPAP as tolerated.  Use BiPAP as needed and with sleep  Continue steroids and bronchodilators.  Wean steroid as tolerated  ABGs improving on BiPAP  Hyperkalemia improved post diuresis.  We will hold off IV fluids for now  Echo with EF 25%.  I will resume low-dose diuretics    no evidence to suggest infection as such  Agitation overnight on Precedex.  We will wean Precedex as tolerated.  Will start on low-dose Seroquel overnight  Blood glucose management  Discussed plan of care with the rounding team    Johnny Betts MD  01/08/19  11:45 AM

## 2019-01-08 NOTE — PROGRESS NOTES
Hospital Follow Up    LOS:  LOS: 2 days   Patient Name: Fredis Lockwood  Age/Sex: 68 y.o. male  : 1950  MRN: [id number]    Date of Hospital Visit: 19  Length of Stay: 2  Encounter Provider: Fredis Collado MD  Place of Service: [facility] CARDIOLOGY    Subjective:     Follow Up for: altered mental status, COPD    Interval History: clinically improved.  Off Bipap    Objective:     Objective:  Temp:  [98.6 °F (37 °C)-99 °F (37.2 °C)] 98.6 °F (37 °C)  Heart Rate:  [51-69] 54  Resp:  [11-25] 19  BP: (113-179)/() 149/88  FiO2 (%):  [40 %] 40 %  Body mass index is 29.97 kg/m².    Intake/Output Summary (Last 24 hours) at 2019 0832  Last data filed at 2019 2353  Gross per 24 hour   Intake 680 ml   Output 2875 ml   Net -2195 ml         19  2100 19  0157   Weight: 105 kg (231 lb 9.6 oz) 100 kg (221 lb)     Weight change:     Physical Exam:   General Appearance: lethargic on sedation.    HEENT: Normocephalic.  Neck: Supple. No JVD. No Carotid bruit. No thyromegaly  Lungs: CTAB. Normal respiratory effort and rate.  Heart:: Regular rate and rhythm, normal S1 and S2, no murmurs, gallops or rubs.  Abdomen: Soft, nontender, non-distended. positive bowel sounds  Extremities: Warm, no cyanosis, or clubbing. No edema.     Lab Review:   Results from last 7 days   Lab Units  19   0436  19   1807  19   1400   SODIUM mmol/L  142  138  139   POTASSIUM mmol/L  4.8  5.3*  5.3*   CHLORIDE mmol/L  96*  95*  97*   CO2 mmol/L  35.0*  34.0*  35.3*   BUN mg/dL  26*  24*  22   CREATININE mg/dL  1.14  1.16  1.17   GLUCOSE mg/dL  155*  133*  133*   CALCIUM mg/dL  9.2  9.3  9.0       Results from last 7 days   Lab Units  19   0631  19   2156   TROPONIN T ng/mL  0.181*  0.173*     Results from last 7 days   Lab Units  19   0436  19   0631   WBC 10*3/mm3  8.73  11.93*   HEMOGLOBIN g/dL  10.5*  10.2*   HEMATOCRIT %  34.7*  34.7*    PLATELETS 10*3/mm3  236  307     Results from last 7 days   Lab Units  01/06/19   2156   INR   1.30*   APTT seconds  31.8             Results from last 7 days   Lab Units  01/07/19   0631  01/06/19   2156   PROBNP pg/mL  5,516.0*  5,650.0*             I reviewed the patient's new clinical results.          I personally viewed and interpreted the patient's EKG/Telemetry data.  Current Medications:   Scheduled Meds:  heparin (porcine) 5,000 Units Subcutaneous Q8H   insulin lispro 0-20 Units Subcutaneous 4x Daily With Meals & Nightly   ipratropium-albuterol 3 mL Nebulization Q4H - RT   methylPREDNISolone sodium succinate 40 mg Intravenous Q8H     Continuous Infusions:  dexmedetomidine 0.2-1.5 mcg/kg/hr Last Rate: 0.8 mcg/kg/hr (01/08/19 0814)       Allergies:  No Known Allergies    Assessment & Plan   1. Acute Respiratory Failure with C)2 narcosis: off Bipap.  Clinically improved.  2. Altered Mental status secondary to #1  3. CAD: LVEF 30%.  S/p recent multivessel CABG.  4. Elevated troponin: no ecg changes.  Monitor  5. CHF: acute systolic.  Diuresed.  Continue to monitor fluid status  6. Hyperkalemia: improved.  No renql failure  7. Tobacco abuse: will continue to emphasize cesation  8. Severe COPD      Plan: continue supportive care at present.        Fredis Collado MD  01/08/19

## 2019-01-08 NOTE — PLAN OF CARE
Problem: Patient Care Overview  Goal: Plan of Care Review   01/08/19 0948   OTHER   Outcome Summary Patient with improved neuro status and alertness, now off BiPap. Patient tolerated puree and honey thick liquids, demonstrated discoordination with thin, nectar and mixed mech soft. Suspect due to alertness. Recommend initiate puree and honey thick diet, no straw, 1:1 supervision with all PO. Meds crushed in puree. May have ice chips or small sips of water in between meals after oral care, with supervision. Do not feed when not alert.   Coping/Psychosocial   Plan of Care Reviewed With patient   Plan of Care Review   Progress improving

## 2019-01-08 NOTE — PLAN OF CARE
Problem: Patient Care Overview  Goal: Plan of Care Review  Outcome: Ongoing (interventions implemented as appropriate)   01/08/19 7034   OTHER   Outcome Summary Much improved. Off BiPAP all day without any significant c/o SOB.  Weaned off precedex but, may use PRN for agitation.  ST seen and diet ordered.  May upgrade tomorrow now that he is more alert.  Plan for Cpap @ HS.       Goal: Individualization and Mutuality  Outcome: Ongoing (interventions implemented as appropriate)    Goal: Discharge Needs Assessment  Outcome: Ongoing (interventions implemented as appropriate)    Goal: Interprofessional Rounds/Family Conf  Outcome: Ongoing (interventions implemented as appropriate)      Problem: Skin Injury Risk (Adult)  Goal: Identify Related Risk Factors and Signs and Symptoms  Outcome: Ongoing (interventions implemented as appropriate)    Goal: Skin Health and Integrity  Outcome: Ongoing (interventions implemented as appropriate)      Problem: Fall Risk (Adult)  Goal: Identify Related Risk Factors and Signs and Symptoms  Outcome: Ongoing (interventions implemented as appropriate)    Goal: Absence of Fall  Outcome: Ongoing (interventions implemented as appropriate)      Problem: Chronic Obstructive Pulmonary Disease (Adult)  Goal: Signs and Symptoms of Listed Potential Problems Will be Absent, Minimized or Managed (Chronic Obstructive Pulmonary Disease)  Outcome: Ongoing (interventions implemented as appropriate)

## 2019-01-08 NOTE — THERAPY RE-EVALUATION
Acute Care - Speech Language Pathology   Swallow Re-Evaluation [facility]     Patient Name: Fredis Lockwood  : 1950  MRN: [id number]  Today's Date: 2019               Admit Date: 2019    Visit Dx:     ICD-10-CM ICD-9-CM   1. Altered mental status, unspecified altered mental status type R41.82 780.97   2. Elevated troponin R74.8 790.6   3. Hypoxemia R09.02 799.02     Patient Active Problem List   Diagnosis   • Benign essential HTN   • Benign prostatic hyperplasia with urinary obstruction   • ED (erectile dysfunction) of non-organic origin   • Elevated cholesterol   • Borderline diabetes   • Bundle branch block, right   • Avitaminosis D   • Tobacco abuse   • Nocturia more than twice per night   • Health care maintenance   • Medicare annual wellness visit, initial   • Shortness of breath   • Onychomycosis   • Abnormal EKG   • Need for influenza vaccination   • Pain of right hip joint   • Panlobular emphysema (CMS/HCC)   • Dilated cardiomyopathy (CMS/HCC)   • Coronary artery disease involving native coronary artery of native heart with angina pectoris (CMS/HCC)   • S/P CABG (coronary artery bypass graft)   • Ischemic cardiomyopathy   • Abdominal aortic aneurysm (AAA) without rupture (CMS/HCC)   • COPD (chronic obstructive pulmonary disease) (CMS/HCC)   • Nocturnal hypoxemia   • Hypoxia   • Anemia   • Altered mental status     Past Medical History:   Diagnosis Date   • AAA (abdominal aortic aneurysm) (CMS/HCC)    • Abnormal EKG    • Borderline diabetes    • BPH (benign prostatic hyperplasia)     w/Urinary Obstruction   • CAD (coronary artery disease)     S/p CABG 18   • Chronic pain of right lower extremity    • COPD (chronic obstructive pulmonary disease) (CMS/HCC)    • Dilated cardiomyopathy (CMS/HCC)    • CASTRO (dyspnea on exertion)    • Gastric ulcer     S/p Sx For Rupture   • Hyperlipidemia     Controlled w/Meds   • Hypertension     Controlled w/Meds   • Inguinal hernia     Hx Repair   •  Metabolic disease    • Nocturia Hx   • Presbyopia    • RBBB (right bundle branch block)    • Right bundle branch block    • Right hip pain    • SOB (shortness of breath)     Associated w/CAD     Past Surgical History:   Procedure Laterality Date   • CARDIAC CATHETERIZATION N/A 12/14/2018    Procedure: Coronary angiography;  Surgeon: Fredis Collado MD;  Location: [facility] CATH INVASIVE LOCATION;  Service: Cardiovascular   • CARDIAC CATHETERIZATION N/A 12/14/2018    Procedure: Left heart cath;  Surgeon: Fredis Collado MD;  Location: [facility] CATH [facility]VASIVE LOCATION;  Service: Cardiovascular   • CARDIAC CATHETERIZATION N/A 12/14/2018    Procedure: Left ventriculography;  Surgeon: Fredis Collado MD;  Location: [facility] CATH INVASIVE LOCATION;  Service: Cardiovascular   • CARDIAC CATHETERIZATION N/A 12/14/2018    Procedure: Right heart cath;  Surgeon: Fredis Collado MD;  Location: [facility] CATH INVASIVE LOCATION;  Service: Cardiovascular   • COLONOSCOPY  05/2012    WNL   • CORONARY ARTERY BYPASS GRAFT N/A 12/17/2018    Procedure: INTRAOP ALEISHA; CORONARY ARTERY BYPASS X7 WITH LEFT INTERNAL MAMMARY ARTERY GRAFT AND UTILIZING ENDOSCOPICALLY HARVESTED LEFT SAPHENOUS VEIN; PRP;  Surgeon: Servando Diez MD;  Location: [facility] OR;  Service: Cardiothoracic   • INGUINAL HERNIA REPAIR     • OTHER SURGICAL HISTORY      For Ruptured Ulcer   • VASECTOMY          SWALLOW EVALUATION (last 72 hours)      SLP Adult Swallow Evaluation     Row Name 01/08/19 0900                   Rehab Evaluation    Document Type  evaluation  -MT        Subjective Information  no complaints  -MT        Patient Observations  obtunded;decreased LOC  -MT        Patient Effort  adequate  -MT        Comment  eyes closed throughout eval but opened eyes to cues. Continually opened mouth for next bite/sip, but not truly alert  -MT        Symptoms Noted During/After Treatment  none  -MT           General Information    Patient Profile Reviewed   yes  -MT        Pertinent History Of Current Problem  severe hypercapnic respiratory failure and patient was placed on a BiPAP. Current smoker, COPD, hypercapnia  -MT        Current Method of Nutrition  NPO  -MT        Precautions/Limitations, Vision  WFL;for purposes of eval  -MT        Precautions/Limitations, Hearing  WFL;for purposes of eval  -MT        Prior Level of Function-Communication  WFL  -MT        Prior Level of Function-Swallowing  no diet consistency restrictions  -MT        Plans/Goals Discussed with  patient  -MT        Barriers to Rehab  medically complex has been poorly alert, somewhat improved now  -MT        Patient's Goals for Discharge  patient did not state  -MT           Oral Motor and Function    Dentition Assessment  natural, present and adequate  -MT        Secretion Management  WNL/WFL  -MT        Mucosal Quality  dry  -MT           Oral Musculature and Cranial Nerve Assessment    Oral Motor General Assessment  generalized oral motor weakness  -MT           General Eating/Swallowing Observations    Respiratory Support Currently in Use  nasal cannula  -MT        Eating/Swallowing Skills  fed by SLP  -MT        Positioning During Eating  upright in bed  -MT        Utensils Used  spoon;cup  -MT        Consistencies Trialed  soft textures;pureed;thin liquids;nectar/syrup-thick liquids;honey-thick liquids  -MT           Clinical Swallow Eval    Oral Prep Phase  WFL  -MT        Oral Transit  impaired  -MT        Oral Residue  WFL  -MT        Pharyngeal Phase  no overt signs/symptoms of pharyngeal impairment  -MT        Esophageal Phase  unremarkable  -MT        Clinical Swallow Evaluation Summary  Patient's swallow function was grossly functional. However, he demonstrated delay in initation of swallow with mech soft, and discoordination of oral transit and pharyngeal swallow. Also mild discoordination with thin liquids. Tolerated honey thick liquids and puree well, and no overt signs with  any consistency. Due to redued alertness, feel he would best tolerate a modified diet for now.   -MT           Oral Transit Concerns    Oral Transit Concerns  delayed initiation of bolus transit;increased oral transit time  -MT        Delayed Intiation of Bolus Transit  thin;nectar;mixed consistencies;mechanical soft  -MT        Increased Oral Transit Time  thin;nectar;mixed consistencies;mechanical soft  -MT           Clinical Impression    SLP Swallowing Diagnosis  mild;oral dysfunction;suspected pharyngeal dysfunction  -MT        Functional Impact  risk of aspiration/pneumonia;other suspect due to reduced alertness  -MT        Rehab Potential/Prognosis, Swallowing  good, to achieve stated therapy goals  -MT        Swallow Criteria for Skilled Therapeutic Interventions Met  demonstrates skilled criteria  -MT           Recommendations    Therapy Frequency (Swallow)  PRN  -MT        Predicted Duration Therapy Intervention (Days)  until discharge  -MT        SLP Diet Recommendation  puree;honey thick liquids;water between meals after oral care, with supervision;ice chips between meals after oral care, with supervision  -MT        Recommended Diagnostics  reassess via clinical swallow evaluation  -MT        Recommended Precautions and Strategies  upright posture during/after eating;small bites of food and sips of liquid;no straw  -MT        SLP Rec. for Method of Medication Administration  meds crushed;with pudding or applesauce  -MT        Monitor for Signs of Aspiration  yes;notify SLP if any concerns  -MT        Anticipated Dischage Disposition  unknown  -MT          User Key  (r) = Recorded By, (t) = Taken By, (c) = Cosigned By    Initials Name Effective Dates    MT Nicky Milian, MS CCC-SLP 06/08/18 -           EDUCATION  The patient has been educated in the following areas:   Dysphagia (Swallowing Impairment) Modified Diet Instruction.    SLP Recommendation and Plan  SLP Swallowing Diagnosis: mild, oral  dysfunction, suspected pharyngeal dysfunction  SLP Diet Recommendation: puree, honey thick liquids, water between meals after oral care, with supervision, ice chips between meals after oral care, with supervision  Recommended Precautions and Strategies: upright posture during/after eating, small bites of food and sips of liquid, no straw     Monitor for Signs of Aspiration: yes, notify SLP if any concerns  Recommended Diagnostics: reassess via clinical swallow evaluation  Swallow Criteria for Skilled Therapeutic Interventions Met: demonstrates skilled criteria  Anticipated Dischage Disposition: unknown  Rehab Potential/Prognosis, Swallowing: good, to achieve stated therapy goals  Therapy Frequency (Swallow): PRN  Predicted Duration Therapy Intervention (Days): until discharge       Plan of Care Reviewed With: patient  Plan of Care Review  Plan of Care Reviewed With: patient  Progress: improving  Outcome Summary: Patient with improved neuro status and alertness, now off BiPap. Patient tolerated puree and honey thick liquids, demonstrated discoordination with thin, nectar and mixed mech soft. Suspect due to alertness. Recommend initiate puree and honey thick diet, no straw, 1:1 supervision with all PO. Meds crushed in puree. May have ice chips or small sips of water in between meals after oral care, with supervision. Do not feed when not alert.         SLP Outcome Measures (last 72 hours)      SLP Outcome Measures     Row Name 01/08/19 0900             SLP Outcome Measures    Outcome Measure Used?  Adult NOMS  -MT         Adult FCM Scores    FCM Chosen  Swallowing  -MT      Swallowing FCM Score  3  -MT        User Key  (r) = Recorded By, (t) = Taken By, (c) = Cosigned By    Initials Name Effective Dates    MT MaicolNicky, MS CCC-SLP 06/08/18 -            Time Calculation:   Time Calculation- SLP     Row Name 01/08/19 0942             Time Calculation- SLP    SLP Start Time  0900  -MT      SLP Stop Time  0942  -MT       SLP Time Calculation (min)  42 min  -MT      SLP Received On  01/08/19  -MT        User Key  (r) = Recorded By, (t) = Taken By, (c) = Cosigned By    Initials Name Provider Type    Nicky Moe MS CCC-SLP Speech and Language Pathologist          Therapy Charges for Today     Code Description Service Date Service Provider Modifiers Qty    [id number] HC ST TREATMENT SWALLOW 3 1/8/2019 Nicky Milian MS CCC-SLP GN 1               Nicky Milian MS CCC-EMILI  1/8/2019

## 2019-01-09 NOTE — PLAN OF CARE
Problem: Patient Care Overview  Goal: Plan of Care Review   01/09/19 0701   OTHER   Outcome Summary Pt doing much better. Rested overnight on the bipap, up to bedside toilet with stand by assist times one. Tolerated bipap well,        Problem: Skin Injury Risk (Adult)  Goal: Identify Related Risk Factors and Signs and Symptoms  Outcome: Ongoing (interventions implemented as appropriate)    Goal: Skin Health and Integrity  Outcome: Ongoing (interventions implemented as appropriate)      Problem: Fall Risk (Adult)  Goal: Identify Related Risk Factors and Signs and Symptoms  Outcome: Ongoing (interventions implemented as appropriate)    Goal: Absence of Fall  Outcome: Ongoing (interventions implemented as appropriate)      Problem: Chronic Obstructive Pulmonary Disease (Adult)  Goal: Signs and Symptoms of Listed Potential Problems Will be Absent, Minimized or Managed (Chronic Obstructive Pulmonary Disease)  Outcome: Ongoing (interventions implemented as appropriate)

## 2019-01-09 NOTE — PROGRESS NOTES
Continued Stay Note  [facility]     Patient Name: Fredis Lockwood  MRN: [id number]  Today's Date: 1/9/2019    Admit Date: 1/6/2019    Discharge Plan     Row Name 01/09/19 1620       Plan    Plan  Home with home health and trilogy vs rehab    Patient/Family in Agreement with Plan  yes    Plan Comments  Patient thinks he needs rehab, left message for sister Justine.  MD requesting Trilogy, spoke with Nina, she will follow.  Will await information from sister.         Discharge Codes    No documentation.             Angie Zendejas RN

## 2019-01-09 NOTE — PROGRESS NOTES
"      [place] [facility]         Dr Turner Sayied   LOS: 3 days   Patient Care Team:  Jus Durham MD as PCP - General (Family Medicine)    Chief Complaint: Acute on chronic respiratory failure with CO2 narcosis COPD exacerbation and acute hyperkalemia    Interval History: Much improved this morning.  Currently off BiPAP.  No overnight issues reported.  Cardiology already transferred the patient out of the ICU today.  He is off Precedex completely.    REVIEW OF SYSTEMS:   Limited due to confusion.  And on Precedex  Ventilator/Non-Invasive Ventilation Settings (From admission, onward)    Start     Ordered    01/07/19 0603  NIPPV (CPAP or BIPAP)  Until Discontinued     Question Answer Comment   Indication: Acute Respiratory Failure    Type: AVAPS/PC/PS    NIPPV Mask Interface Full face mask    Backup Rate 10    Target VT (mL) 500    EPAP/PEEP (cm H2O) 6    Min Pressure (cm H2O) 10    Max Pressure (cm H2O) 30    Titrate for SPO2 88% - 92%        01/07/19 0603    01/07/19 0305  NIPPV (CPAP or BIPAP)  Until Discontinued,   Status:  Canceled     Question Answer Comment   Indication: Acute Respiratory Failure    Type: BIPAP    NIPPV Mask Interface Full face mask    IPAP 24    EPAP 7    Oxygen FIO2    FIO2 % 40    Tidal Volume 500    Breath Rate 24    Titrate for SPO2 88% - 92%        01/07/19 0306            Vital Signs  Temp:  [98.4 °F (36.9 °C)-99.2 °F (37.3 °C)] 99.2 °F (37.3 °C)  Heart Rate:  [54-87] 87  Resp:  [14-24] 16  BP: (130-165)/(69-96) 137/69  FiO2 (%):  [40 %] 40 %    Intake/Output Summary (Last 24 hours) at 1/9/2019 1113  Last data filed at 1/8/2019 2200  Gross per 24 hour   Intake 892 ml   Output 450 ml   Net 442 ml     Flowsheet Rows      First Filed Value   Admission Height  182.9 cm (72\") Documented at 01/06/2019 2100   Admission Weight  105 kg (231 lb 9.6 oz) Documented at 01/06/2019 2100          Physical Exam:   General Appearance:   Awake alert no distress.  Oriented ×3    Lungs:    " Diminished breath sounds with rhonchi and wheeze bilaterally     Heart:    Regular rhythm and normal rate, normal S1 and S2, no            murmur, no gallop, no rub, no click   Chest Wall:    No abnormalities observed   Abdomen:     Normal bowel sounds, no masses, no organomegaly, soft        non-tender, non-distended, no guarding, no rebound                tenderness   Extremities:   Moves all extremities well, trace edema, no cyanosis, no             redness     Results Review:        Results from last 7 days   Lab Units  01/09/19   0328  01/08/19   0436  01/07/19   1807   SODIUM mmol/L  141  142  138   POTASSIUM mmol/L  4.2  4.8  5.3*   CHLORIDE mmol/L  95*  96*  95*   CO2 mmol/L  35.4*  35.0*  34.0*   BUN mg/dL  30*  26*  24*   CREATININE mg/dL  1.12  1.14  1.16   GLUCOSE mg/dL  130*  155*  133*   CALCIUM mg/dL  9.2  9.2  9.3     Results from last 7 days   Lab Units  01/07/19   0631  01/06/19   2156   TROPONIN T ng/mL  0.181*  0.173*     Results from last 7 days   Lab Units  01/09/19   0328  01/08/19   0436  01/07/19   0631   WBC 10*3/mm3  8.76  8.73  11.93*   HEMOGLOBIN g/dL  9.8*  10.5*  10.2*   HEMATOCRIT %  33.0*  34.7*  34.7*   PLATELETS 10*3/mm3  280  236  307     Results from last 7 days   Lab Units  01/06/19   2156   INR   1.30*   APTT seconds  31.8                 Results from last 7 days   Lab Units  01/07/19   0737   PH, ARTERIAL pH units  7.301*   PO2 ART mm Hg  86.2   PCO2, ARTERIAL mm Hg  81.2*   HCO3 ART mmol/L  40.0*       I reviewed the patient's new clinical results.  I personally viewed and interpreted the patient's CXR        Medication Review:     carvedilol 12.5 mg Oral Q12H   docusate sodium 100 mg Oral BID   furosemide 40 mg Oral Daily   heparin (porcine) 5,000 Units Subcutaneous Q8H   insulin lispro 0-20 Units Subcutaneous 4x Daily With Meals & Nightly   ipratropium-albuterol 3 mL Nebulization 4x Daily - RT   methylPREDNISolone sodium succinate 40 mg Intravenous Q8H   nicotine 1 patch  Transdermal Q24H   QUEtiapine 25 mg Oral Nightly            ASSESSMENT:   PCCM Problems  Acute on chronic respiratory failure, NIV  CO2 Narcosis  Elevated troponin  CLAUDIA with severe hyperkalemia  Severe COPD with exacerbation  Chronic elevated right hemidiaphragm  Current smoker  SHAHZAD/OHV, pending study  Relevant Medical Diagnoses  CABG recent, 12/17/18  CAD  Nocturnal hypoxia  EF 24%          PLAN:  Off BiPAP and tolerating well nasal cannula oxygen.  Patient with severe COPD and admitted with CO2 narcosis.  I believe his mortality and morbidity would be improved if patient goes on trilogy home ventilator.  This will reduce his readmission hospital rate and improve his quality of life also.  Trilogy can be used as needed and with sleep at home.  I have asked  to order Trilogy.  Continue steroids and bronchodilators.  Wean steroid as tolerated  Hyperkalemia improved post diuresis.  We will hold off IV fluids for now  Echo with EF 25%.  Continue Lasix   no evidence to suggest infection as such  Agitation improved on Seroquel.  Will continue Seroquel at bedtime  Blood glucose management  Discussed plan of care with the rounding team  We will transfer patient out of the ICU    Johnny Betts MD  01/09/19  11:13 AM

## 2019-01-09 NOTE — PROGRESS NOTES
Hospital Follow Up    LOS:  LOS: 3 days   Patient Name: Fredis Lockwood  Age/Sex: 68 y.o. male  : 1950  MRN: [id number]    Date of Hospital Visit: 19  Length of Stay: 3  Encounter Provider: Fredis Collado MD  Place of Service: [facility] CARDIOLOGY    Subjective:     Follow Up for: CAD, altered mental status    Interval History: much more awake and alert      Objective:     Objective:  Temp:  [98.4 °F (36.9 °C)-99.2 °F (37.3 °C)] 99.2 °F (37.3 °C)  Heart Rate:  [52-77] 64  Resp:  [14-24] 18  BP: (130-165)/(69-93) 147/85  FiO2 (%):  [40 %] 40 %  Body mass index is 30.89 kg/m².    Intake/Output Summary (Last 24 hours) at 2019 0758  Last data filed at 2019 2200  Gross per 24 hour   Intake 892 ml   Output 450 ml   Net 442 ml         19  2100 19  0157 19  0645   Weight: 105 kg (231 lb 9.6 oz) 100 kg (221 lb) 103 kg (227 lb 11.8 oz)     Weight change:     Physical Exam:   General Appearance: Alert, cooperative, in no acute distress. AAOx4.   HEENT: Normocephalic.  Neck: Supple. No JVD. No Carotid bruit. No thyromegaly  Lungs: CTAB. Normal respiratory effort and rate.  Heart:: Regular rate and rhythm, normal S1 and S2, no murmurs, gallops or rubs.  Abdomen: Soft, nontender, non-distended. positive bowel sounds  Extremities: Warm, no cyanosis, or clubbing. No edema.     Lab Review:   Results from last 7 days   Lab Units  19   0328  19   0436  19   1807   SODIUM mmol/L  141  142  138   POTASSIUM mmol/L  4.2  4.8  5.3*   CHLORIDE mmol/L  95*  96*  95*   CO2 mmol/L  35.4*  35.0*  34.0*   BUN mg/dL  30*  26*  24*   CREATININE mg/dL  1.12  1.14  1.16   GLUCOSE mg/dL  130*  155*  133*   CALCIUM mg/dL  9.2  9.2  9.3       Results from last 7 days   Lab Units  19   0631  19   2156   TROPONIN T ng/mL  0.181*  0.173*     Results from last 7 days   Lab Units  19   0328  19   0436   WBC 10*3/mm3  8.76  8.73    HEMOGLOBIN g/dL  9.8*  10.5*   HEMATOCRIT %  33.0*  34.7*   PLATELETS 10*3/mm3  280  236     Results from last 7 days   Lab Units  01/06/19   2156   INR   1.30*   APTT seconds  31.8             Results from last 7 days   Lab Units  01/09/19   0328  01/07/19   0631  01/06/19   2156   PROBNP pg/mL  2,844.0*  5,516.0*  5,650.0*             I reviewed the patient's new clinical results.          I personally viewed and interpreted the patient's EKG/Telemetry data.  Current Medications:   Scheduled Meds:  docusate sodium 100 mg Oral BID   furosemide 40 mg Intravenous Daily   heparin (porcine) 5,000 Units Subcutaneous Q8H   insulin lispro 0-20 Units Subcutaneous 4x Daily With Meals & Nightly   ipratropium-albuterol 3 mL Nebulization 4x Daily - RT   methylPREDNISolone sodium succinate 40 mg Intravenous Q8H   nicotine 1 patch Transdermal Q24H   QUEtiapine 25 mg Oral Nightly     Continuous Infusions:     Allergies:  No Known Allergies    Assessment & Plan     1. Acute Respiratory Failure with C)2 narcosis: off Bipap.  Clinically improved.  2. Altered Mental status secondary to #1  3. CAD: LVEF 30%.  S/p recent multivessel CABG.  4. Elevated troponin: no ecg changes.  Monitor  5. CHF: acute systolic.  Diuresed.  Continue to monitor fluid status.  BNP down.  Switch to oral diuretics.  6. Hyperkalemia: improved.  No renql failure  7. Tobacco abuse: will continue to emphasize cesation  8. Severe COPD  9. Hypertension:  BP elevated.  Will start beta blocker.            Fredis Collado MD  01/09/19

## 2019-01-09 NOTE — DISCHARGE PLACEMENT REQUEST
"Fredis Lockwood (68 y.o. Male)     Date of Birth Social Security Number Address Home Phone MRN    1950  [address] [id number] [phone] [id number]    Mandaen Marital Status          [facility], [place], operated [facility] Single       Admission Date Admission Type Admitting Provider Attending Provider Department, Room/Bed    1/6/19 Emergency Fredis Collado MD Licandro, Rudolph F, MD [facility] INTENSIVE CARE, I385/1    Discharge Date Discharge Disposition Discharge Destination                       Attending Provider:  Fredis Collado MD    Allergies:  No Known Allergies    Isolation:  None   Infection:  None   Code Status:  CPR    Ht:  182.9 cm (72\")   Wt:  103 kg (227 lb 11.8 oz)    Admission Cmt:  None   Principal Problem:  None                Active Insurance as of 1/6/2019     Primary Coverage     Payor Plan Insurance Group Employer/Plan Group    MEDICARE MEDICARE A & B      Payor Plan Address Payor Plan Phone Number Payor Plan Fax Number Effective Dates    [address] [phone]  8/1/2013 - None Entered    [facility] [id number]       Subscriber Name Subscriber Birth Date Member ID       FREDIS LOCKWOOD 1950 [id number]           Secondary Coverage     Payor Plan Insurance Group Employer/Plan Group    AARP MED SUPP AARP HEALTH CARE OPTIONS PLAN F     Payor Plan Address Payor Plan Phone Number Payor Plan Fax Number Effective Dates    [facility] [phone]  1/1/2018 - None Entered    [address]       [facility] [address]       Subscriber Name Subscriber Birth Date Member ID       FREDIS LOCKWOOD 1950 [id number]           Tertiary Coverage     Payor Plan Insurance Group Employer/Plan Group     FOR LIFE  FOR LIFE MC SUPP      Payor Plan Address Payor Plan Phone Number Payor Plan Fax Number Effective Dates    [address] [phone]  2/7/2018 - None Entered    [facility] [address]       Subscriber Name Subscriber Birth Date Member ID       " HARDIK LOCKWOOD 1950 [id number]                 Emergency Contacts      (Rel.) Home Phone Work Phone Mobile Phone    Justine Torres (Surrogate) [phone] -- [phone]    Obi Lockwood (Son) [phone] -- --    Cirilo Lockwood (Daughter) [phone] -- [phone]    Deloris Stinson (Daughter) -- -- [phone]

## 2019-01-09 NOTE — THERAPY RE-EVALUATION
Acute Care - Speech Language Pathology   Swallow Re-Evaluation [facility]     Patient Name: Fredis Lockwood  : 1950  MRN: [id number]  Today's Date: 2019               Admit Date: 2019    Visit Dx:     ICD-10-CM ICD-9-CM   1. Altered mental status, unspecified altered mental status type R41.82 780.97   2. Elevated troponin R74.8 790.6   3. Hypoxemia R09.02 799.02     Patient Active Problem List   Diagnosis   • Benign essential HTN   • Benign prostatic hyperplasia with urinary obstruction   • ED (erectile dysfunction) of non-organic origin   • Elevated cholesterol   • Borderline diabetes   • Bundle branch block, right   • Avitaminosis D   • Tobacco abuse   • Nocturia more than twice per night   • Health care maintenance   • Medicare annual wellness visit, initial   • Shortness of breath   • Onychomycosis   • Abnormal EKG   • Need for influenza vaccination   • Pain of right hip joint   • Panlobular emphysema (CMS/HCC)   • Dilated cardiomyopathy (CMS/HCC)   • Coronary artery disease involving native coronary artery of native heart with angina pectoris (CMS/HCC)   • S/P CABG (coronary artery bypass graft)   • Ischemic cardiomyopathy   • Abdominal aortic aneurysm (AAA) without rupture (CMS/HCC)   • COPD (chronic obstructive pulmonary disease) (CMS/HCC)   • Nocturnal hypoxemia   • Hypoxia   • Anemia   • Altered mental status     Past Medical History:   Diagnosis Date   • AAA (abdominal aortic aneurysm) (CMS/HCC)    • Abnormal EKG    • Borderline diabetes    • BPH (benign prostatic hyperplasia)     w/Urinary Obstruction   • CAD (coronary artery disease)     S/p CABG 18   • Chronic pain of right lower extremity    • COPD (chronic obstructive pulmonary disease) (CMS/HCC)    • Dilated cardiomyopathy (CMS/HCC)    • CASTRO (dyspnea on exertion)    • Gastric ulcer     S/p Sx For Rupture   • Hyperlipidemia     Controlled w/Meds   • Hypertension     Controlled w/Meds   • Inguinal hernia     Hx Repair   •  Metabolic disease    • Nocturia Hx   • Presbyopia    • RBBB (right bundle branch block)    • Right bundle branch block    • Right hip pain    • SOB (shortness of breath)     Associated w/CAD     Past Surgical History:   Procedure Laterality Date   • CARDIAC CATHETERIZATION N/A 12/14/2018    Procedure: Coronary angiography;  Surgeon: Fredis Collado MD;  Location[facility] C[facility]AS[facility] LOC[facility];  Service: Cardiovascular   • CARDIAC CATHETERIZATION N/A 12/14/2018    Procedure: Left heart cath;  Surgeon: Fredis Collado MD;  Location: [facility] CATH [facility]AS[facility] LOC[facility];  Service: Cardiovascular   • CARDIAC CATHETERIZATION N/A 12/14/2018    Procedure: Left ventriculography;  Surgeon: Fredis Collado MD;  Location: [facility] CATH INVASIVE LOCATION;  Service: Cardiovascular   • CARDIAC CATHETERIZATION N/A 12/14/2018    Procedure: Right heart cath;  Surgeon: Fredis Collado MD;  Location: [facility] CATH INVASIVE LOCATION;  Service: Cardiovascular   • COLONOSCOPY  05/2012    WNL   • CORONARY ARTERY BYPASS GRAFT N/A 12/17/2018    Procedure: INTRAOP ALEISHA; CORONARY ARTERY BYPASS X7 WITH LEFT INTERNAL MAMMARY ARTERY GRAFT AND UTILIZING ENDOSCOPICALLY HARVESTED LEFT SAPHENOUS VEIN; PRP;  Surgeon: Servando Diez MD;  Location: [facility] OR;  Service: Cardiothoracic   • INGUINAL HERNIA REPAIR     • OTHER SURGICAL HISTORY      For Ruptured Ulcer   • VASECTOMY          SWALLOW EVALUATION (last 72 hours)      SLP Adult Swallow Evaluation     Row Name 01/09/19 1100 01/08/19 0900                Rehab Evaluation    Document Type  re-evaluation  -OC  evaluation  -MT       Subjective Information  no complaints  -OC  no complaints  -MT       Patient Observations  alert;cooperative;agree to therapy much improvement with alertness compared to previous date  -OC  obtunded;decreased LOC  -MT       Patient Effort  good  -OC  adequate  -MT       Comment  --  eyes closed throughout eval but opened eyes to cues. Continually opened  mouth for next bite/sip, but not truly alert  -MT       Symptoms Noted During/After Treatment  none  -OC  none  -MT          General Information    Patient Profile Reviewed  yes  -OC  yes  -MT       Pertinent History Of Current Problem  --  severe hypercapnic respiratory failure and patient was placed on a BiPAP. Current smoker, COPD, hypercapnia  -MT       Current Method of Nutrition  NPO  -OC  NPO  -MT       Precautions/Limitations, Vision  WFL;for purposes of eval  -OC  WFL;for purposes of eval  -MT       Precautions/Limitations, Hearing  WFL;for purposes of eval  -OC  WFL;for purposes of eval  -MT       Prior Level of Function-Communication  WFL  -OC  WFL  -MT       Prior Level of Function-Swallowing  no diet consistency restrictions  -OC  no diet consistency restrictions  -MT       Plans/Goals Discussed with  patient  -OC  patient  -MT       Barriers to Rehab  --  medically complex has been poorly alert, somewhat improved now  -MT       Patient's Goals for Discharge  patient did not state  -OC  patient did not state  -MT          Oral Motor and Function    Dentition Assessment  natural, present and adequate  -OC  natural, present and adequate  -MT       Secretion Management  WNL/WFL  -OC  WNL/WFL  -MT       Mucosal Quality  moist, healthy  -OC  dry  -MT       Volitional Swallow  WFL  -OC  --       Volitional Cough  WFL  -OC  --          Oral Musculature and Cranial Nerve Assessment    Oral Motor General Assessment  WFL  -OC  generalized oral motor weakness  -MT          General Eating/Swallowing Observations    Respiratory Support Currently in Use  --  nasal cannula  -MT       Eating/Swallowing Skills  --  fed by SLP  -MT       Positioning During Eating  --  upright in bed  -MT       Utensils Used  --  spoon;cup  -MT       Consistencies Trialed  --  soft textures;pureed;thin liquids;nectar/syrup-thick liquids;honey-thick liquids  -MT          Clinical Swallow Eval    Oral Prep Phase  WFL  -OC  WFL  -MT        Oral Transit  WFL  -OC  impaired  -MT       Oral Residue  WFL  -OC  WFL  -MT       Pharyngeal Phase  suspected pharyngeal impairment  -OC  no overt signs/symptoms of pharyngeal impairment  -MT       Esophageal Phase  --  unremarkable  -MT       Clinical Swallow Evaluation Summary  Pt alertness and ability to maintain alertness much improved this date. Pt demonstrated inconsistent throat clear and cough with thin liquids (5/8 trials). No overt s/s aspiration with nectar thick via cup, puree, mech soft, and regular textures. Functional mastication and no oral residue present.  -OC  Patient's swallow function was grossly functional. However, he demonstrated delay in initation of swallow with mech soft, and discoordination of oral transit and pharyngeal swallow. Also mild discoordination with thin liquids. Tolerated honey thick liquids and puree well, and no overt signs with any consistency. Due to redued alertness, feel he would best tolerate a modified diet for now.   -MT          Oral Transit Concerns    Oral Transit Concerns  --  delayed initiation of bolus transit;increased oral transit time  -MT       Delayed Intiation of Bolus Transit  --  thin;nectar;mixed consistencies;mechanical soft  -MT       Increased Oral Transit Time  --  thin;nectar;mixed consistencies;mechanical soft  -MT          Clinical Impression    SLP Swallowing Diagnosis  mild;oral dysfunction;pharyngeal dysfunction  -OC  mild;oral dysfunction;suspected pharyngeal dysfunction  -MT       Functional Impact  risk of aspiration/pneumonia;other  -OC  risk of aspiration/pneumonia;other suspect due to reduced alertness  -MT       Rehab Potential/Prognosis, Swallowing  good, to achieve stated therapy goals  -OC  good, to achieve stated therapy goals  -MT       Swallow Criteria for Skilled Therapeutic Interventions Met  demonstrates skilled criteria  -OC  demonstrates skilled criteria  -MT          Recommendations    Therapy Frequency (Swallow)  PRN  -OC   PRN  -MT       Predicted Duration Therapy Intervention (Days)  until discharge  -OC  until discharge  -MT       SLP Diet Recommendation  water between meals after oral care, with supervision;regular textures;nectar thick liquids  -OC  puree;honey thick liquids;water between meals after oral care, with supervision;ice chips between meals after oral care, with supervision  -MT       Recommended Diagnostics  reassess via clinical swallow evaluation  -OC  reassess via clinical swallow evaluation  -MT       Recommended Precautions and Strategies  upright posture during/after eating;small bites of food and sips of liquid;no straw  -OC  upright posture during/after eating;small bites of food and sips of liquid;no straw  -MT       SLP Rec. for Method of Medication Administration  meds crushed;meds whole;with pudding or applesauce  -OC  meds crushed;with pudding or applesauce  -MT       Monitor for Signs of Aspiration  yes;notify SLP if any concerns  -OC  yes;notify SLP if any concerns  -MT       Anticipated Dischage Disposition  unknown  -OC  unknown  -MT          Swallow Goals (SLP)    Oral Nutrition/Hydration Goal Selection (SLP)  oral nutrition/hydration, SLP goal 1  -OC  --          Oral Nutrition/Hydration Goal 1 (SLP)    Oral Nutrition/Hydration Goal 1, SLP  Tolerate least restrictive diet with no overt s/s aspiration.   -OC  --       Time Frame (Oral Nutrition/Hydration Goal 1, SLP)  by discharge  -OC  --         User Key  (r) = Recorded By, (t) = Taken By, (c) = Cosigned By    Initials Name Effective Dates    OC Farideh Ramirez MA,[facility]-SLP 06/08/18 -     MT Nicky Milian MS CCC-SLP 06/08/18 -           EDUCATION  The patient has been educated in the following areas:   Dysphagia (Swallowing Impairment).    SLP Recommendation and Plan  SLP Swallowing Diagnosis: mild, oral dysfunction, pharyngeal dysfunction  SLP Diet Recommendation: water between meals after oral care, with supervision, regular textures, nectar thick  liquids  Recommended Precautions and Strategies: upright posture during/after eating, small bites of food and sips of liquid, no straw     Monitor for Signs of Aspiration: yes, notify SLP if any concerns  Recommended Diagnostics: reassess via clinical swallow evaluation  Swallow Criteria for Skilled Therapeutic Interventions Met: demonstrates skilled criteria  Anticipated Dischage Disposition: unknown  Rehab Potential/Prognosis, Swallowing: good, to achieve stated therapy goals  Therapy Frequency (Swallow): PRN  Predicted Duration Therapy Intervention (Days): until discharge       Plan of Care Reviewed With: patient  Plan of Care Review  Plan of Care Reviewed With: patient  Outcome Summary: Re-eval at bedside completed. Recommend regular textures and nectar thick liquids, meds whole with puree., Water protocol ok. ST to follow for re-eval as appropriate.     SLP GOALS     Row Name 01/09/19 1100             Oral Nutrition/Hydration Goal 1 (SLP)    Oral Nutrition/Hydration Goal 1, SLP  Tolerate least restrictive diet with no overt s/s aspiration.   -OC      Time Frame (Oral Nutrition/Hydration Goal 1, SLP)  by discharge  -OC        User Key  (r) = Recorded By, (t) = Taken By, (c) = Cosigned By    Initials Name Provider Type    Farideh Alvarenga MA,CCC-SLP Speech and Language Pathologist           SLP Outcome Measures (last 72 hours)      SLP Outcome Measures     Row Name 01/09/19 1100 01/08/19 0900          SLP Outcome Measures    Outcome Measure Used?  Adult NOMS  -OC  Adult NOMS  -MT        Adult FCM Scores    FCM Chosen  Swallowing  -OC  Swallowing  -MT     Swallowing FCM Score  5  -OC  3  -MT       User Key  (r) = Recorded By, (t) = Taken By, (c) = Cosigned By    Initials Name Effective Dates    Farideh Alvarenga MA,CCC-SLP 06/08/18 -     MT Nicky Milian MS CCC-SLP 06/08/18 -            Time Calculation:   Time Calculation- SLP     Row Name 01/09/19 1525             Time Calculation- SLP    SLP Start Time  1100   -OC      SLP Received On  01/09/19  -OC        User Key  (r) = Recorded By, (t) = Taken By, (c) = Cosigned By    Initials Name Provider Type    OC Farideh Ramirez MA,CCC-SLP Speech and Language Pathologist          Therapy Charges for Today     Code Description Service Date Service Provider Modifiers Qty    [id number] HC ST TREATMENT SWALLOW 4 1/9/2019 Farideh Ramirez MA,RENÉE-SLP GN 1               Farideh Ramirez MA, CCC-EMILI  1/9/2019

## 2019-01-09 NOTE — PLAN OF CARE
Problem: Patient Care Overview  Goal: Plan of Care Review  Outcome: Ongoing (interventions implemented as appropriate)   01/09/19 1045   OTHER   Outcome Summary Re-eval at bedside completed. Recommend regular textures and nectar thick liquids, meds whole with puree., Water protocol ok. ST to follow for re-eval as appropriate.    Coping/Psychosocial   Plan of Care Reviewed With patient

## 2019-01-10 NOTE — PLAN OF CARE
Problem: Patient Care Overview  Goal: Plan of Care Review   01/10/19 8543   OTHER   Outcome Summary Pt presents to OT eval following AMS and COPD exac. Pt able to perform ADLs with SBA this date and stand to perform LBD with supervision. Pt at or near baseline, no UE deficits at this time. Pt to d/c home.   Coping/Psychosocial   Plan of Care Reviewed With patient

## 2019-01-10 NOTE — THERAPY DISCHARGE NOTE
Acute Care - Physical Therapy Initial Eval/Discharge  [facility]     Patient Name: Fredis Lockwood  : 1950  MRN: [id number]  Today's Date: 1/10/2019   Onset of Illness/Injury or Date of Surgery: 19            Admit Date: 2019    Visit Dx:    ICD-10-CM ICD-9-CM   1. Altered mental status, unspecified altered mental status type R41.82 780.97   2. Elevated troponin R74.8 790.6   3. Hypoxemia R09.02 799.02     Patient Active Problem List   Diagnosis   • Benign essential HTN   • Benign prostatic hyperplasia with urinary obstruction   • ED (erectile dysfunction) of non-organic origin   • Elevated cholesterol   • Borderline diabetes   • Bundle branch block, right   • Avitaminosis D   • Tobacco abuse   • Nocturia more than twice per night   • Health care maintenance   • Medicare annual wellness visit, initial   • Shortness of breath   • Onychomycosis   • Abnormal EKG   • Need for influenza vaccination   • Pain of right hip joint   • Panlobular emphysema (CMS/HCC)   • Dilated cardiomyopathy (CMS/HCC)   • Coronary artery disease involving native coronary artery of native heart with angina pectoris (CMS/HCC)   • S/P CABG (coronary artery bypass graft)   • Ischemic cardiomyopathy   • Abdominal aortic aneurysm (AAA) without rupture (CMS/HCC)   • COPD (chronic obstructive pulmonary disease) (CMS/HCC)   • Nocturnal hypoxemia   • Hypoxia   • Anemia   • Altered mental status     Past Medical History:   Diagnosis Date   • AAA (abdominal aortic aneurysm) (CMS/HCC)    • Abnormal EKG    • Borderline diabetes    • BPH (benign prostatic hyperplasia)     w/Urinary Obstruction   • CAD (coronary artery disease)     S/p CABG 18   • Chronic pain of right lower extremity    • COPD (chronic obstructive pulmonary disease) (CMS/HCC)    • Dilated cardiomyopathy (CMS/HCC)    • CASTRO (dyspnea on exertion)    • Gastric ulcer     S/p Sx For Rupture   • Hyperlipidemia     Controlled w/Meds   • Hypertension     Controlled  w/Meds   • Inguinal hernia     Hx Repair   • Metabolic disease    • Nocturia Hx   • Presbyopia    • RBBB (right bundle branch block)    • Right bundle branch block    • Right hip pain    • SOB (shortness of breath)     Associated w/CAD     Past Surgical History:   Procedure Laterality Date   • CARDIAC CATHETERIZATION N/A 12/14/2018    Procedure: Coronary angiography;  Surgeon: Fredis Collado MD;  Location: [facility] CATH INVASIVE LOCATION;  Service: Cardiovascular   • CARDIAC CATHETERIZATION N/A 12/14/2018    Procedure: Left heart cath;  Surgeon: Fredis Collado MD;  Location: [facility] CATH INVASIVE LOCATION;  Service: Cardiovascular   • CARDIAC CATHETERIZATION N/A 12/14/2018    Procedure: Left ventriculography;  Surgeon: Fredis Collado MD;  [facility];  Service: Cardiovascular   • CARDIAC CATHETERIZATION N/A 12/14/2018    Procedure: Right heart cath;  Surgeon: Fredis Collado MD;  Location: [facility] CATH INVASIVE LOCATION;  Service: Cardiovascular   • COLONOSCOPY  05/2012    WNL   • CORONARY ARTERY BYPASS GRAFT N/A 12/17/2018    Procedure: INTRAOP ALEISHA; CORONARY ARTERY BYPASS X7 WITH LEFT INTERNAL MAMMARY ARTERY GRAFT AND UTILIZING ENDOSCOPICALLY HARVESTED LEFT SAPHENOUS VEIN; PRP;  Surgeon: Servando Diez MD;  Location: [facility];  Service: Cardiothoracic   • INGUINAL HERNIA REPAIR     • OTHER SURGICAL HISTORY      For Ruptured Ulcer   • VASECTOMY            PT ASSESSMENT (last 12 hours)      Physical Therapy Evaluation     Row Name 01/10/19 1113          PT Evaluation Time/Intention    Subjective Information  no complaints  -CH     Document Type  discharge evaluation/summary  -CH     Mode of Treatment  physical therapy  -CH     Patient Effort  good  -CH     Symptoms Noted During/After Treatment  none  -CH     Row Name 01/10/19 1113          General Information    Onset of Illness/Injury or Date of Surgery  01/06/19  -CH     Patient Observations   alert;cooperative;agree to therapy  -     Patient/Family Observations  pt sitting in chair, no acute distress noted at rest  -     Prior Level of Function  independent:;gait;transfer;bed mobility;ADL's  -     Equipment Currently Used at Home  oxygen  -     Pertinent History of Current Functional Problem  pt admitted with AMS and respiratory failure, pt with h/o COPD and recent open heart surgery  -     Existing Precautions/Restrictions  no known precautions/restrictions  -     Row Name 01/10/19 1113          Relationship/Environment    Lives With  alone  -     Row Name 01/10/19 1113          Resource/Environmental Concerns    Current Living Arrangements  home/apartment/condo  -     Row Name 01/10/19 1113          Cognitive Assessment/Interventions    Additional Documentation  Cognitive Assessment/Intervention (Group)  -     Row Name 01/10/19 1113          Cognitive Assessment/Intervention- PT/OT    Orientation Status (Cognition)  oriented x 4  -     Follows Commands (Cognition)  WFL  -     Personal Safety Interventions  fall prevention program maintained;gait belt;nonskid shoes/slippers when out of bed  -     Row Name 01/10/19 1113          Bed Mobility Assessment/Treatment    Bed Mobility Assessment/Treatment  supine-sit;sit-supine  -     Supine-Sit [place] (Bed Mobility)  not tested  -     Sit-Supine [place] (Bed Mobility)  not tested  -     Comment (Bed Mobility)  sitting in chair  -     Row Name 01/10/19 1113          Transfer Assessment/Treatment    Transfer Assessment/Treatment  sit-stand transfer;stand-sit transfer  -     Sit-Stand [place] (Transfers)  supervision  -     Stand-Sit [place] (Transfers)  supervision  -     Row Name 01/10/19 1113          Gait/Stairs Assessment/Training    [place] Level (Gait)  supervision  -     Distance in Feet (Gait)  150  -     Comment (Gait/Stairs)  pt on 2L with activity, no complaints of SOA, No LOB or  impaired gait noted  -CH     Row Name 01/10/19 1113          General ROM    GENERAL ROM COMMENTS  AROM WFL for age  -CH     Row Name 01/10/19 1113          MMT (Manual Muscle Testing)    General MMT Comments  B LE grossly 4+/5 to 5/5, no unilateral or focal weakness noted  -CH     Row Name 01/10/19 1113          Motor Assessment/Intervention    Additional Documentation  Balance (Group)  -CH     Row Name 01/10/19 1113          Balance    Balance  static standing balance;dynamic standing balance  -CH     Row Name 01/10/19 1113          Static Standing Balance    Level of [place] (Supported Standing, Static Balance)  supervision  -CH     Row Name 01/10/19 1113          Dynamic Standing Balance    Level of [place], Reaches Outside Midline (Standing, Dynamic Balance)  supervision  -CH     Row Name 01/10/19 1113          Pain Assessment    Additional Documentation  Pain Scale: Numbers Pre/Post-Treatment (Group)  -CH     Row Name 01/10/19 1113          Pain Scale: Numbers Pre/Post-Treatment    Pain Scale: Numbers, Pretreatment  0/10 - no pain  -CH     Row Name 01/10/19 1113          Plan of Care Review    Plan of Care Reviewed With  patient  -CH     Row Name 01/10/19 1113          Physical Therapy Clinical Impression    Criteria for Skilled Interventions Met (PT Clinical Impression)  no problems identified which require skilled intervention;current level of function same as previous level of function  -CH     Row Name 01/10/19 1113          Vital Signs    Pre SpO2 (%)  97  -CH     O2 Delivery Pre Treatment  supplemental O2  -     Post SpO2 (%)  95  -CH     O2 Delivery Post Treatment  supplemental O2  -CH     Row Name 01/10/19 1113          Positioning and Restraints    Pre-Treatment Position  sitting in chair/recliner  -     Post Treatment Position  chair  -     In Chair  sitting;call light within reach;encouraged to call for assist  -       User Key  (r) = Recorded By, (t) = Taken By, (c) = Cosigned By     Initials Name Provider Type     Jacy Mahan PT Physical Therapist          Physical Therapy Education     Title: PT OT SLP Therapies (Done)     Topic: Physical Therapy (Done)     Point: Mobility training (Done)     Learning Progress Summary           Patient Acceptance, E,TB,D, SAPNA,DU by  at 1/10/2019 11:23 AM    Comment:  Pt educated on benefits of activity and encouraged to walk in halls regularly with nsg and family.                               User Key     Initials Effective Dates Name Provider Type Discipline     04/03/18 -  Jacy Mahan PT Physical Therapist PT                PT Recommendation and Plan  Anticipated Discharge Disposition (PT): home  Planned Therapy Interventions (PT Eval): (no acute care PT needs identified at this time)  Therapy Frequency (PT Clinical Impression): evaluation only  Outcome Summary/Treatment Plan (PT)  Anticipated Discharge Disposition (PT): home  Plan of Care Reviewed With: patient  Outcome Summary: Pt demonstrates adequate strength and balance to perform functional mobility and gait independently. Pt did no have any SOA with activity and O2 sats at 95% on 2L O2 upon returning back to room. Pt encouraged to ambulate in halls with family and nsg. PT will sign off at this time.    Outcome Measures     Row Name 01/10/19 1100             How much help from another person do you currently need...    Turning from your back to your side while in flat bed without using bedrails?  4  -CH      Moving from lying on back to sitting on the side of a flat bed without bedrails?  4  -CH      Moving to and from a bed to a chair (including a wheelchair)?  4  -CH      Standing up from a chair using your arms (e.g., wheelchair, bedside chair)?  4  -CH      Climbing 3-5 steps with a railing?  3  -CH      To walk in hospital room?  4  -CH      AM-PAC 6 Clicks Score  23  -CH         Functional Assessment    Outcome Measure Options  AM-PAC 6 Clicks Basic Mobility (PT)  -CH         User Key  (r) = Recorded By, (t) = Taken By, (c) = Cosigned By    Initials Name Provider Type     Jacy Mahan, PT Physical Therapist           Time Calculation:   PT Charges     Row Name 01/10/19 1126             Time Calculation    Start Time  1058  -      Stop Time  1110  -      Time Calculation (min)  12 min  -      PT Received On  01/10/19  -         Time Calculation- PT    Total Timed Code Minutes- PT  8 minute(s)  -        User Key  (r) = Recorded By, (t) = Taken By, (c) = Cosigned By    Initials Name Provider Type     Jacy Mahan, PT Physical Therapist        Therapy Suggested Charges     Code   Minutes Charges    None           Therapy Charges for Today     Code Description Service Date Service Provider Modifiers Qty    64565607917 HC PT EVAL LOW COMPLEXITY 1 1/10/2019 Jacy Mahan, PT GP 1    83015157169 HC PT THER PROC EA 15 MIN 1/10/2019 Jacy Mahan PT GP 1          PT G-Codes  Outcome Measure Options: AM-PAC 6 Clicks Basic Mobility (PT)  AM-PAC 6 Clicks Score: 23    PT Discharge Summary  Anticipated Discharge Disposition (PT): home    Jacy Mahan, PT  1/10/2019

## 2019-01-10 NOTE — PLAN OF CARE
Problem: Patient Care Overview  Goal: Plan of Care Review  Outcome: Ongoing (interventions implemented as appropriate)   01/10/19 1124   OTHER   Outcome Summary Pt demonstrates adequate strength and balance to perform functional mobility and gait independently. Pt did no have any SOA with activity and O2 sats at 95% on 2L O2 upon returning back to room. Pt encouraged to ambulate in halls with family and nsg. PT will sign off at this time.   Coping/Psychosocial   Plan of Care Reviewed With patient

## 2019-01-10 NOTE — DISCHARGE PLACEMENT REQUEST
"Fredis Lockwood (68 y.o. Male)     Date of Birth Social Security Number Address Home Phone MRN    1950  [address] [id number] [phone] [id number]    Jew Marital Status          [facility] Single       Admission Date Admission Type Admitting Provider Attending Provider Department, Room/Bed    1/6/19 Emergency Fredis Collado MD Licandro, Rudolph F, MD [facility] INTENSIVE CARE, I385/1    Discharge Date Discharge Disposition Discharge Destination                       Attending Provider:  Fredis Collado MD    Allergies:  No Known Allergies    Isolation:  None   Infection:  None   Code Status:  CPR    Ht:  182.9 cm (72\")   Wt:  103 kg (227 lb 11.8 oz)    Admission Cmt:  None   Principal Problem:  None                Active Insurance as of 1/6/2019     Primary Coverage     Payor Plan Insurance Group Employer/Plan Group    MEDICARE MEDICARE A & B      Payor Plan Address Payor Plan Phone Number Payor Plan Fax Number Effective Dates    [address] [phone]  8/1/2013 - None Entered    [facility] [id number]       Subscriber Name Subscriber Birth Date Member ID       FREDIS LOCKWOOD 1950 [id number]           Secondary Coverage     Payor Plan Insurance Group Employer/Plan Group    AARP MED SUPP AARP HEALTH CARE OPTIONS PLAN F     Payor Plan Address Payor Plan Phone Number Payor Plan Fax Number Effective Dates    [facility] [phone]  1/1/2018 - None Entered    [address]       [facility] [id number]       Subscriber Name Subscriber Birth Date Member ID       FREDIS LOCKWOOD 1950 [id number]           Tertiary Coverage     Payor Plan Insurance Group Employer/Plan Group     FOR LIFE  FOR LIFE MC SUPP      Payor Plan Address Payor Plan Phone Number Payor Plan Fax Number Effective Dates    [address] [phone]  2/7/2018 - None Entered    [facility] [address]       Subscriber Name Subscriber Birth Date Member ID       " HARDIK LOCKWOOD 1950 [id number]                 Emergency Contacts      (Rel.) Home Phone Work Phone Mobile Phone    Justine Torres (Surrogate) [phone] -- [phone]    Obi Lockwood (Son) [phone] -- --    Cirilo Lockwood (Daughter) [phone] -- [phone]    Deloris Stinson (Daughter) -- -- [phone]

## 2019-01-10 NOTE — PROGRESS NOTES
Hospital Follow Up    LOS:  LOS: 4 days   Patient Name: Fredis Lockwood  Age/Sex: 68 y.o. male  : 1950  MRN: [id number]    Date of Hospital Visit: 01/10/19  Length of Stay: 4  Encounter Provider: Fredis Collado MD  Place of Service: [facility] CARDIOLOGY    Subjective:     Follow Up for: CAD, AMS    Interval History: awake and alert    Objective:     Objective:  Temp:  [98.1 °F (36.7 °C)-99.1 °F (37.3 °C)] 98.3 °F (36.8 °C)  Heart Rate:  [57-87] 65  Resp:  [16-18] 16  BP: (129-173)/() 158/96  FiO2 (%):  [40 %] 40 %  Body mass index is 30.89 kg/m².    Intake/Output Summary (Last 24 hours) at 1/10/2019 0807  Last data filed at 1/10/2019 0500  Gross per 24 hour   Intake --   Output 600 ml   Net -600 ml         19  2100 19  0157 19  0645   Weight: 105 kg (231 lb 9.6 oz) 100 kg (221 lb) 103 kg (227 lb 11.8 oz)     Weight change:     Physical Exam:   General Appearance: Alert, cooperative, in no acute distress. AAOx4.   HEENT: Normocephalic.  Neck: Supple. No JVD. No Carotid bruit. No thyromegaly  Lungs: CTAB. Normal respiratory effort and rate.  Heart:: Regular rate and rhythm, normal S1 and S2, no murmurs, gallops or rubs.  Abdomen: Soft, nontender, non-distended. positive bowel sounds  Extremities: Warm, no cyanosis, or clubbing. No edema.     Lab Review:   Results from last 7 days   Lab Units  19   0328  19   0436  19   1807   SODIUM mmol/L  141  142  138   POTASSIUM mmol/L  4.2  4.8  5.3*   CHLORIDE mmol/L  95*  96*  95*   CO2 mmol/L  35.4*  35.0*  34.0*   BUN mg/dL  30*  26*  24*   CREATININE mg/dL  1.12  1.14  1.16   GLUCOSE mg/dL  130*  155*  133*   CALCIUM mg/dL  9.2  9.2  9.3       Results from last 7 days   Lab Units  19   0631  19   2156   TROPONIN T ng/mL  0.181*  0.173*     Results from last 7 days   Lab Units  19   0328  19   0436   WBC 10*3/mm3  8.76  8.73   HEMOGLOBIN g/dL  9.8*  10.5*    HEMATOCRIT %  33.0*  34.7*   PLATELETS 10*3/mm3  280  236     Results from last 7 days   Lab Units  01/06/19   2156   INR   1.30*   APTT seconds  31.8             Results from last 7 days   Lab Units  01/09/19   0328  01/07/19   0631  01/06/19   2156   PROBNP pg/mL  2,844.0*  5,516.0*  5,650.0*             I reviewed the patient's new clinical results.          I personally viewed and interpreted the patient's EKG/Telemetry data.  Current Medications:   Scheduled Meds:  carvedilol 12.5 mg Oral Q12H   docusate sodium 100 mg Oral BID   furosemide 40 mg Oral Daily   heparin (porcine) 5,000 Units Subcutaneous Q8H   insulin lispro 0-20 Units Subcutaneous 4x Daily With Meals & Nightly   ipratropium-albuterol 3 mL Nebulization 4x Daily - RT   methylPREDNISolone sodium succinate 20 mg Intravenous Q8H   nicotine 1 patch Transdermal Q24H   QUEtiapine 25 mg Oral Nightly     Continuous Infusions:     Allergies:  No Known Allergies    Assessment & Plan     1. Acute Respiratory Failure with C)2 narcosis: off Bipap.  Clinically improved.  2. Altered Mental status secondary to #1  3. CAD: LVEF 30%.  S/p recent multivessel CABG.  4. Elevated troponin: no ecg changes.  Monitor  5. CHF: acute systolic.  Diuresed.  Continue to monitor fluid status  6. Hyperkalemia: improved.  No renql failure  7. Tobacco abuse: will continue to emphasize cesation  8. Severe COPD          Plan: much improved.  Waiting for bed.  Home soon      Fredis Collado MD  01/10/19

## 2019-01-10 NOTE — THERAPY DISCHARGE NOTE
Acute Care - Occupational Therapy Initial Eval/Discharge  [facility]     Patient Name: Fredis Lockwood  : 1950  MRN: [id number]  Today's Date: 1/10/2019  Onset of Illness/Injury or Date of Surgery: 19     Referring Physician: Roxane      Admit Date: 2019       ICD-10-CM ICD-9-CM   1. Altered mental status, unspecified altered mental status type R41.82 780.97   2. Elevated troponin R74.8 790.6   3. Hypoxemia R09.02 799.02     Patient Active Problem List   Diagnosis   • Benign essential HTN   • Benign prostatic hyperplasia with urinary obstruction   • ED (erectile dysfunction) of non-organic origin   • Elevated cholesterol   • Borderline diabetes   • Bundle branch block, right   • Avitaminosis D   • Tobacco abuse   • Nocturia more than twice per night   • Health care maintenance   • Medicare annual wellness visit, initial   • Shortness of breath   • Onychomycosis   • Abnormal EKG   • Need for influenza vaccination   • Pain of right hip joint   • Panlobular emphysema (CMS/HCC)   • Dilated cardiomyopathy (CMS/HCC)   • Coronary artery disease involving native coronary artery of native heart with angina pectoris (CMS/HCC)   • S/P CABG (coronary artery bypass graft)   • Ischemic cardiomyopathy   • Abdominal aortic aneurysm (AAA) without rupture (CMS/HCC)   • COPD (chronic obstructive pulmonary disease) (CMS/HCC)   • Nocturnal hypoxemia   • Hypoxia   • Anemia   • Altered mental status     Past Medical History:   Diagnosis Date   • AAA (abdominal aortic aneurysm) (CMS/HCC)    • Abnormal EKG    • Borderline diabetes    • BPH (benign prostatic hyperplasia)     w/Urinary Obstruction   • CAD (coronary artery disease)     S/p CABG 18   • Chronic pain of right lower extremity    • COPD (chronic obstructive pulmonary disease) (CMS/HCC)    • Dilated cardiomyopathy (CMS/HCC)    • CASTRO (dyspnea on exertion)    • Gastric ulcer     S/p Sx For Rupture   • Hyperlipidemia     Controlled w/Meds   • Hypertension      Controlled w/Meds   • Inguinal hernia     Hx Repair   • Metabolic disease    • Nocturia Hx   • Presbyopia    • RBBB (right bundle branch block)    • Right bundle branch block    • Right hip pain    • SOB (shortness of breath)     Associated w/CAD     Past Surgical History:   Procedure Laterality Date   • CARDIAC CATHETERIZATION N/A 12/14/2018    Procedure: Coronary angiography;  Surgeon: Fredis Collado MD;  Location: [facility] CATH INVASIVE LOCATION;  Service: Cardiovascular   • CARDIAC CATHETERIZATION N/A 12/14/2018    Procedure: Left heart cath;  Surgeon: Fredis Collado MD;  Location: [facility] CATH INVASIVE LOCATION;  Service: Cardiovascular   • CARDIAC CATHETERIZATION N/A 12/14/2018    Procedure: Left ventriculography;  Surgeon: Fredis Collado MD;  [facility];  Service: Cardiovascular   • CARDIAC CATHETERIZATION N/A 12/14/2018    Procedure: Right heart cath;  Surgeon: Fredis Collado MD;  Location: [facility] CATH INVASIVE LOCATION;  Service: Cardiovascular   • COLONOSCOPY  05/2012    WNL   • CORONARY ARTERY BYPASS GRAFT N/A 12/17/2018    Procedure: INTRAOP ALEISHA; CORONARY ARTERY BYPASS X7 WITH LEFT INTERNAL MAMMARY ARTERY GRAFT AND UTILIZING ENDOSCOPICALLY HARVESTED LEFT SAPHENOUS VEIN; PRP;  Surgeon: Servando Diez MD;  Location: [facility];  Service: Cardiothoracic   • INGUINAL HERNIA REPAIR     • OTHER SURGICAL HISTORY      For Ruptured Ulcer   • VASECTOMY            OT ASSESSMENT FLOWSHEET (last 72 hours)      Occupational Therapy Evaluation     Row Name 01/10/19 8915                   OT Evaluation Time/Intention    Subjective Information  no complaints  -SG        Document Type  evaluation;discharge evaluation/summary  -SG        Mode of Treatment  individual therapy;occupational therapy  -SG        Patient Effort  good  -SG        Symptoms Noted During/After Treatment  none  -SG           General Information    Patient Profile Reviewed?  yes  -SG         Referring Physician  Sayied  -SG        Patient Observations  alert;cooperative;agree to therapy  -SG        General Observations of Patient  Pt supine in bed  -SG        Prior Level of Function  independent:;ADL's  -SG        Equipment Currently Used at Home  oxygen  -SG        Pertinent History of Current Functional Problem  AMS, resp failure  -SG        Existing Precautions/Restrictions  no known precautions/restrictions  -SG        Barriers to Rehab  none identified  -SG           Relationship/Environment    Lives With  alone  -SG           Cognitive Assessment/Intervention- PT/OT    Orientation Status (Cognition)  oriented x 4  -SG        Follows Commands (Cognition)  WFL  -SG        Personal Safety Interventions  gait belt;fall prevention program maintained  -SG           Bed Mobility Assessment/Treatment    Supine-Sit [place] (Bed Mobility)  conditional independence  -SG        Sit-Supine [place] (Bed Mobility)  not tested  -           Functional Mobility    Functional Mobility- Ind. Level  supervision required  -        Functional Mobility- Comment  Walks to commode from bed and back to chair  -           Transfer Assessment/Treatment    Transfer Assessment/Treatment  sit-stand transfer;stand-sit transfer;toilet transfer  -           Stand-Sit Transfer    Stand-Sit [place] (Transfers)  supervision  -           Toilet Transfer    Type (Toilet Transfer)  sit-stand;stand-sit  -SG        [place] Level (Toilet Transfer)  supervision  -SG           ADL Assessment/Intervention    BADL Assessment/Intervention  lower body dressing;toileting  -           Lower Body Dressing Assessment/Training    Lower Body Dressing [place] Level  don;pants/bottoms;supervision  -SG        Lower Body Dressing Position  unsupported standing  -SG        Comment (Lower Body Dressing)  Performs standing holding EOB for UE support  -           Toileting Assessment/Training    [place] Level  (Toileting)  toileting skills;adjust/manage clothing;perform perineal hygiene;supervision  -SG        Assistive Devices (Toileting)  grab bar/safety frame  -SG        Toileting Position  supported sitting;supported standing  -SG           General ROM    GENERAL ROM COMMENTS  BUE WFL  -SG           MMT (Manual Muscle Testing)    General MMT Comments  BUE WFL  -SG           Positioning and Restraints    Pre-Treatment Position  in bed  -SG        Post Treatment Position  chair  -SG        In Chair  sitting;call light within reach;encouraged to call for assist;notified nsg  -SG           Pain Scale: Numbers Pre/Post-Treatment    Pain Scale: Numbers, Pretreatment  0/10 - no pain  -SG           Plan of Care Review    Plan of Care Reviewed With  patient  -SG           Clinical Impression (OT)    OT Diagnosis  Pt at or near baseline  -SG        Criteria for Skilled Therapeutic Interventions Met (OT Eval)  no problems identified which require skilled intervention;current level of function same as previous level of function  -SG        Therapy Frequency (OT Eval)  evaluation only  -SG        Anticipated Discharge Disposition (OT)  home  -SG           Vital Signs    O2 Delivery Intra Treatment  room air  -SG        Post SpO2 (%)  95  -SG        O2 Delivery Post Treatment  supplemental O2  -SG        Pre Patient Position  Supine  -SG        Intra Patient Position  Standing  -SG        Post Patient Position  Sitting  -SG           Patient Education Goal (OT)    Activity (Patient Education Goal, OT)  Pt to perform ADLs safely and take rest breaks due to fatigue.  -SG        [place]/Cues/Accuracy (Memory Goal 2, OT)  verbalizes understanding  -SG        Time Frame (Patient Education Goal, OT)  short term goal (STG);1 day  -SG        Progress/Outcome (Patient Education Goal, OT)  goal met  -SG          User Key  (r) = Recorded By, (t) = Taken By, (c) = Cosigned By    Initials Name Effective Dates    SG Monae Esquivel, MERIR  12/26/18 -               OT Recommendation and Plan  Outcome Summary/Treatment Plan (OT)  Anticipated Discharge Disposition (OT): home  Therapy Frequency (OT Eval): evaluation only  Plan of Care Review  Plan of Care Reviewed With: patient  Plan of Care Reviewed With: patient  Outcome Summary: Pt presents to OT eval following AMS and COPD exac. Pt able to perform ADLs with SBA this date and stand to perform LBD with supervision. Pt at or near baseline, no UE deficits at this time. Pt to d/c home.     Rehab Goal Summary     Row Name 01/10/19 1325             Patient Education Goal (OT)    Activity (Patient Education Goal, OT)  Pt to perform ADLs safely and take rest breaks due to fatigue.  -SG      Mobile/Cues/Accuracy (Memory Goal 2, OT)  verbalizes understanding  -SG      Time Frame (Patient Education Goal, OT)  short term goal (STG);1 day  -SG      Progress/Outcome (Patient Education Goal, OT)  goal met  -SG        User Key  (r) = Recorded By, (t) = Taken By, (c) = Cosigned By    Initials Name Provider Type Discipline    SG Monae Esquivel, OTR Occupational Therapist OT          Outcome Measures     Row Name 01/10/19 1300 01/10/19 1100          How much help from another person do you currently need...    Turning from your back to your side while in flat bed without using bedrails?  --  4  -CH     Moving from lying on back to sitting on the side of a flat bed without bedrails?  --  4  -CH     Moving to and from a bed to a chair (including a wheelchair)?  --  4  -CH     Standing up from a chair using your arms (e.g., wheelchair, bedside chair)?  --  4  -CH     Climbing 3-5 steps with a railing?  --  3  -CH     To walk in hospital room?  --  4  -CH     AM-PAC 6 Clicks Score  --  23  -CH        How much help from another is currently needed...    Putting on and taking off regular lower body clothing?  3  -SG  --     Bathing (including washing, rinsing, and drying)  3  -SG  --     Toileting (which includes using  toilet bed pan or urinal)  4  -SG  --     Putting on and taking off regular upper body clothing  4  -SG  --     Taking care of personal grooming (such as brushing teeth)  4  -SG  --     Eating meals  4  -SG  --     Score  22  -SG  --        Functional Assessment    Outcome Measure Options  AM-PAC 6 Clicks Daily Activity (OT)  -  AM-PAC 6 Clicks Basic Mobility (PT)  -       User Key  (r) = Recorded By, (t) = Taken By, (c) = Cosigned By    Initials Name Provider Type    Monae Fernandes OTR Occupational Therapist    CH Jacy Mahan, PT Physical Therapist          Time Calculation:   Time Calculation- OT     Row Name 01/10/19 1333             Time Calculation- OT    OT Start Time  0950  -      OT Stop Time  1008  -      OT Time Calculation (min)  18 min  -      Total Timed Code Minutes- OT  10 minute(s)  -      OT Non-Billable Time (min)  18 min  -      OT Received On  01/10/19  -        User Key  (r) = Recorded By, (t) = Taken By, (c) = Cosigned By    Initials Name Provider Type    Monae Fernandes OTR Occupational Therapist        Therapy Suggested Charges     Code   Minutes Charges    None           Therapy Charges for Today     Code Description Service Date Service Provider Modifiers Qty    [id number]  OT EVAL MOD COMPLEXITY 2 1/10/2019 Monae Esquivel OTR GO 1    [id number]  OT SELF CARE/MGMT/TRAIN EA 15 MIN 1/10/2019 Monae Esquivel OTR GO 1               OT Discharge Summary  Anticipated Discharge Disposition (OT): home    [facility]  1/10/2019

## 2019-01-10 NOTE — PROGRESS NOTES
"[facility] PULMONARY CARE         Dr Turner Sayied   LOS: 4 days   Patient Care Team:  Jus Durham MD as PCP - General (Family Medicine)    Chief Complaint: Acute on chronic respiratory failure with CO2 narcosis COPD exacerbation and acute hyperkalemia    Interval History: Continues to improve every day.  No overnight issues reported.  Awaiting bed to transfer out of the ICU.  Off Precedex completely.    REVIEW OF SYSTEMS:   Ventilator/Non-Invasive Ventilation Settings (From admission, onward)    Start     Ordered    01/07/19 0603  NIPPV (CPAP or BIPAP)  Until Discontinued     Question Answer Comment   Indication: Acute Respiratory Failure    Type: AVAPS/PC/PS    NIPPV Mask Interface Full face mask    Backup Rate 10    Target VT (mL) 500    EPAP/PEEP (cm H2O) 6    Min Pressure (cm H2O) 10    Max Pressure (cm H2O) 30    Titrate for SPO2 88% - 92%        01/07/19 0603    01/07/19 0305  NIPPV (CPAP or BIPAP)  Until Discontinued,   Status:  Canceled     Question Answer Comment   Indication: Acute Respiratory Failure    Type: BIPAP    NIPPV Mask Interface Full face mask    IPAP 24    EPAP 7    Oxygen FIO2    FIO2 % 40    Tidal Volume 500    Breath Rate 24    Titrate for SPO2 88% - 92%        01/07/19 0306        No chest pain or shortness of breath.    Vital Signs  Temp:  [98.1 °F (36.7 °C)-99.1 °F (37.3 °C)] 98.3 °F (36.8 °C)  Heart Rate:  [57-80] 61  Resp:  [16-18] 18  BP: (129-173)/() 155/93  FiO2 (%):  [40 %] 40 %    Intake/Output Summary (Last 24 hours) at 1/10/2019 1137  Last data filed at 1/10/2019 0500  Gross per 24 hour   Intake --   Output 600 ml   Net -600 ml     Flowsheet Rows      First Filed Value   Admission Height  182.9 cm (72\") Documented at 01/06/2019 2100   Admission Weight  105 kg (231 lb 9.6 oz) Documented at 01/06/2019 2100          Physical Exam:   General Appearance:   Awake alert no distress.  Oriented ×3    Lungs:    Diminished breath sounds with rhonchi and wheeze bilaterally  "    Heart:    Regular rhythm and normal rate, normal S1 and S2, no            murmur, no gallop, no rub, no click   Chest Wall:    No abnormalities observed   Abdomen:     Normal bowel sounds, no masses, no organomegaly, soft        non-tender, non-distended, no guarding, no rebound                tenderness   Extremities:   Moves all extremities well, trace edema, no cyanosis, no             redness     Results Review:        Results from last 7 days   Lab Units  01/09/19   0328  01/08/19   0436  01/07/19   1807   SODIUM mmol/L  141  142  138   POTASSIUM mmol/L  4.2  4.8  5.3*   CHLORIDE mmol/L  95*  96*  95*   CO2 mmol/L  35.4*  35.0*  34.0*   BUN mg/dL  30*  26*  24*   CREATININE mg/dL  1.12  1.14  1.16   GLUCOSE mg/dL  130*  155*  133*   CALCIUM mg/dL  9.2  9.2  9.3     Results from last 7 days   Lab Units  01/07/19   0631  01/06/19   2156   TROPONIN T ng/mL  0.181*  0.173*     Results from last 7 days   Lab Units  01/09/19   0328  01/08/19   0436  01/07/19   0631   WBC 10*3/mm3  8.76  8.73  11.93*   HEMOGLOBIN g/dL  9.8*  10.5*  10.2*   HEMATOCRIT %  33.0*  34.7*  34.7*   PLATELETS 10*3/mm3  280  236  307     Results from last 7 days   Lab Units  01/06/19   2156   INR   1.30*   APTT seconds  31.8                 Results from last 7 days   Lab Units  01/07/19   0737   PH, ARTERIAL pH units  7.301*   PO2 ART mm Hg  86.2   PCO2, ARTERIAL mm Hg  81.2*   HCO3 ART mmol/L  40.0*       I reviewed the patient's new clinical results.  I personally viewed and interpreted the patient's CXR        Medication Review:     carvedilol 12.5 mg Oral Q12H   docusate sodium 100 mg Oral BID   furosemide 40 mg Oral Daily   heparin (porcine) 5,000 Units Subcutaneous Q8H   insulin lispro 0-20 Units Subcutaneous 4x Daily With Meals & Nightly   ipratropium-albuterol 3 mL Nebulization 4x Daily - RT   methylPREDNISolone sodium succinate 20 mg Intravenous Q8H   nicotine 1 patch Transdermal Q24H   QUEtiapine 25 mg Oral Nightly             ASSESSMENT:   PCCM Problems  Acute on chronic respiratory failure, NIV  CO2 Narcosis  Elevated troponin  CLAUDIA with severe hyperkalemia  Severe COPD with exacerbation  Chronic elevated right hemidiaphragm  Current smoker  SHAHZAD/OHV, pending study  Relevant Medical Diagnoses  CABG recent, 12/17/18  CAD  Nocturnal hypoxia  EF 24%          PLAN:  Off BiPAP and tolerating well nasal cannula oxygen.  Patient with severe COPD and admitted with CO2 narcosis.  I believe his mortality and morbidity would be improved if patient goes on trilogy home ventilator.  This will reduce his readmission hospital rate and improve his quality of life also.  Trilogy can be used as needed and with sleep at home.  I have asked  to order Trilogy.  We will see if he can get Trilogy during hospitalization.  Continue steroids and bronchodilators.  Wean steroid as tolerated  Hyperkalemia improved post diuresis.  We will hold off IV fluids for now  Echo with EF 25%.  Continue Lasix   no evidence to suggest infection as such  Agitation improved on Seroquel.  Will continue Seroquel at bedtime  Blood glucose management  Discussed plan of care with the rounding team  Awaiting bed to transfer out of the ICU    Johnny Betts MD  01/10/19  11:37 AM

## 2019-01-10 NOTE — PLAN OF CARE
Problem: Patient Care Overview  Goal: Plan of Care Review   01/10/19 0741   OTHER   Outcome Summary Pt rested well overnight. No distress, wore bipap.       Problem: Skin Injury Risk (Adult)  Goal: Identify Related Risk Factors and Signs and Symptoms  Outcome: Ongoing (interventions implemented as appropriate)    Goal: Skin Health and Integrity  Outcome: Ongoing (interventions implemented as appropriate)      Problem: Fall Risk (Adult)  Goal: Identify Related Risk Factors and Signs and Symptoms  Outcome: Ongoing (interventions implemented as appropriate)    Goal: Absence of Fall  Outcome: Ongoing (interventions implemented as appropriate)      Problem: Chronic Obstructive Pulmonary Disease (Adult)  Goal: Signs and Symptoms of Listed Potential Problems Will be Absent, Minimized or Managed (Chronic Obstructive Pulmonary Disease)  Outcome: Ongoing (interventions implemented as appropriate)

## 2019-01-10 NOTE — PROGRESS NOTES
Continued Stay Note  [facility]     Patient Name: Fredis Lockwood  MRN: [id number]  Today's Date: 1/10/2019    Admit Date: 1/6/2019    Discharge Plan     Row Name 01/10/19 1456       Plan    Plan  Home with [facility] and Trilogy    Patient/Family in Agreement with Plan  yes    Plan Comments  Met with patient and sister at bedside.  Patient has been up with nursing assistance and doing very well.  Working with Nina with [facility] to get a trilogy.  Patient agreeable to [facility] services, left message on the [phone] number.  Will continue to monitor for new or changing discharge needs.        Discharge Codes    No documentation.             Angie Zendejas RN

## 2019-01-11 NOTE — THERAPY RE-EVALUATION
Acute Care - Speech Language Pathology   Swallow Initial Evaluation [facility]     Patient Name: Fredis Lockwood  : 1950  MRN: [id number]  Today's Date: 2019  Onset of Illness/Injury or Date of Surgery: 19     Referring Physician: Roxane      Admit Date: 2019    Visit Dx:     ICD-10-CM ICD-9-CM   1. Altered mental status, unspecified altered mental status type R41.82 780.97   2. Elevated troponin R74.8 790.6   3. Hypoxemia R09.02 799.02   4. Hypoxia R09.02 799.02     Patient Active Problem List   Diagnosis   • Benign essential HTN   • Benign prostatic hyperplasia with urinary obstruction   • ED (erectile dysfunction) of non-organic origin   • Elevated cholesterol   • Borderline diabetes   • Bundle branch block, right   • Avitaminosis D   • Tobacco abuse   • Nocturia more than twice per night   • Health care maintenance   • Medicare annual wellness visit, initial   • Shortness of breath   • Onychomycosis   • Abnormal EKG   • Need for influenza vaccination   • Pain of right hip joint   • Panlobular emphysema (CMS/HCC)   • Dilated cardiomyopathy (CMS/HCC)   • Coronary artery disease involving native coronary artery of native heart with angina pectoris (CMS/HCC)   • S/P CABG (coronary artery bypass graft)   • Ischemic cardiomyopathy   • Abdominal aortic aneurysm (AAA) without rupture (CMS/HCC)   • COPD (chronic obstructive pulmonary disease) (CMS/HCC)   • Nocturnal hypoxemia   • Hypoxia   • Anemia   • Altered mental status     Past Medical History:   Diagnosis Date   • AAA (abdominal aortic aneurysm) (CMS/HCC)    • Abnormal EKG    • Borderline diabetes    • BPH (benign prostatic hyperplasia)     w/Urinary Obstruction   • CAD (coronary artery disease)     S/p CABG 18   • Chronic pain of right lower extremity    • COPD (chronic obstructive pulmonary disease) (CMS/HCC)    • Dilated cardiomyopathy (CMS/HCC)    • CASTRO (dyspnea on exertion)    • Gastric ulcer     S/p Sx For Rupture   •  Hyperlipidemia     Controlled w/Meds   • Hypertension     Controlled w/Meds   • Inguinal hernia     Hx Repair   • Metabolic disease    • Nocturia Hx   • Presbyopia    • RBBB (right bundle branch block)    • Right bundle branch block    • Right hip pain    • SOB (shortness of breath)     Associated w/CAD     Past Surgical History:   Procedure Laterality Date   • CARDIAC CATHETERIZATION N/A 12/14/2018    Procedure: Coronary angiography;  Surgeon: Fredis Collado MD;  [facility];  Service: Cardiovascular   • CARDIAC CATHETERIZATION N/A 12/14/2018    Procedure: Left heart cath;  Surgeon: Fredis Collado MD;  [facility];  Service: Cardiovascular   • CARDIAC CATHETERIZATION N/A 12/14/2018    Procedure: Left ventriculography;  Surgeon: Fredis Collado MD;  [facility];  Service: Cardiovascular   • CARDIAC CATHETERIZATION N/A 12/14/2018    Procedure: Right heart cath;  Surgeon: Fredis Collado MD;  Location: [facility] CATH INVASIVE LOCATION;  Service: Cardiovascular   • COLONOSCOPY  05/2012    WNL   • CORONARY ARTERY BYPASS GRAFT N/A 12/17/2018    Procedure: INTRAOP ALEISHA; CORONARY ARTERY BYPASS X7 WITH LEFT INTERNAL MAMMARY ARTERY GRAFT AND UTILIZING ENDOSCOPICALLY HARVESTED LEFT SAPHENOUS VEIN; PRP;  Surgeon: Servando Diez MD;  Location: [facility];  Service: Cardiothoracic   • INGUINAL HERNIA REPAIR     • OTHER SURGICAL HISTORY      For Ruptured Ulcer   • VASECTOMY          SWALLOW EVALUATION (last 72 hours)      SLP Adult Swallow Evaluation     Row Name 01/11/19 1330 01/09/19 1100          Document Type  evaluation  -OC  re-evaluation  -OC    Subjective Information  no complaints  -OC  no complaints  -OC    Patient Observations  alert;cooperative;agree to therapy  -OC  alert;cooperative;agree to therapy much improvement with alertness compared to previous date  -OC    Patient Effort  good  -OC  good  -OC     Symptoms Noted During/After Treatment  none  -OC  none  -OC          Patient Profile Reviewed  yes  -OC  yes  -OC    Current Method of Nutrition  regular textures;nectar/syrup-thick liquids  -OC  NPO  -OC    Precautions/Limitations, Vision  WFL;for purposes of eval  -OC  WFL;for purposes of eval  -OC    Precautions/Limitations, Hearing  WFL;for purposes of eval  -OC  WFL;for purposes of eval  -OC    Prior Level of Function-Communication  WFL  -OC  WFL  -OC    Prior Level of Function-Swallowing  no diet consistency restrictions  -OC  no diet consistency restrictions  -OC    Plans/Goals Discussed with  patient  -OC  patient  -OC    Patient's Goals for Discharge  return to regular diet  -OC  patient did not state  -OC          Pain Scale: Numbers, Pretreatment  0/10 - no pain  -OC  --          Dentition Assessment  --  natural, present and adequate  -OC    Secretion Management  --  WNL/WFL  -OC    Mucosal Quality  --  moist, healthy  -OC    Volitional Swallow  --  WFL  -OC    Volitional Cough  --  WFL  -OC          Oral Motor General Assessment  --  WFL  -OC          Oral Prep Phase  --  WFL  -OC    Oral Transit  --  WFL  -OC    Oral Residue  --  WFL  -OC    Pharyngeal Phase  --  suspected pharyngeal impairment  -OC    Clinical Swallow Evaluation Summary  Re-eval of swallow completed this date. No overt s/s aspiration with thin liquids via cup or straw and regular textures.   -OC  Pt alertness and ability to maintain alertness much improved this date. Pt demonstrated inconsistent throat clear and cough with thin liquids (5/8 trials). No overt s/s aspiration with nectar thick via cup, puree, mech soft, and regular textures. Functional mastication and no oral residue present.  -OC          SLP Swallowing Diagnosis  functional oral phase;functional pharyngeal phase  -OC  mild;oral dysfunction;pharyngeal dysfunction  -OC    Functional Impact  no impact on function  -OC  risk of aspiration/pneumonia;other  -OC    Rehab  Potential/Prognosis, Swallowing  good, to achieve stated therapy goals  -OC  good, to achieve stated therapy goals  -OC    Swallow Criteria for Skilled Therapeutic Interventions Met  no problems identified which require skilled intervention  -OC  demonstrates skilled criteria  -OC          Therapy Frequency (Swallow)  PRN  -OC  PRN  -OC    Predicted Duration Therapy Intervention (Days)  until discharge  -OC  until discharge  -OC    SLP Diet Recommendation  regular textures;thin liquids  -OC  water between meals after oral care, with supervision;regular textures;nectar thick liquids  -OC    Recommended Diagnostics  --  reassess via clinical swallow evaluation  -OC    Recommended Precautions and Strategies  upright posture during/after eating;small bites of food and sips of liquid  -OC  upright posture during/after eating;small bites of food and sips of liquid;no straw  -OC    SLP Rec. for Method of Medication Administration  meds whole;with thin liquids;with pudding or applesauce  -OC  meds crushed;meds whole;with pudding or applesauce  -OC    Monitor for Signs of Aspiration  yes;notify SLP if any concerns  -OC  yes;notify SLP if any concerns  -OC    Anticipated Dischage Disposition  other (see comments) No further therapy anticipated  -OC  unknown  -OC          Oral Nutrition/Hydration Goal Selection (SLP)  --  oral nutrition/hydration, SLP goal 1  -OC          Oral Nutrition/Hydration Goal 1, SLP  --  Tolerate least restrictive diet with no overt s/s aspiration.   -OC    Time Frame (Oral Nutrition/Hydration Goal 1, SLP)  --  by discharge  -OC      User Key  (r) = Recorded By, (t) = Taken By, (c) = Cosigned By    Initials Name Effective Dates    OC Farideh Ramirez MA,[facility]SLP 06/08/18 -           EDUCATION  The patient has been educated in the following areas:   Dysphagia (Swallowing Impairment).    SLP Recommendation and Plan  SLP Swallowing Diagnosis: functional oral phase, functional pharyngeal phase  SLP Diet  Recommendation: regular textures, thin liquids  Recommended Precautions and Strategies: upright posture during/after eating, small bites of food and sips of liquid     Monitor for Signs of Aspiration: yes, notify SLP if any concerns     Swallow Criteria for Skilled Therapeutic Interventions Met: no problems identified which require skilled intervention  Anticipated Dischage Disposition: other (see comments)(No further therapy anticipated)  Rehab Potential/Prognosis, Swallowing: good, to achieve stated therapy goals  Therapy Frequency (Swallow): PRN  Predicted Duration Therapy Intervention (Days): until discharge       Plan of Care Reviewed With: patient  Plan of Care Review  Plan of Care Reviewed With: patient  Outcome Summary: Re-eval of swallow completed. No overt s/s aspiration with thin via cup or straw. Recommend uprgrade to to thin liquids and regular textures, meds whole with thin/puree. ST to s/o at this time. Please re-consult as appropriate.     SLP GOALS     Row Name 01/09/19 1100             Oral Nutrition/Hydration Goal 1 (SLP)    Oral Nutrition/Hydration Goal 1, SLP  Tolerate least restrictive diet with no overt s/s aspiration.   -OC      Time Frame (Oral Nutrition/Hydration Goal 1, SLP)  by discharge  -OC        User Key  (r) = Recorded By, (t) = Taken By, (c) = Cosigned By    Initials Name Provider Type    Farideh Alvarenga MA,CCC-SLP Speech and Language Pathologist           SLP Outcome Measures (last 72 hours)      SLP Outcome Measures     Row Name 01/11/19 1330 01/09/19 1100          SLP Outcome Measures    Outcome Measure Used?  Adult NOMS  -OC  Adult NOMS  -OC        Adult FCM Scores    FCM Chosen  Swallowing  -OC  Swallowing  -OC     Swallowing FCM Score  7  -OC  5  -OC       User Key  (r) = Recorded By, (t) = Taken By, (c) = Cosigned By    Initials Name Effective Dates    Farideh Alvarenga MA,RENÉE-SLP 06/08/18 -            Time Calculation:   Time Calculation- SLP     Row Name 01/11/19 2659              Time Calculation- SLP    SLP Start Time  1330  -OC      SLP Received On  01/11/19  -OC        User Key  (r) = Recorded By, (t) = Taken By, (c) = Cosigned By    Initials Name Provider Type    OC Farideh Ramirez MA,CCC-SLP Speech and Language Pathologist          Therapy Charges for Today     Code Description Service Date Service Provider Modifiers Qty    [id number] HC ST TREATMENT SWALLOW 3 1/11/2019 Farideh Ramirez MA,CCC-SLP GN 1               Farideh Ramirez MA,RENÉE-SLP  1/11/2019

## 2019-01-11 NOTE — PLAN OF CARE
Problem: Patient Care Overview  Goal: Plan of Care Review  Outcome: Ongoing (interventions implemented as appropriate)   01/11/19 1330   OTHER   Outcome Summary Re-eval of swallow completed. No overt s/s aspiration with thin via cup or straw. Recommend uprgrade to to thin liquids and regular textures, meds whole with thin/puree. ST to s/o at this time. Please re-consult as appropriate.    Coping/Psychosocial   Plan of Care Reviewed With patient

## 2019-01-11 NOTE — PLAN OF CARE
Problem: Patient Care Overview  Goal: Plan of Care Review  Outcome: Ongoing (interventions implemented as appropriate)   01/11/19 4604   OTHER   Outcome Summary Pt without complaints. Had one episode of confusion where he got out of bed trying to get to BSC but quickly reoriented. Tolerating BiPAP while sleeping. VSS. Afebrile.    Coping/Psychosocial   Plan of Care Reviewed With patient   Plan of Care Review   Progress no change

## 2019-01-11 NOTE — PROGRESS NOTES
"      [facility]         Dr Turner Sayied   LOS: 5 days   Patient Care Team:  Jus Durham MD as PCP - General (Family Medicine)    Chief Complaint: Acute on chronic respiratory failure with CO2 narcosis COPD exacerbation and acute hyperkalemia    Interval History: Continues to improve every day.  No overnight issues reported.  Awaiting bed to transfer out of the ICU.      REVIEW OF SYSTEMS:   Ventilator/Non-Invasive Ventilation Settings (From admission, onward)    Start     Ordered    01/07/19 0603  NIPPV (CPAP or BIPAP)  Until Discontinued     Question Answer Comment   Indication: Acute Respiratory Failure    Type: AVAPS/PC/PS    NIPPV Mask Interface Full face mask    Backup Rate 10    Target VT (mL) 500    EPAP/PEEP (cm H2O) 6    Min Pressure (cm H2O) 10    Max Pressure (cm H2O) 30    Titrate for SPO2 88% - 92%        01/07/19 0603    01/07/19 0305  NIPPV (CPAP or BIPAP)  Until Discontinued,   Status:  Canceled     Question Answer Comment   Indication: Acute Respiratory Failure    Type: BIPAP    NIPPV Mask Interface Full face mask    IPAP 24    EPAP 7    Oxygen FIO2    FIO2 % 40    Tidal Volume 500    Breath Rate 24    Titrate for SPO2 88% - 92%        01/07/19 0306        No chest pain or shortness of breath.    Vital Signs  Temp:  [97.9 °F (36.6 °C)-98.5 °F (36.9 °C)] 98.1 °F (36.7 °C)  Heart Rate:  [53-78] 60  Resp:  [16-18] 16  BP: (134-164)/() 159/99  FiO2 (%):  [40 %] 40 %    Intake/Output Summary (Last 24 hours) at 1/11/2019 1036  Last data filed at 1/11/2019 0951  Gross per 24 hour   Intake 960 ml   Output 2700 ml   Net -1740 ml     Flowsheet Rows      First Filed Value   Admission Height  182.9 cm (72\") Documented at 01/06/2019 2100   Admission Weight  105 kg (231 lb 9.6 oz) Documented at 01/06/2019 2100          Physical Exam:   General Appearance:   Awake alert no distress.  Oriented ×3    Lungs:    Diminished breath sounds with rhonchi and wheeze bilaterally     Heart:    " Regular rhythm and normal rate, normal S1 and S2, no            murmur, no gallop, no rub, no click   Chest Wall:    No abnormalities observed   Abdomen:     Normal bowel sounds, no masses, no organomegaly, soft        non-tender, non-distended, no guarding, no rebound                tenderness   Extremities:   Moves all extremities well, trace edema, no cyanosis, no             redness     Results Review:        Results from last 7 days   Lab Units  01/11/19   0437  01/09/19   0328  01/08/19   0436   SODIUM mmol/L  142  141  142   POTASSIUM mmol/L  4.6  4.2  4.8   CHLORIDE mmol/L  97*  95*  96*   CO2 mmol/L  38.2*  35.4*  35.0*   BUN mg/dL  22  30*  26*   CREATININE mg/dL  1.05  1.12  1.14   GLUCOSE mg/dL  120*  130*  155*   CALCIUM mg/dL  8.8  9.2  9.2     Results from last 7 days   Lab Units  01/07/19   0631  01/06/19   2156   TROPONIN T ng/mL  0.181*  0.173*     Results from last 7 days   Lab Units  01/11/19   0437  01/09/19   0328  01/08/19   0436   WBC 10*3/mm3  8.55  8.76  8.73   HEMOGLOBIN g/dL  9.9*  9.8*  10.5*   HEMATOCRIT %  35.9*  33.0*  34.7*   PLATELETS 10*3/mm3  193  280  236     Results from last 7 days   Lab Units  01/06/19   2156   INR   1.30*   APTT seconds  31.8                 Results from last 7 days   Lab Units  01/07/19   0737   PH, ARTERIAL pH units  7.301*   PO2 ART mm Hg  86.2   PCO2, ARTERIAL mm Hg  81.2*   HCO3 ART mmol/L  40.0*       I reviewed the patient's new clinical results.  I personally viewed and interpreted the patient's CXR        Medication Review:     carvedilol 12.5 mg Oral Q12H   docusate sodium 100 mg Oral BID   furosemide 40 mg Oral Daily   heparin (porcine) 5,000 Units Subcutaneous Q8H   insulin lispro 0-20 Units Subcutaneous 4x Daily With Meals & Nightly   ipratropium-albuterol 3 mL Nebulization 4x Daily - RT   methylPREDNISolone sodium succinate 20 mg Intravenous Q8H   nicotine 1 patch Transdermal Q24H   QUEtiapine 25 mg Oral Nightly            ASSESSMENT:   PCCM  Problems  Acute on chronic respiratory failure, NIV  CO2 Narcosis  Elevated troponin  CLAUDIA with severe hyperkalemia  Severe COPD with exacerbation  Chronic elevated right hemidiaphragm  Current smoker  SHAHZAD/OHV, pending study  Relevant Medical Diagnoses  CABG recent, 12/17/18  CAD  Nocturnal hypoxia  EF 24%          PLAN:  Off BiPAP and tolerating well nasal cannula oxygen.  Patient with severe COPD and admitted with CO2 narcosis.  I believe his mortality and morbidity would be improved if patient goes on trilogy home ventilator.  I don't think BiPAP would be adequate due to patient's ventilation requirement.  Trilogy will reduce his readmission hospital rate and improve his quality of life also.  Trilogy can be used as needed and with sleep at home.  I have asked  to order Trilogy.  We will see if he can get Trilogy during hospitalization.  Continue steroids and bronchodilators.  Wean steroid as tolerated  Hyperkalemia improved post diuresis.    Echo with EF 25%.  Continue Lasix per cardiology   no evidence to suggest infection as such  Agitation improved on Seroquel.  Will continue Seroquel at bedtime  Blood glucose management  Discussed plan of care with the rounding team  Awaiting bed to transfer out of the ICU    Johnny Betts MD  01/11/19  10:36 AM

## 2019-01-11 NOTE — DISCHARGE INSTR - ACTIVITY
Resume activity as tolerated       1) No driving for 5 days and no when no longer taking Narcotics  2) May shower/sponge bath today  3) Do not lift/push/pull more than 5 pounds.

## 2019-01-11 NOTE — PROGRESS NOTES
Continued Stay Note  [facility]     Patient Name: Fredis Lockwood  MRN: [id number]  Today's Date: 1/11/2019    Admit Date: 1/6/2019    Discharge Plan     Row Name 01/11/19 1606       Plan    Plan  Home with [facility] and a Trilogy from [facility]    Patient/Family in Agreement with Plan  yes    Plan Comments  Spoke with [facility] and they are still trying to get HH orders for the patient. Nina[facility] here to assist with Trilogy.        Discharge Codes    No documentation.             Deshawn Kauffman RN

## 2019-01-11 NOTE — DISCHARGE SUMMARY
HOSPITAL DISCHARGE SUMMARY    Patient Name: Fredis Lockwood  Age/Sex: 68 y.o. male  : 1950  MRN: [id number]    Encounter Provider: Fredis Collado MD  Referring Provider: Lio Brown MD  Place of Service: [facility] CARDIOLOGY  Patient Care Team:  Jus Durham MD as PCP - General (Family Medicine)         Date of Discharge:  2019     Date of Admit: 2019        Discharge Diagnosis:     Tobacco abuse    S/P CABG (coronary artery bypass graft)    Altered mental status  Hypertension  Coronary artery disease  COPD with exacerbation  Respiratory failure  CO2 narcosis  Hospital Course: The patient is a 68-year-old black male with a history of coronary artery disease.  He was recently hospitalized in 2018 with unstable angina pectoris.  He underwent bypass surgery on  with ultimate discharge from the .  He was seen back in the office by Amy Yadav on  doing well.  He presented to the emergency room on the evening of 2019 with altered mental status.  When he arrived in the emergency room his O2 saturations were in the 50-60 range with supplemental oxygen he improved.  His initial CT scan of laboratory work other than an elevated proBNP was unremarkable.  Hemoglobin as expected postop was down at 9.9.  The pulmonologist were consulted felt that the patient had acute on chronic respiratory failure with CO2 narcosis.  He was suffering from severe COPD with exacerbation and chronic elevated right hemidiaphragm.  Unfortunately he continued to smoke cigarettes.  He was treated with noninvasive ventilation and over the course of the 24 hours he improved significantly.  He remained on inhalers as well as steroid therapy and is now being discharged home with Trilogy.  He will follow-up with Dr. Betts per his recommendation with steroid weaning.  During the  course of his hospitalization from a cardiac standpoint there were no significant issues other than hypertension that was addressed.  He previously had been on amiodarone but this will not be restarted.  He did not have atrial fibrillation nor did he have any tachyarrhythmias.    Procedures Performed:             Consults:  Consults     Date and Time Order Name Status Description    1/7/2019 0302 Inpatient Pulmonology Consult Completed     1/6/2019 2242 LCG (on-call MD unless specified) Completed     12/17/2018 1209 Inpatient Pulmonology Consult Completed     12/14/2018 1012 Inpatient Cardiothoracic Surgery Consult Completed           Pertinent Test Results:    Results from last 7 days   Lab Units  01/11/19   0437  01/09/19   0328  01/08/19   0436   SODIUM mmol/L  142  141  142   POTASSIUM mmol/L  4.6  4.2  4.8   CHLORIDE mmol/L  97*  95*  96*   CO2 mmol/L  38.2*  35.4*  35.0*   BUN mg/dL  22  30*  26*   CREATININE mg/dL  1.05  1.12  1.14   GLUCOSE mg/dL  120*  130*  155*   CALCIUM mg/dL  8.8  9.2  9.2       Results from last 7 days   Lab Units  01/07/19   0631  01/06/19   2156   TROPONIN T ng/mL  0.181*  0.173*     Results from last 7 days   Lab Units  01/11/19   0437   WBC 10*3/mm3  8.55   HEMOGLOBIN g/dL  9.9*   HEMATOCRIT %  35.9*   PLATELETS 10*3/mm3  193     Results from last 7 days   Lab Units  01/06/19   2156   INR   1.30*   APTT seconds  31.8             Results from last 7 days   Lab Units  01/09/19   0328  01/07/19   0631  01/06/19   2156   PROBNP pg/mL  2,844.0*  5,516.0*  5,650.0*               Discharge Medications     Discharge Medications      New Medications      Instructions Start Date   nicotine 14 MG/24HR patch  Commonly known as:  NICODERM CQ   1 patch, Transdermal, Every 24 Hours Scheduled         Changes to Medications      Instructions Start Date   furosemide 80 MG tablet  Commonly known as:  LASIX  What changed:  how much to take   40 mg, Oral, Daily      potassium chloride 20 MEQ CR  tablet  Commonly known as:  K-DUR,KLOR-CON  What changed:  how much to take   20 mEq, Oral, Daily, On days you take your furosemide (water pill)         Continue These Medications      Instructions Start Date   albuterol sulfate  (90 Base) MCG/ACT inhaler  Commonly known as:  VENTOLIN HFA   2 puffs, Inhalation, Every 4 Hours PRN      aspirin 81 MG EC tablet   81 mg, Oral, Daily      atorvastatin 40 MG tablet  Commonly known as:  LIPITOR   40 mg, Oral, Nightly      budesonide-formoterol 160-4.5 MCG/ACT inhaler  Commonly known as:  SYMBICORT   2 puffs, Inhalation, 2 Times Daily - RT      carvedilol 6.25 MG tablet  Commonly known as:  COREG   12.5 mg, Oral, Every 12 Hours      cholecalciferol 400 units tablet  Commonly known as:  VITAMIN D3   400 Units, Oral, Daily      losartan 25 MG tablet  Commonly known as:  COZAAR   25 mg, Oral, Every 24 Hours Scheduled      MULTIVITAMINS PO   1 tablet, Oral, Daily      sennosides-docusate sodium 8.6-50 MG tablet  Commonly known as:  SENOKOT-S   2 tablets, Oral, Nightly      tamsulosin 0.4 MG capsule 24 hr capsule  Commonly known as:  FLOMAX   1 capsule, Oral, Daily      tiotropium 18 MCG per inhalation capsule  Commonly known as:  SPIRIVA HANDIHALER   1 capsule, Inhalation, Daily - RT      vitamin B-12 1000 MCG tablet  Commonly known as:  CYANOCOBALAMIN   1,000 mcg, Oral, Daily      zolpidem 10 MG tablet  Commonly known as:  AMBIEN   10 mg, Oral, Nightly PRN         Stop These Medications    amiodarone 200 MG tablet  Commonly known as:  PACERONE              Discharge Diet: Heart Healthy      Activity at Discharge:  personal care      Discharge disposition: Home    Discharge Activity Instructions:     1) No driving for 5 days and no when no longer taking Narcotics  2) May shower/sponge bath today  3) Do not lift/push/pull more than 5 pounds.       Follow-up Appointments  Future Appointments   Date Time Provider Department Center   1/16/2019  7:30 PM [facility] SLEEP ROOMS   [facility]   1/30/2019  1:15 PM Fredis Collado MD [facility] CD [facility] None   2/1/2019 11:30 AM Servando Diez MD [facility] CTS [facility] None     Follow-up Information     Jus Durham MD .    Specialty:  Family Medicine  Contact information:  [address] [id number] [phone]                        Fredis Collado MD  01/11/19  4:15 PM

## 2019-01-12 NOTE — OUTREACH NOTE
Prep Survey      Responses   Facility patient discharged from?  [place]   Is patient eligible?  Yes   Discharge diagnosis  Tobacco abuse,  S/P CABG (coronary artery bypass graft),  Altered mental status,  COPD with exacerbation   Does the patient have one of the following disease processes/diagnoses(primary or secondary)?  COPD/Pneumonia   What is the Home health agency?   [facility]   Is there a DME ordered?  Yes   What DME was ordered?  Triology from [facility]    Prep survey completed?  Yes          Edyta Ho RN

## 2019-01-13 NOTE — OUTREACH NOTE
COPD/PN Week 1 Survey      Responses   Facility patient discharged from?  [facility] [place]   Does the patient have one of the following disease processes/diagnoses(primary or secondary)?  COPD/Pneumonia   Is there a successful TCM telephone encounter documented?  No   Was the primary reason for admission:  COPD exacerbation   Week 1 attempt successful?  No   Unsuccessful attempts  Attempt 1          Chani Vaca RN

## 2019-01-14 NOTE — OUTREACH NOTE
COPD/PN Week 1 Survey      Responses   Facility patient discharged from?  [place]   Does the patient have one of the following disease processes/diagnoses(primary or secondary)?  COPD/Pneumonia   Is there a successful TCM telephone encounter documented?  No   Was the primary reason for admission:  COPD exacerbation   Week 1 attempt successful?  Yes   Call start time  1733   Call end time  1742   Discharge diagnosis  Tobacco abuse,  S/P CABG (coronary artery bypass graft),  Altered mental status,  COPD with exacerbation   Meds reviewed with patient/caregiver?  Yes   Is the patient having any side effects they believe may be caused by any medication additions or changes?  No   Comments regarding appointments  appt with YULI Greene on 1/18/19   Does the patient have a primary care provider?   Yes   Does the patient have an appointment with their PCP or pulmonologist within 7 days of discharge?  Yes   Has the patient kept scheduled appointments due by today?  N/A   What is the Home health agency?   [facility]   Has home health visited the patient within 72 hours of discharge?  No   Home health comments  pt has not heard from [facility] yet, plans to call soon, if does not hear from them   What DME was ordered?  Triology from [employer] , large mask   Has all DME been delivered?  No   Psychosocial issues?  No   Did the patient receive a copy of their discharge instructions?  Yes   Nursing interventions  Reviewed instructions with patient   What is the patient's perception of their health status since discharge?  Improving   Nursing Interventions  Nurse provided patient education   Is the patient/caregiver able to teach back the hierarchy of who to call/visit for symptoms/problems? PCP, Specialist, Home health nurse, Urgent Care, ED, 911  Yes   Is the patient able to teach back COPD zones?  Yes   Nursing interventions  Education provided on various zones   Patient reports what zone on this call?  Green Zone   Green  Zone  Reports doing well, Breathing without shortness of breath, Usual activity and exercise level, Usual amount of phlegm/mucus without difficulty coughing up, Sleeping well, Appetite is good   Green Zone interventions:  Take daily medications, Continue regular exercise/diet plan, Use oxygen as prescribed, Avoid indoor/outdoor triggers   Week 1 call completed?  Yes          Sona Coronel RN

## 2019-01-15 NOTE — TELEPHONE ENCOUNTER
01/15/19  12:41 PM  Fredis Lockwood  1950    Home Phone [phone]   Mobile [phone]       Fredis Lockwood is a patient of Dr Collado.  Sherly with [facility] calling in today to let us know that patient was admitted to home health today.  He was noted to have an elevated bp of 168/104 hr 65 o2 sat 98%.  Sherly stated the patient is Asymptomatic and she stated she went through his medication planner and made sure the patient was taking his meds, and he is.    Verified home cardiac meds with Sherly  Furosemide 80mg 1/2 daily  Carvedilol 6.25mg 2 tabs BID  Losartan 25mg dialy      Does he need a med change?    Sherly can be reached at [phone]  Iesha Beaulieu RN  Triage nurse

## 2019-01-15 NOTE — TELEPHONE ENCOUNTER
Sherly with [facility] notified and new dose sent to patients pharmacy.  Sherly will let the patient know.  Iesha Beaulieu RN  Triage nurse

## 2019-01-16 NOTE — PROGRESS NOTES
Date of Office Visit: 2019  Encounter Provider: YULI Desir  Place of Service: [facility] CARDIOLOGY  Patient Name: Fredis Lockwood  :1950      Subjective:     Chief Complaint:  CAD s/p CABG x 7, Ischemic Cardiomyopathy    History of Present Illness:  Fredis Lockwood is a pleasant 68 y.o. male who I have seen once before.  Outside records have been obtained and reviewed by me.  He has a past medical history of hypertension, hyperlipidemia, abdominal aortic aneurysm, BPH and tobacco use.     The patient presented on 18 to see Dr. Collado per the request of Dr. Jus Durham, the patient's PCP.  He had an abnormal electrocardiogram and exertional dyspnea for which cardiology was consulted.  His symptoms had been going on for the past 2 years in regards to exertional dyspnea but had worsened over the last few months.  He didn't really have any angina symptoms.  Dr. Collado felt that due to his long-term smoky probably had underlying COPD for which he was using inhalers inconsistently but those were helpful when he did use it.  Climbing stairs was the most difficult time with his shortness of breath.  He didn't have any orthopnea or PND.  There were also no palpitations or peripheral edema reported.  Due to his risk factors for coronary disease including continued tobacco use, hypertension, hyperlipidemia as well as family history Dr. Collado ordered a stress test. He underwent stress testing as an outpatient which revealed a large sized moderately severe area of ischemia in the lateral wall with a calculated EF of 24%. The patient was admitted on 18 disc cardiac catheterization which revealed multivessel CAD and a depressed ejection fraction.  Cardiothoracic surgery was consulted and patient want CABG ×7  (LIMA to LAD, Sequential vein graft to marginal branch and PDA, patent grafts to D1, OM1, sequential vein graft to OM 2 and distal OM3) on  12/17/18.  During the procedure he received 2 units of platelets for intraop complication platelet coagulopathy. Pulmonology was consulted on 12/17/18 postop to assist with the later management due to patient's severe COPD and postop hypoxia and hypercapnic respiratory failure.  Patient was successfully extubated on 12/18/18 and transitioned to 2 L O2.  He was started on dual nebs and Symbicort.  Postop the patient required vasopressors and inotropes.  Patient was still on low-dose milrinone postop day 2 on 12/19/18. He developed some tachycardia and an new LBBB compared to RBBB on 12/19/18 postop and was started on beta blocker and amiodarone by cardiothoracic surgery.  He was noted to have an elevated right hemidiaphragm which appeared to be chronic.  He developed hematuria post Curry removal which cleared up during this hospitalization. By  the end of his hospitalization he was able to participate with PT and ambulate with oxygen.  An overnight pulse ox was performed revealed nocturnal desats within one hour of study less than 89%.  Pulmonary ordered walking oximetry and patient was able to means sats greater than 89% while ambulated through did not qualify for O2 during the day.  On 12/23/18 he was felt stable for discharge.  He as discharged home on aspirin 81 mg daily, amiodarone 200 mg twice a day, carvedilol 6.25 mg twice a day, Lasix 80 mg daily with potassium supplementation, Losartan 25 mg daily and atorvastatin 40 mg daily as well as Spiriva and Symbicort.  Norvasc and Viagra were discontinued at discharge.      I saw the patient on 1/2/19 for a one-week hospital follow-up post cardiac surgery.  He was doing pretty well at that time and was in sinus rhythm so I stopped his amiodarone. The patient denied any chest pain or incisional discomfort or popping and clicking of sternum or sternal instability.  He denied any fevers, chills or drainage of his incision sites.  He reported his shortness of breath is  getting better since hospital discharge.  He denied any PND or orthopnea.  He reported he was able to lay flat and sleep on one pillow at nighttime.  He denied any palpitations or elevated heart rate unless he feels a little anxious and which taken a couple deep breaths to make the sensation go away. He did not report feeling an irregular heart beat. He denied any worsening swelling and reports he is supposed to wearing compression stockings but has not started using them yet.  He denied any dizziness, lightheadedness, syncope or near-syncope.  He reported his energy levels were improving.  He was wearing 1 liter of oxygen at night time at that time per pulmonary and had a sleep study scheduled.  He reported that he hasn't resumed aspirin since hospital discharge so I resumed it.  He reported he hasn't checked his blood pressure at home and was not weighing himself daily at home which I advised to do both.  He did report that he had not smoked since hospital discharge.  The patient did not have a scheduled follow-up with Dr. Diez yet at that time so I had him go to their office to make one after his appt.  He had a follow up scheduled with  his PCP Dr. Durham on 1/4/19 for hospital f/u and repeat labs.         On 1/6/19 he presented to the ED with altered mental status.  Upon arrival to the ED his O2 saturations were in the 50-60% range with supplemental oxygen he improved.  He was a little anemic postop as expected. His CT and other labs were unremarkable aside from an elevated proBNP (which was improved from prior).  Pulmonology felt that his altered mental status was related to acute on chronic respiratory failure with CO2 narcosis d/t COPD chronic elevated right hemidiaphragm.  He was still smoking cigarettes.  He was treated with noninvasive ventilation and he improved significantly within 24 hours.  He was treated with inhalers and steroid therapy and was being discharged home with Trilogy ventilation.  He  was supposed to have a sleep study as well.  Cardiac standpoint during his hospitalization he did well.  His blood pressure was elevated and that was addressed.  He was discharged home on 1/11/19 cardiac medical regimen including aspirin 81 mg daily, atorvastatin 40 mg daily,  Furosemide 40 mg daily with potassium supplement, Carvedilol 12.5 mg twice a day, Losartan 25 mg daily. He was also given nicotine patches to aid in smoking cessation.    Patient is here today for a 1 week hospital follow-up visit.  He started with home health yesterday.  Home health nurse called reporting elevated blood pressure 160/100.  Losartan was increased from 25 to 50 mg on 1/17/19.  He continues to deny any chest pain.  He denies any palpitations, worsening swelling of his extremities and she is now wearing compression stockings.  He denies any dizziness, lightheadedness, syncope, near syncope, PND or orthopnea.  Parts chronic shortness of breath which is unchanged.  He does not feel any more short of breath and prior.  He is using the home Trilogy machine at nighttime and is wearing 2 L of oxygen during the day.  He is supposed to be following up with Dr. Betts. He reports that he is now using the nicotine patches and hasn't smoked cigarettes since he was recently discharged from the second hospitalization.  He reports his weight is steady and he isn't gaining any weight.  He denies any drainage, instability of his sternal wound, fevers or chills.  He denies any cough.      Previous Cardiac Testing/ Procedures:  12/14/18 Cardiac Catheterization:  Hemodynamics:  1.  Cardiac output (Toi): 3.76 L/m  2.  Cardiac index (Toi) 1.76 L/m/m²  3.  Right atrium: A wave 11, V wave 9, mean 8  4.  Right ventricle: 38/9  5.  Pulmonary artery: 38/20, mean 32,   6  PCW: A wave 15 V wave 20, mean 8  7.  Aorta: 131/78, mean 102  8.  Left ventricle: 138/8     Cineangiography:  1.  Left main: The left main coronary artery is large and minimally  calcified.  The artery bifurcates into the left anterior descending and left circumflex coronary arteries.  2.  LAD: The left anterior descending coronary artery is a large vessel that is moderately ectatic.  There is a 99% stenosis present in the midsegment of the artery.  The remainder of the vessel is ectatic but patent.  Some of the apical branches appear to be subtotally occluded.  The diagonal branches arising from the mid segment appeared to be of medium to small caliber.  The major diagonals also narrowed approximate 60%.  3.  LCx: The left circumflex coronary artery is a large codominant vessel.  The artery is diffusely ectatic throughout its course.  The artery is completely occluded distally prior to the origin of the terminal marginal branches.  The first marginal branch is of medium caliber arising from the mid segment without critical stenosis.  The second marginal branch arises distally it is of medium to large caliber and occluded at its terminal portion.  The third marginal branch arises distally and is occluded at its origin but fills via collateral circulation.  4.  RCA: The right coronary artery is diffusely diseased and ectatic throughout.  The mid segment of the artery is narrowed 60%.  The distal segment is narrowed 90%.  The posterior descending and posterolateral branches are medium caliber with a common trunk narrowing of 50%.  The right ventricular branch is occluded at its origin but fills via collateral circulation.     Left ventriculography: The overall size of the ventricle is normal.  The global contractility of the ventricle is abnormal.  There is anterolateral as well as inferior hypokinesia.  The estimated ejection fraction is approximately 30%.     Assessment: Ischemic heart disease:  Etiology: Coronary atherosclerosis  Anatomy: Severe three-vessel coronary disease  Physiology: Exertional dyspnea, abnormal stress test  Functional status: Severely compromised  Prognosis:  Guarded  Recommendation: Surgical consultation    12/7/18 Stress test:  · Myocardial perfusion imaging indicates a large-sized, moderately severe area of ischemia located in the lateral wall.  · Left ventricular ejection fraction is severely reduced (Calculated EF = 24%). There is global hypokinesis and lateral dyskinesis.  · Impressions are consistent with a high risk study.         Past Medical History:   Diagnosis Date   • AAA (abdominal aortic aneurysm) (CMS/[facility])    • Abnormal EKG    • Borderline diabetes    • BPH (benign prostatic hyperplasia)     w/Urinary Obstruction   • CAD (coronary artery disease)     S/p CABG 12/17/18   • Chronic pain of right lower extremity    • COPD (chronic obstructive pulmonary disease) (CMS/[facility])    • Dilated cardiomyopathy (CMS/[facility])    • CASTRO (dyspnea on exertion)    • Gastric ulcer     S/p Sx For Rupture   • Hyperlipidemia     Controlled w/Meds   • Hypertension     Controlled w/Meds   • Inguinal hernia     Hx Repair   • Metabolic disease    • Nocturia Hx   • Presbyopia    • RBBB (right bundle branch block)    • Right bundle branch block    • Right hip pain    • SOB (shortness of breath)     Associated w/CAD     Past Surgical History:   Procedure Laterality Date   • CARDIAC CATHETERIZATION N/A 12/14/2018    Procedure: Coronary angiography;  Surgeon: Fredis Collado MD;  [facility];  Service: Cardiovascular   • CARDIAC CATHETERIZATION N/A 12/14/2018    Procedure: Left heart cath;  Surgeon: Fredis Collado MD;  Location:  [facility] CATH INVASIVE LOCATION;  Service: Cardiovascular   • CARDIAC CATHETERIZATION N/A 12/14/2018    Procedure: Left ventriculography;  Surgeon: Fredis Collado MD;  [facility];  Service: Cardiovascular   • CARDIAC CATHETERIZATION N/A 12/14/2018    Procedure: Right heart cath;  Surgeon: Fredis Collado MD;  Location:  [facility] CATH INVASIVE LOCATION;  Service: Cardiovascular   • COLONOSCOPY   05/2012    WNL   • CORONARY ARTERY BYPASS GRAFT N/A 12/17/2018    Procedure: INTRAOP ALEISHA; CORONARY ARTERY BYPASS X7 WITH LEFT INTERNAL MAMMARY ARTERY GRAFT AND UTILIZING ENDOSCOPICALLY HARVESTED LEFT SAPHENOUS VEIN; PRP;  Surgeon: Servando Diez MD;  Location: [facility]  Service: Cardiothoracic   • INGUINAL HERNIA REPAIR     • OTHER SURGICAL HISTORY      For Ruptured Ulcer   • VASECTOMY       Outpatient Medications Prior to Visit   Medication Sig Dispense Refill   • albuterol sulfate HFA (VENTOLIN HFA) 108 (90 Base) MCG/ACT inhaler Inhale 2 puffs Every 4 (Four) Hours As Needed for Wheezing or Shortness of Air. 1 inhaler 2   • aspirin 81 MG EC tablet Take 81 mg by mouth daily.     • atorvastatin (LIPITOR) 40 MG tablet Take 1 tablet by mouth Every Night. 30 tablet 1   • budesonide-formoterol (SYMBICORT) 160-4.5 MCG/ACT inhaler Inhale 2 puffs 2 (Two) Times a Day. 1 inhaler 2   • cholecalciferol (VITAMIN D3) 400 units tablet Take 400 Units by mouth Daily.     • furosemide (LASIX) 80 MG tablet Take 0.5 tablets by mouth Daily. 45 tablet 1   • losartan (COZAAR) 50 MG tablet Take 1 tablet by mouth Daily. 30 tablet 3   • Multiple Vitamin (MULTIVITAMINS PO) Take 1 tablet by mouth daily.     • nicotine (NICODERM CQ) 14 MG/24HR patch Place 1 patch on the skin as directed by provider Daily. 30 patch 0   • potassium chloride (K-DUR,KLOR-CON) 20 MEQ CR tablet Take 1 tablet by mouth Daily. On days you take your furosemide (water pill) 30 tablet 11   • predniSONE (DELTASONE) 10 MG tablet Take 4 tabs daily x 3 days, then take 3 tabs daily x 3 days, then take 2 tabs daily x 3 days, then take 1 tab daily x 3 days 31 tablet 0   • sennosides-docusate sodium (SENOKOT-S) 8.6-50 MG tablet Take 2 tablets by mouth Every Night. 60 tablet 0   • tamsulosin (FLOMAX) 0.4 MG capsule 24 hr capsule Take 1 capsule by mouth Daily. 90 capsule 3   • tiotropium (SPIRIVA HANDIHALER) 18 MCG per inhalation capsule Place 1 capsule into inhaler and  inhale Daily. 30 capsule 2   • vitamin B-12 (CYANOCOBALAMIN) 1000 MCG tablet Take 1,000 mcg by mouth Daily.     • zolpidem (AMBIEN) 10 MG tablet Take 10 mg by mouth At Night As Needed for Sleep.     • carvedilol (COREG) 6.25 MG tablet Take 2 tablets by mouth Every 12 (Twelve) Hours.       No facility-administered medications prior to visit.        Allergies as of 01/18/2019   • (No Known Allergies)     Social History     Socioeconomic History   • Marital status: Single     Spouse name: Not on file   • Number of children: 3   • Years of education: Not on file   • Highest education level: Not on file   Social Needs   • Financial resource strain: Not on file   • Food insecurity - worry: Not on file   • Food insecurity - inability: Not on file   • Transportation needs - medical: Not on file   • Transportation needs - non-medical: Not on file   Occupational History   • Not on file   Tobacco Use   • Smoking status: Former Smoker     Types: Cigarettes   • Smokeless tobacco: Never Used   Substance and Sexual Activity   • Alcohol use: Yes     Alcohol/week: 3.6 oz     Types: 6 Cans of beer per week     Binge frequency: Weekly     Comment: 6 Beers/Wk   • Drug use: No   • Sexual activity: Defer     Comment: Vasectomy   Other Topics Concern   • Not on file   Social History Narrative   • Not on file     Family History   Problem Relation Age of Onset   • Sudden death Mother    • Heart disease Father    • Heart attack Father    • Hypertension Father    • Diabetes Father    • Suicide Attempts Brother      Review of Systems   Constitution: Positive for malaise/fatigue (improving per patient). Negative for chills and fever.   HENT: Negative for ear pain, hearing loss, nosebleeds and sore throat.    Eyes: Negative for double vision, pain, vision loss in left eye and vision loss in right eye.   Cardiovascular: Positive for leg swelling. Negative for chest pain, claudication, dyspnea on exertion, irregular heartbeat, near-syncope,  "orthopnea, palpitations, paroxysmal nocturnal dyspnea and syncope.   Respiratory: Positive for shortness of breath (chronic-unchanged- wearing day time O2). Negative for cough, snoring and wheezing.    Endocrine: Negative for cold intolerance and heat intolerance.   Hematologic/Lymphatic: Negative for bleeding problem.   Skin: Negative for color change, itching, poor wound healing, rash and unusual hair distribution.   Musculoskeletal: Negative for joint pain and joint swelling.   Gastrointestinal: Negative for abdominal pain, diarrhea, hematochezia, melena, nausea and vomiting.   Genitourinary: Negative for decreased libido, frequency, hematuria, hesitancy and incomplete emptying.   Neurological: Negative for excessive daytime sleepiness, dizziness, headaches, light-headedness, loss of balance, numbness, paresthesias and seizures.   Psychiatric/Behavioral: Negative for depression.          Objective:     Vitals:    01/18/19 1030   BP: 144/72   Pulse: 74   Weight: 99.8 kg (220 lb)   Height: 177.8 cm (70\")     Body mass index is 31.57 kg/m².    PHYSICAL EXAM:  Physical Exam   Constitutional: He is oriented to person, place, and time. He appears well-developed and well-nourished. No distress. Nasal cannula in place.   Obese   HENT:   Head: Normocephalic and atraumatic.   Eyes: Conjunctivae and EOM are normal. Pupils are equal, round, and reactive to light.   Neck: Neck supple. No JVD present. Carotid bruit is not present.   Cardiovascular: Normal rate, regular rhythm, normal heart sounds and intact distal pulses.   No murmur heard.  Pulses:       Radial pulses are 2+ on the right side, and 2+ on the left side.        Posterior tibial pulses are 2+ on the right side, and 2+ on the left side.        Pulmonary/Chest: Effort normal. No accessory muscle usage. No tachypnea. No respiratory distress. He has decreased breath sounds in the left upper field and the left lower field. He has no wheezes. He has no rhonchi. He has " no rales. He exhibits no tenderness.   Vertical mid sternal incision well healed and approximated  with no open areas, erythema, ecchymosis, swelling, warmth or drainage. There is no popping or clicking of the sternum or sensation of sternal wires felt on palpation. The incision is non-tender.      Chest tube puncture sites well healed and approximated  with no open areas, erythema, ecchymosis, swelling, warmth or drainage. There is some scabbing at puncture sites   Abdominal: Soft. Bowel sounds are normal. He exhibits no distension. There is no tenderness. There is no rebound.   Rounded, compressible   Musculoskeletal: Normal range of motion. He exhibits edema (1 + left lower extremity edema, non pitting right lower extremity edema).   Neurological: He is alert and oriented to person, place, and time.   Skin: Skin is warm, dry and intact. He is not diaphoretic. No erythema.   Psychiatric: He has a normal mood and affect. His speech is normal and behavior is normal. Judgment and thought content normal. Cognition and memory are normal.   Nursing note and vitals reviewed.        ECG 12 Lead  Date/Time: 1/18/2019 10:41 AM  Performed by: Amy Yadav APRN  Authorized by: Amy Yadav APRN   Comparison: compared with previous ECG from 1/19/2018  Similar to previous ECG  Comparison to previous ECG: Artifact present  Rhythm: sinus rhythm  Ectopy: PVCs  Rate: normal  BPM: 74  Conduction: right bundle branch block and LAFB  QRS axis: left  Clinical impression: abnormal ECG  Comments: Nonspecific t-abnormalities  Indication: CAD            Assessment:       Diagnosis Plan   1. Coronary artery disease involving native coronary artery of native heart with angina pectoris (CMS/[facility])     2. S/P CABG (coronary artery bypass graft)     3. Ischemic cardiomyopathy     4. Benign essential HTN     5. Elevated cholesterol     6. Abdominal aortic aneurysm (AAA) without rupture (CMS/[facility])     7. Chronic obstructive pulmonary  disease, unspecified COPD type (CMS/[facility])     8. Nocturnal hypoxemia         Plan:     1. Multivessel CAD- underwent CABG x 7 with Dr. Diez on 12/17/18.   Coronary Artery Disease  Assessment  • The patient has no angina    Subjective - Objective  • There is a history of previous coronary artery bypass graft on or around 12/17/2018  • Current antiplatelet therapy includes aspirin 81 mg  • The patient qualifies for cardiac rehabilitation, and has been referred to cardiac rehab        No angina or overt heart failure symptoms.   Patient previously referred to cardiac rehabilitation.  I advised him to resume his aspirin 81 mg daily.    2. Ischemic Cardiomyopathy: EF 30%. On ARB, Beta blocker and Diuretic.  Heart Failure  Assessment  • NYHA class II - There is slight limitation of physical activity. The patient is comfortable at rest, but physical activity results in fatigue, palpitations or shortness of breath.  • ACE inhibitor not prescribed for medical reasons  • Beta blocker prescribed  • Diuretics prescribed  • Angiotensin receptor blocker (ARB) prescribed  • The most recent ejection fraction is 30%  • Left ventricular function is moderately reduced by qualitative assessment    Plan  • The patient has received heart failure education on the following topics: dietary sodium restriction, medication instructions, smoking cessation, minimizing or avoiding NSAID use, symptom management, physical activity, weight monitoring and minimizing alcohol intake  • The heart failure care plan was discussed with the patient today including: continuing the current program and lifestyle modifications    Subjective/Objective  • The patient reports dyspnea (chronic-unchanged- wearing day time O2)  • Physical exam findings positive for peripheral edema.   • Physical exam findings negative for rales and elevated JVP.    Patient has chronic dyspnea from COPD. He denies any orthopnea.  He does have some leg swelling worse on his left leg  from  vein graft harvesting.  Now wearing compression stockings.  Lasix dose appears to have been dropped to 40 mg daily at hospital discharge 1/11/19. Coreg previously increased to 12.5 mg BID. Losartan increased to 50 mg 1/17/19 d/t HTN. Plan to increase Coreg to 25 mg BID at next visit with Dr. Collado.    3. Hypertension: Med changes made in hospital. Improved. Creatinine and potassium levels normal at discharge on 1/11/19. Repeat lab monitoring per PCP.  He does not check his blood pressure at home but his blood pressure is 144/72. On Coreg 12.5 mg BID,  Lasix 40 mg daily and Losartan increase to 50 mg on 1/17/19 d/t HTN.     4. Hyperlipidemia: 12/15/18 lipid panel revealed HDL 40 and LDL of 74.  Atorvastatin dose was increased during December 2018 hospitalization. Now on atorvastatin 40 mg. Repeat lipid panel and hepatic function panel should be completed by PCP in approx. 3 months.     5. Abdominal aortic aneurysm, 4.5cm by US in 2/18, focus on good b/p control. He does not check his blood pressure at home but his blood pressure is 140/76.     6. Tobacco abuse- was smoking in the bathroom 12/14/2018- nicotine patches ordered in hospital. Avoidance of tobacco discussed. Patient relapsed back to smoking after last OV with me 1/2/19. Nicotine patches reordered for the patient at discharge on 1/11/19.     7. Noctural hypoxemia: severe COPD, O2 ordered per pulmonology at . Has sleep study ordered outpatient for suspected sleep apnea. On inhalers and discharged on 1/11/19 with home ventilator (Trilogy) on 1/11/19.  Managed by pulmonology.    8 Anemia: expected post-op, Recent 1/6/19-1/11/19 labs H & H stable. Defer to PCP for repeat monitoring of CBC.     9. Leukocytosis: Resolved. WBC normal on 1/11/19    10. Elevated glucose levels: Hgba1c slightly elevated at 5.74%, has been as high as 6.00% as far as I can tell in 2016,  defer treatment/management to PCP          Follow up with Dr. Collado on 1/30/19,  unless otherwise needed sooner. Follow-up with Dr. Diez on 2/1/19.  I advised the patient to contact our office with any questions or concerns.         Your medication list           Accurate as of 1/18/19  3:41 PM. If you have any questions, ask your nurse or doctor.               CHANGE how you take these medications      Instructions Last Dose Given Next Dose Due   carvedilol 12.5 MG tablet  Commonly known as:  COREG  What changed:  medication strength      Take 1 tablet by mouth Every 12 (Twelve) Hours.          CONTINUE taking these medications      Instructions Last Dose Given Next Dose Due   albuterol sulfate  (90 Base) MCG/ACT inhaler  Commonly known as:  VENTOLIN HFA      Inhale 2 puffs Every 4 (Four) Hours As Needed for Wheezing or Shortness of Air.       aspirin 81 MG EC tablet      Take 81 mg by mouth daily.       atorvastatin 40 MG tablet  Commonly known as:  LIPITOR      Take 1 tablet by mouth Every Night.       budesonide-formoterol 160-4.5 MCG/ACT inhaler  Commonly known as:  SYMBICORT      Inhale 2 puffs 2 (Two) Times a Day.       cholecalciferol 400 units tablet  Commonly known as:  VITAMIN D3      Take 400 Units by mouth Daily.       furosemide 80 MG tablet  Commonly known as:  LASIX      Take 0.5 tablets by mouth Daily.       losartan 50 MG tablet  Commonly known as:  COZAAR      Take 1 tablet by mouth Daily.       MULTIVITAMINS PO      Take 1 tablet by mouth daily.       nicotine 14 MG/24HR patch  Commonly known as:  NICODERM CQ      Place 1 patch on the skin as directed by provider Daily.       potassium chloride 20 MEQ CR tablet  Commonly known as:  K-DUR,KLOR-CON      Take 1 tablet by mouth Daily. On days you take your furosemide (water pill)       predniSONE 10 MG tablet  Commonly known as:  DELTASONE      Take 4 tabs daily x 3 days, then take 3 tabs daily x 3 days, then take 2 tabs daily x 3 days, then take 1 tab daily x 3 days       sennosides-docusate sodium 8.6-50 MG  tablet  Commonly known as:  SENOKOT-S      Take 2 tablets by mouth Every Night.       tamsulosin 0.4 MG capsule 24 hr capsule  Commonly known as:  FLOMAX      Take 1 capsule by mouth Daily.       tiotropium 18 MCG per inhalation capsule  Commonly known as:  SPIRIVA HANDIHALER      Place 1 capsule into inhaler and inhale Daily.       vitamin B-12 1000 MCG tablet  Commonly known as:  CYANOCOBALAMIN      Take 1,000 mcg by mouth Daily.       zolpidem 10 MG tablet  Commonly known as:  AMBIEN      Take 10 mg by mouth At Night As Needed for Sleep.             Where to Get Your Medications      These medications were sent to [facility] - [place], [place] - [address] AT [facility] - [phone]  - [phone]   [address], [facility] [address]    Phone:  [phone]   · carvedilol 12.5 MG tablet         The above medication changes may not have been made by this provider.  Medication list was updated to reflect medications patient is currently taking including medication changes and discontinuations made by other healthcare providers.       I have reviewed this case with Dr. Collado. It has been a pleasure to participate in this patient's care. Please feel free to contact me with any questions or concerns.     Amy Yadav, YULI  01/02/2019       Dictated utilizing Dragon Dictation System.

## 2019-01-21 NOTE — TELEPHONE ENCOUNTER
Called pt secondary to referral for cardiac rehab. Pt says he is getting home health currently.  I provided my telephone number and encouraged him to call us when finished with HH at [phone].

## 2019-01-24 NOTE — TELEPHONE ENCOUNTER
Accepted phone call from daughter who wanted to schedule her dad for outpatient cardiac rehab.  I explained need for him to be finished with home health before starting cardiac rehab program (Medicare requirement).  Daughter will check into this and call back for appt.

## 2019-01-31 PROBLEM — J96.02 ACUTE HYPERCAPNIC RESPIRATORY FAILURE (HCC): Status: ACTIVE | Noted: 2019-01-01

## 2019-02-01 NOTE — PROGRESS NOTES
Clinical Pharmacy Services: Medication History    Fredis Lockwood is a 68 y.o. male presenting to [facility] for Acute hypercapnic respiratory failure (CMS/Formerly [facility]) [J96.02]    He  has a past medical history of AAA (abdominal aortic aneurysm) (CMS/[facility]ly [facility]), Abnormal EKG, Borderline diabetes, BPH (benign prostatic hyperplasia), CAD (coronary artery disease), Chronic pain of right lower extremity, COPD (chronic obstructive pulmonary disease) (CMS/[facility]), Dilated cardiomyopathy (CMS/[facility]), CASTRO (dyspnea on exertion), Gastric ulcer, Hyperlipidemia, Hypertension, Inguinal hernia, Metabolic disease, Nocturia (Hx), Presbyopia, RBBB (right bundle branch block), Right bundle branch block, Right hip pain, and SOB (shortness of breath).    Allergies as of 01/31/2019   • (No Known Allergies)       Medication information was obtained from: pharmacy  Pharmacy and Phone Number: [facility] [phone]    Prior to Admission Medications     Prescriptions Last Dose Informant Patient Reported? Taking?    aspirin 81 MG EC tablet 1/31/2019 Self Yes Yes    Take 81 mg by mouth daily.    albuterol sulfate HFA (VENTOLIN HFA) 108 (90 Base) MCG/ACT inhaler Unknown  No No    Inhale 2 puffs Every 4 (Four) Hours As Needed for Wheezing or Shortness of Air.    atorvastatin (LIPITOR) 40 MG tablet   No No    Take 1 tablet by mouth Every Night.    budesonide-formoterol (SYMBICORT) 160-4.5 MCG/ACT inhaler   No No    Inhale 2 puffs 2 (Two) Times a Day.    carvedilol (COREG) 12.5 MG tablet   No No    Take 1 tablet by mouth Every 12 (Twelve) Hours.    cholecalciferol (VITAMIN D3) 400 units tablet   Yes No    Take 400 Units by mouth Daily.    furosemide (LASIX) 80 MG tablet   No No    Take 0.5 tablets by mouth Daily.    losartan (COZAAR) 50 MG tablet   No No    Take 1 tablet by mouth Daily.    Multiple Vitamin (MULTIVITAMINS PO)  Self Yes No    Take 1 tablet by mouth daily.    nicotine (NICODERM CQ) 14 MG/24HR patch   No No    Place 1 patch on the  skin as directed by provider Daily.    potassium chloride (K-DUR,KLOR-CON) 20 MEQ CR tablet   No No    Take 1 tablet by mouth Daily. On days you take your furosemide (water pill)    predniSONE (DELTASONE) 10 MG tablet Unknown  No No    Take 4 tabs daily x 3 days, then take 3 tabs daily x 3 days, then take 2 tabs daily x 3 days, then take 1 tab daily x 3 days    sennosides-docusate sodium (SENOKOT-S) 8.6-50 MG tablet   No No    Take 2 tablets by mouth Every Night.    tamsulosin (FLOMAX) 0.4 MG capsule 24 hr capsule  Self No No    Take 1 capsule by mouth Daily.    tiotropium (SPIRIVA HANDIHALER) 18 MCG per inhalation capsule   No No    Place 1 capsule into inhaler and inhale Daily.    vitamin B-12 (CYANOCOBALAMIN) 1000 MCG tablet  Self Yes No    Take 1,000 mcg by mouth Daily.    zolpidem (AMBIEN) 10 MG tablet   Yes No    Take 10 mg by mouth At Night As Needed for Sleep.            Medication notes: no changes made    This medication list is complete to the best of my knowledge as of 2/1/2019    Please call if questions.    Florencio Lofton PharmD candidate  2/1/2019 11:45 AM

## 2019-02-01 NOTE — PROGRESS NOTES
[facility]     Mar/chart reviewed  F/u AHRF, encephalopathy  Opens eyes a little, but doesn't want to interact much. Doesn't voice any specific complaints.    Vital Sign Min/Max for last 24 hours  Temp  Min: 96.9 °F (36.1 °C)  Max: 99.3 °F (37.4 °C)   BP  Min: 137/84  Max: 186/67   Pulse  Min: 60  Max: 101   Resp  Min: 18  Max: 22   SpO2  Min: 94 %  Max: 100 %   Flow (L/min)  Min: 3  Max: 40   Weight  Min: 95.6 kg (210 lb 12.2 oz)  Max: 99.9 kg (220 lb 4.8 oz)     Appears ill, sleepy  perrl,  no icterus,  mmm, no jvd, trachea midline, neck supple,  chest decreased, coarse, bilaterally, no crackles, + wheezes,   rrr,   soft, nt, nd +bs,  no c/c/ e    Labs:  7.37/70/79  Bun 22  Cr 1  Na 143  Bicarb 34  probnp 7194    CXR: NAD    A/P:  1. COPD with ae -- continue steroids and bronchodilators  2. Acute on chronic hypercapnic respiratory failure -- continue bipap, short breaks as tolerated, he is supposed to be using trilogy at home.  3. Acute on chronic hypoxemic respiratory failure -- avoid hyperoxia when off of PAP therapy, sats 88-92%  4. Chronic systolic chf -- continue lasix  5. Encephalopathy -- related to c02;     Suspect acute bronchitis as well, check rpp  Keep on bipap for now until more awake,   He has pretty severe c02 retention at baseline looks like    Discussed with rn    CC 31 mins.

## 2019-02-01 NOTE — PROGRESS NOTES
Discharge Planning Assessment  [facility]     Patient Name: Fredis Lockwood  MRN: [id number]  Today's Date: 2/1/2019    Admit Date: 1/31/2019    Discharge Needs Assessment     Row Name 02/01/19 1027       Living Environment    Lives With  alone    Current Living Arrangements  home/apartment/condo    Primary Care Provided by  self    Provides Primary Care For  no one    Family Caregiver if Needed  child(jaret), adult;sibling(s)    Quality of Family Relationships  helpful;involved;supportive    Able to Return to Prior Arrangements  yes    Living Arrangement Comments  pt lives alone        Resource/Environmental Concerns    Resource/Environmental Concerns  none       Transition Planning    Patient/Family Anticipates Transition to  home with family    Patient/Family Anticipated Services at Transition  home health care    Transportation Anticipated  family or friend will provide       Discharge Needs Assessment    Concerns to be Addressed  basic needs;discharge planning    Equipment Currently Used at Home  noninvasive ventilator;walker, rolling;oxygen    Equipment Needed After Discharge  none    Outpatient/Agency/Support Group Needs  homecare agency    Discharge Facility/Level of Care Needs  home with home health    Offered/Gave Vendor List  yes    Discharge Coordination/Progress  Pt is current with [facility]; CCP to follow progress and assist with d/c needs        Discharge Plan     Row Name 02/01/19 1029       Plan    Plan  Pt is current with [facility]; CCP to follow progress and assist with d/c needs    Patient/Family in Agreement with Plan  yes    Plan Comments  Checked IMM. Spoke with pts healthcare surrogate, his sister Justine Torres [phone] via phone, she confirmed facesheet correct. Explained role of CCP. She reports pt typically lives alone but can go to his daughters house if needed. Pt has a walker, home O2 and Trilogy machine from [employer]. Pt is current with [facility], Alie jones. Pt has no hx of SNF. Pt uses [facility] for prescriptions and to his sisters knowledge no problems getting or affording medications. At this time pt plans home with [facility] however SNF list left in room. CCP to follow progress and assist with d/c needs. JOSEPH Becker        Destination      No service coordination in this encounter.      Durable Medical Equipment      No service coordination in this encounter.      Dialysis/Infusion      No service coordination in this encounter.      Home Medical Care      Service Provider Request Status Selected Services Address Phone Number Fax Number    [facility] [address] [address] [phone]      [facility]      No service coordination in this encounter.          Demographic Summary     Row Name 02/01/19 1025       General Information    Admission Type  inpatient    Arrived From  home    Required Notices Provided  Important Message from Medicare    Reason for Consult  discharge planning    Preferred Language  English     Used During This Interaction  no        Functional Status     Row Name 02/01/19 1026       Functional Status    Usual Activity Tolerance  fair    Current Activity Tolerance  poor       Functional Status, IADL    Medications  independent;assistive person    Meal Preparation  assistive person;independent    Housekeeping  independent;assistive person    Laundry  independent;assistive person    Shopping  independent;assistive person        Psychosocial    No documentation.       Abuse/Neglect    No documentation.       Legal    No documentation.       Substance Abuse    No documentation.       Patient Forms    No documentation.           [place]

## 2019-02-01 NOTE — PROGRESS NOTES
" LOS: 1 day   Patient Care Team:  Jus Durham MD as PCP - General (Family Medicine)  Jus Durham MD as PCP - Claims Attributed  Fatimah Guerrero, RN as Care Coordinator (Population Health)    Chief Complaint: respiratory failure    Subjective  resting    Vital Signs  Temp:  [96.9 °F (36.1 °C)-99.3 °F (37.4 °C)] 98 °F (36.7 °C)  Heart Rate:  [] 70  Resp:  [18-22] 18  BP: (137-186)/() 171/93  FiO2 (%):  [30 %-40 %] 30 %  Body mass index is 30.24 kg/m².    Intake/Output Summary (Last 24 hours) at 2/1/2019 1141  Last data filed at 2/1/2019 0212  Gross per 24 hour   Intake --   Output 300 ml   Net -300 ml     No intake/output data recorded.            01/31/19  1608 01/31/19 2006 02/01/19  0549   Weight: 99.9 kg (220 lb 4.8 oz) 95.6 kg (210 lb 12.2 oz) 95.6 kg (210 lb 12.2 oz)         Objective:  General Appearance:  In no acute distress.    Vital signs: (most recent): Blood pressure (!) 180/103, pulse 57, temperature 97.9 °F (36.6 °C), temperature source Oral, resp. rate 18, height 177.8 cm (70\"), weight 95.6 kg (210 lb 12.2 oz), SpO2 100 %.    Output: Producing urine and producing stool.    HEENT: Normal HEENT exam.    Lungs:  There are decreased breath sounds.    Heart: Normal rate.  Regular rhythm.    Abdomen: Bowel sounds are normal.     Pulses: Distal pulses are intact.    Skin:  Warm and dry.  (Sternotomy healing well)            Results Review:        WBC WBC   Date Value Ref Range Status   01/31/2019 8.20 4.50 - 10.70 10*3/mm3 Final      HGB Hemoglobin   Date Value Ref Range Status   01/31/2019 11.4 (L) 13.7 - 17.6 g/dL Final      HCT Hematocrit   Date Value Ref Range Status   01/31/2019 38.4 (L) 40.4 - 52.2 % Final      Platelets Platelets   Date Value Ref Range Status   01/31/2019 253 140 - 500 10*3/mm3 Final        PT/INR:  No results found for: PROTIME/No results found for: INR    Sodium Sodium   Date Value Ref Range Status   02/01/2019 143 136 - 145 mmol/L Final   01/31/2019 143 136 - " 145 mmol/L Final      Potassium Potassium   Date Value Ref Range Status   02/01/2019 4.4 3.5 - 5.2 mmol/L Final   01/31/2019 4.9 3.5 - 5.2 mmol/L Final      Chloride Chloride   Date Value Ref Range Status   02/01/2019 99 98 - 107 mmol/L Final   01/31/2019 94 (L) 98 - 107 mmol/L Final      Bicarbonate CO2   Date Value Ref Range Status   02/01/2019 34.0 (H) 22.0 - 29.0 mmol/L Final   01/31/2019 40.0 (H) 22.0 - 29.0 mmol/L Final      BUN BUN   Date Value Ref Range Status   02/01/2019 22 8 - 23 mg/dL Final   01/31/2019 21 8 - 23 mg/dL Final      Creatinine Creatinine   Date Value Ref Range Status   02/01/2019 1.00 0.76 - 1.27 mg/dL Final   01/31/2019 1.37 (H) 0.76 - 1.27 mg/dL Final      Calcium Calcium   Date Value Ref Range Status   02/01/2019 9.1 8.6 - 10.5 mg/dL Final   01/31/2019 10.3 8.6 - 10.5 mg/dL Final      Magnesium No results found for: MG         carvedilol 12.5 mg Oral Q12H   enoxaparin 40 mg Subcutaneous Q24H   famotidine 20 mg Intravenous Q12H   furosemide 40 mg Oral Daily   ipratropium-albuterol 3 mL Nebulization Q4H - RT   methylPREDNISolone sodium succinate 40 mg Intravenous Q8H   tamsulosin 0.4 mg Oral Daily              Patient Active Problem List   Diagnosis Code   • Benign essential HTN I10   • Benign prostatic hyperplasia with urinary obstruction N40.1, N13.8   • ED (erectile dysfunction) of non-organic origin F52.21   • Elevated cholesterol E78.00   • Borderline diabetes R73.03   • Bundle branch block, right I45.10   • Avitaminosis D E55.9   • Tobacco abuse Z72.0   • Nocturia more than twice per night R35.1   • Health care maintenance Z00.00   • Medicare annual wellness visit, initial Z00.00   • Shortness of breath R06.02   • Onychomycosis B35.1   • Abnormal EKG R94.31   • Need for influenza vaccination Z23   • Pain of right hip joint M25.551   • Panlobular emphysema (CMS/HCC) J43.1   • Dilated cardiomyopathy (CMS/HCC) I42.0   • Coronary artery disease involving native coronary artery of native  heart with angina pectoris (CMS/[facility]) I25.119   • S/P CABG (coronary artery bypass graft) Z95.1   • Ischemic cardiomyopathy I25.5   • Abdominal aortic aneurysm (AAA) without rupture (CMS/[facility]) I71.4   • COPD (chronic obstructive pulmonary disease) (CMS/[facility]) J44.9   • Nocturnal hypoxemia G47.34   • Hypoxia R09.02   • Anemia D64.9   • Altered mental status R41.82   • Acute hypercapnic respiratory failure (CMS/[facility]) J96.02       Assessment & Plan    -Acute hypercapnic respiratory failure/COPD exaccerbation- recurrent admitted in early January acidotic and alerted MS   -CAD s/p CABG X7 with LIMA- 12/2018 [facility]  -ICM, EF 30%----on beta blocker and ARB  -hx RBBB  -Severe COPD, paralyzed right hemidiaphragm   -HTN   -HL   -Abdominal aortic aneurysm, 4.5cm by US in 2/18  -BPH   -post op anemia, expected ABL     Hypertensive, renal function okay, will restart losartan.  Restart baby ASA and lipitor.  Incision looks great. Healing well.        Krista Berumen, YULI  02/01/19  11:41 AM

## 2019-02-01 NOTE — PLAN OF CARE
Problem: Patient Care Overview  Goal: Plan of Care Review  Outcome: Ongoing (interventions implemented as appropriate)   02/01/19 0621   Coping/Psychosocial   Plan of Care Reviewed With patient   Plan of Care Review   Progress improving   OTHER   Outcome Summary Pt admitted from ER at shift change, was lethargic but responsive, wore BIPAP throughout the night and CO2 improving but responsiveness does wax and wane, woke up and was oriented briefly earlier in the shift but is sleeping soundly and requires more stimulation to wake up and follow commands this am. Tolerating BIPAP well thus far. Vitals stable. Will continue to monitor. CHARLIE Sarabia RN

## 2019-02-02 NOTE — PLAN OF CARE
Problem: Patient Care Overview  Goal: Plan of Care Review  Outcome: Ongoing (interventions implemented as appropriate)   02/02/19 0452   Coping/Psychosocial   Plan of Care Reviewed With patient   Plan of Care Review   Progress improving   OTHER   Outcome Summary meds per order, pt [place] bipap and small breaks on 5l hifl, skin care per protoocl, no falls, see vs and labs     Goal: Individualization and Mutuality  Outcome: Ongoing (interventions implemented as appropriate)    Goal: Discharge Needs Assessment  Outcome: Ongoing (interventions implemented as appropriate)    Goal: Interprofessional Rounds/Family Conf  Outcome: Ongoing (interventions implemented as appropriate)      Problem: Fall Risk (Adult)  Goal: Identify Related Risk Factors and Signs and Symptoms  Outcome: Outcome(s) achieved Date Met: 02/02/19    Goal: Absence of Fall  Outcome: Ongoing (interventions implemented as appropriate)      Problem: Skin Injury Risk (Adult)  Goal: Identify Related Risk Factors and Signs and Symptoms  Outcome: Outcome(s) achieved Date Met: 02/02/19    Goal: Skin Health and Integrity  Outcome: Ongoing (interventions implemented as appropriate)      Problem: NPPV/CPAP (Adult)  Goal: Signs and Symptoms of Listed Potential Problems Will be Absent, Minimized or Managed (NPPV/CPAP)  Outcome: Outcome(s) achieved Date Met: 02/02/19

## 2019-02-02 NOTE — PROGRESS NOTES
LOS: 2 days   Patient Care Team:  Jus Durham MD as PCP - General (Family Medicine)  Jus Durham MD as PCP - Claims Attributed  Fatimah Guerrero RN as Care Coordinator (Population Health)    Subjective     Patient notes he is doing markedly better he's an off noninvasive ventilation for several hours now his only complaint is he is very hungry he is not short of breath he says he normally uses about 2 L O2 he is on 3 L currently with oxygen saturations of 97 and 98%.  He has no pain is not really coughing much.    Review of Systems:         Objective     Vital Signs  Vital Sign Min/Max for last 24 hours  Temp  Min: 97.9 °F (36.6 °C)  Max: 99.1 °F (37.3 °C)   BP  Min: 126/83  Max: 180/103   Pulse  Min: 57  Max: 86   Resp  Min: 16  Max: 19   SpO2  Min: 95 %  Max: 100 %   Flow (L/min)  Min: 5  Max: 5   Weight  Min: 94.4 kg (208 lb 1.8 oz)  Max: 94.4 kg (208 lb 1.8 oz)        Ventilator/Non-Invasive Ventilation Settings (From admission, onward)    Start     Ordered    01/31/19 1701  BIPAP  Until Discontinued     Question Answer Comment   Type: BIPAP    NIPPV Mask Interface Per Patient Preference        01/31/19 1700                       Body mass index is 29.86 kg/m².  I/O last 3 completed shifts:  In: 252 [P.O.:252]  Out: 1950 [Urine:1950]  No intake/output data recorded.        Physical Exam:  General Appearance: Well-developed black male resting comfortably in bed does not appear in any acute distress  Eyes: Conjunctiva are clear and anicteric  ENT: Oral and nasal mucous membranes are moist no erythema no exudates Mallampati 1 airway nasal septum midline  Neck: No adenopathy or thyromegaly no jugular venous distention or hepatojugular reflux trachea midline  Lungs: Clear no wheezes no rales no rhonchi no accessory muscle use he's actually moving air very well  Cardiac: Regular rate and rhythm no murmur  Abdomen: Soft nontender no palpable organomegaly or masses  : Not examined  Musculoskeletal:  Grossly normal  Skin: No jaundice no petechiae noted  Neuro: He is alert oriented cooperative following commands moving all extremities well  Extremities/P Vascular: No clubbing no cyanosis no edema palpable radial dorsalis pedis pulses bilaterally  MSE: Very pleasant gentleman seems to be in pretty good spirits very cooperative.       Labs:  Results from last 7 days   Lab Units 02/02/19  0414 02/01/19  0553 01/31/19  1633   GLUCOSE mg/dL 134* 113* 101*   SODIUM mmol/L 140 143 143   POTASSIUM mmol/L 4.0 4.4 4.9   CO2 mmol/L 33.5* 34.0* 40.0*   CHLORIDE mmol/L 94* 99 94*   ANION GAP mmol/L 12.5 10.0 9.0   CREATININE mg/dL 1.00 1.00 1.37*   BUN mg/dL 24* 22 21   BUN / CREAT RATIO  24.0 22.0 15.3   CALCIUM mg/dL 9.4 9.1 10.3   EGFR IF NONAFRICN AM mL/min/1.73 74 74 51*   ALK PHOS U/L  --   --  87   TOTAL PROTEIN g/dL  --   --  7.8   ALT (SGPT) U/L  --   --  69*   AST (SGOT) U/L  --   --  44*   BILIRUBIN mg/dL  --   --  0.3   ALBUMIN g/dL  --   --  4.00   GLOBULIN gm/dL  --   --  3.8     Estimated Creatinine Clearance: 79.3 mL/min (by C-G formula based on SCr of 1 mg/dL).      Results from last 7 days   Lab Units 01/31/19  1633   WBC 10*3/mm3 8.20   RBC 10*6/mm3 3.75*   HEMOGLOBIN g/dL 11.4*   HEMATOCRIT % 38.4*   MCV fL 102.4*   MCH pg 30.4   MCHC g/dL 29.7*   RDW % 16.5*   RDW-SD fl 62.6*   MPV fL 9.9   PLATELETS 10*3/mm3 253   NEUTROPHIL % % 65.9   LYMPHOCYTE % % 24.1   MONOCYTES % % 9.8   EOSINOPHIL % % 0.1*   BASOPHIL % % 0.1   IMM GRAN % % 0.1   NEUTROS ABS 10*3/mm3 5.40   LYMPHS ABS 10*3/mm3 1.98   MONOS ABS 10*3/mm3 0.80   EOS ABS 10*3/mm3 0.01   BASOS ABS 10*3/mm3 0.01   IMMATURE GRANS (ABS) 10*3/mm3 0.01     Results from last 7 days   Lab Units 02/02/19  0330   PH, ARTERIAL pH units 7.503*   PO2 ART mm Hg 81.2   PCO2, ARTERIAL mm Hg 49.2*   HCO3 ART mmol/L 38.6*     Results from last 7 days   Lab Units 01/31/19  1633   TROPONIN T ng/mL 0.048*     Results from last 7 days   Lab Units 01/31/19  1633   PROBNP  pg/mL 7,194.0*                 Microbiology Results (last 10 days)     Procedure Component Value - Date/Time    Respiratory Panel, PCR - Swab, Nasopharynx [[id number]]  (Normal) Collected:  02/01/19 1126    Lab Status:  Final result Specimen:  Swab from Nasopharynx Updated:  02/01/19 1242     ADENOVIRUS, PCR Not Detected     Coronavirus 229E Not Detected     Coronavirus HKU1 Not Detected     Coronavirus NL63 Not Detected     Coronavirus OC43 Not Detected     Human Metapneumovirus Not Detected     Human Rhinovirus/Enterovirus Not Detected     Influenza B PCR Not Detected     Parainfluenza Virus 1 Not Detected     Parainfluenza Virus 2 Not Detected     Parainfluenza Virus 3 Not Detected     Parainfluenza Virus 4 Not Detected     Bordetella pertussis pcr Not Detected     Influenza A H1 2009 PCR Not Detected     Chlamydophila pneumoniae PCR Not Detected     Mycoplasma pneumo by PCR Not Detected     Influenza A PCR Not Detected     Influenza A H3 Not Detected     Influenza A H1 Not Detected     RSV, PCR Not Detected     Bordetella parapertussis PCR Not Detected                aspirin 81 mg Oral Daily   atorvastatin 40 mg Oral Nightly   carvedilol 12.5 mg Oral Q12H   enoxaparin 40 mg Subcutaneous Q24H   famotidine 20 mg Oral BID   furosemide 40 mg Oral Daily   ipratropium-albuterol 3 mL Nebulization Q4H - RT   losartan 50 mg Oral Daily   methylPREDNISolone sodium succinate 40 mg Intravenous Q8H   tamsulosin 0.4 mg Oral Daily          Diagnostics:  Ct Head Without Contrast    Result Date: 1/6/2019  CRANIAL CT SCAN WITHOUT CONTRAST  CLINICAL HISTORY: Confusion/delirium, altered LOC, unexplained  COMPARISON: None.  TECHNIQUE: Radiation dose reduction techniques were utilized, including automated exposure control and exposure modulation based on body size. Multiple axial images of the head were obtained without contrast.  FINDINGS:  There are no abnormal areas of increased density or mass effect. There are mild scattered areas  of decreased density in the white matter likely related to chronic ischemic gliotic changes.   Ventricles, sulci, and cisterns appear normal. Bone window images are unremarkable.         1. No acute intracranial abnormality.         This report was finalized on 1/6/2019 11:17 PM by Anuj Solo M.D.      Xr Chest 1 View    Result Date: 1/31/2019  XR CHEST 1 VW-  HISTORY: Male who is 68 years-old,  short of breath  TECHNIQUE: Frontal view of the chest  COMPARISON: 1/6/2019  FINDINGS: Heart size appears borderline. Aorta is tortuous. Pulmonary vasculature is unremarkable. Sternotomy wires are noted. Likely atelectasis or infiltrate at the bases, there may be small pleural effusions, appearance is similar to the prior exam. No pneumothorax. Right hemidiaphragm is elevated. No acute osseous process.      No significant change, continued follow-up suggested.  This report was finalized on 1/31/2019 4:43 PM by Dr. Nav Leigh M.D.      Xr Chest 1 View    Result Date: 1/6/2019  PORTABLE CHEST X-RAY  CLINICAL HISTORY: ams  COMPARISON: January 4, 2019.  FINDINGS: Portable AP view of the chest was obtained with overlying monitor leads in place. The lungs are very poorly aerated. Increasing basilar opacities, likely atelectasis and effusions. Superimposed pneumonia not excluded. Cardiac margins are obscured. Vascularity likely normal considering poor inspiratory effort. The patient is status post CABG.         Expiratory radiograph with some increase in basilar opacities as discussed   This report was finalized on 1/6/2019 10:02 PM by Anuj Solo M.D.      Xr Chest Pa & Lateral    Result Date: 1/5/2019  XR CHEST PA AND LATERAL-  HISTORY: Male who is 68 years-old,  hypoxia  TECHNIQUE: Frontal and lateral views of the chest  COMPARISON: 12/21/2018  FINDINGS: Heart is enlarged. Sternotomy wires noted. Pulmonary vasculature is less congested. Small left basilar atelectasis/infiltrate/effusion, minimal right basilar  likely atelectasis. No pneumothorax. Right hemidiaphragm is elevated. No acute osseous process.      Small left basilar atelectasis/infiltrate/effusion, minimal right basilar likely atelectasis, follow-up suggested.  This report was finalized on 1/5/2019 5:48 AM by Dr. Nav Leigh M.D.      Results for orders placed in visit on 12/17/18   Emergent/Open-Heart Anesthesia ALEISHA    Narrative Intra-Operative ALEISHA was performed by anesthesia.  Please see Intra-Op and   Anesthesia notes for documentation.           Active Hospital Problems    Diagnosis Date Noted   • Acute hypercapnic respiratory failure (CMS/[facility]) [J96.02] 01/31/2019      Resolved Hospital Problems   No resolved problems to display.         Assessment/Plan     1. Acute exacerbation COPD much improved  2. Acute on chronic hypercapnic and hypoxemic respiratory failure much improved  3. Acute on Chronic systolic CHF  4. Metabolic encephalopathy secondary to CO2 retention resolved  5. Acute bronchitis  6. Coronary artery disease  7. Dilated cardiomyopathy   8. Hypertension  9. Hyperlipidemia  10. Anemia mild hemoglobin is much improved from recent levels.    Plan for disposition: Transfer to floor    Hesham Mckenzie MD  02/02/19  9:34 AM    Time: Spent almost 40 minutes on patient's care today and part doing transfer orders

## 2019-02-02 NOTE — PROGRESS NOTES
-Acute hypercapnic respiratory failure/COPD exaccerbation- recurrent admitted in early January acidotic and alerted MS   -CAD s/p CABG X7 with LIMA- 12/2018 [facility]  -ICM, EF 30%----on beta blocker and ARB  -hx RBBB  -Severe COPD, paralyzed right hemidiaphragm   -HTN   -HL   -Abdominal aortic aneurysm, 4.5cm by US in 2/18  -BPH   -post op anemia, expected ABL     States breathing better. Wants nicotine patch increased.        Krista Berumen, APRN  02/02/19  10:03 AM

## 2019-02-03 NOTE — PLAN OF CARE
Problem: NPPV/CPAP (Adult)  Intervention: Optimize Tolerance of Noninvasive Ventilation  Pt no longer on O2, he is very compliant with his bipap and wears every night.

## 2019-02-03 NOTE — PLAN OF CARE
Problem: Patient Care Overview  Goal: Plan of Care Review  Outcome: Ongoing (interventions implemented as appropriate)   02/03/19 0535   Coping/Psychosocial   Plan of Care Reviewed With patient   Plan of Care Review   Progress improving   OTHER   Outcome Summary Pt a&ox4. Wore bipap while sleeping. Had ABG's drawn this morning. Awaiting results. Currently resting comfortably in bed. VS stable. Will continue to monitor.      Goal: Individualization and Mutuality  Outcome: Ongoing (interventions implemented as appropriate)    Goal: Discharge Needs Assessment  Outcome: Ongoing (interventions implemented as appropriate)    Goal: Interprofessional Rounds/Family Conf  Outcome: Ongoing (interventions implemented as appropriate)      Problem: Fall Risk (Adult)  Goal: Absence of Fall  Outcome: Ongoing (interventions implemented as appropriate)      Problem: Skin Injury Risk (Adult)  Goal: Skin Health and Integrity  Outcome: Ongoing (interventions implemented as appropriate)

## 2019-02-03 NOTE — PLAN OF CARE
Problem: Patient Care Overview  Goal: Plan of Care Review  Outcome: Ongoing (interventions implemented as appropriate)   02/03/19 2424   Coping/Psychosocial   Plan of Care Reviewed With patient   Plan of Care Review   Progress improving   OTHER   Outcome Summary VSS, No issues today. Rested comfortable

## 2019-02-03 NOTE — PROGRESS NOTES
LOS: 3 days   Patient Care Team:  Jus Durham MD as PCP - General (Family Medicine)  Jus Durham MD as PCP - Claims Attributed  Fatimah Guerrero RN as Care Coordinator (Population Health)    Subjective     No complaints today says he is breathing well on supplemental oxygen.  Has not had to use noninvasive ventilation.    Review of Systems:         Objective     Vital Signs  Vital Sign Min/Max for last 24 hours  Temp  Min: 98.1 °F (36.7 °C)  Max: 98.6 °F (37 °C)   BP  Min: 123/81  Max: 150/91   Pulse  Min: 55  Max: 81   Resp  Min: 15  Max: 18   SpO2  Min: 89 %  Max: 100 %   Flow (L/min)  Min: 1  Max: 2   No Data Recorded        Ventilator/Non-Invasive Ventilation Settings (From admission, onward)    Start     Ordered    01/31/19 1701  BIPAP  Until Discontinued     Question Answer Comment   Type: BIPAP    NIPPV Mask Interface Per Patient Preference        01/31/19 1700                       Body mass index is 29.86 kg/m².  I/O last 3 completed shifts:  In: 1532 [P.O.:1532]  Out: 1976 [Urine:1976]  I/O this shift:  In: 360 [P.O.:360]  Out: 200 [Urine:200]        Physical Exam:  General Appearance: Well-developed black male resting comfortably in bed does not appear in any acute distress  Eyes: Conjunctiva are clear and anicteric  ENT: Oral and nasal mucous membranes are moist no erythema no exudates Mallampati 1 airway nasal septum midline  Neck: No adenopathy or thyromegaly no jugular venous distention or hepatojugular reflux trachea midline  Lungs: Clear no wheezes no rales no rhonchi no accessory muscle use he's actually moving air very well  Cardiac: Regular rate and rhythm no murmur  Abdomen: Soft nontender no palpable organomegaly or masses  : Not examined  Musculoskeletal: Grossly normal  Skin: No jaundice no petechiae noted  Neuro: He is alert oriented cooperative following commands moving all extremities well  Extremities/P Vascular: No clubbing no cyanosis no edema palpable radial  dorsalis pedis pulses bilaterally  MSE: Very pleasant gentleman seems to be in pretty good spirits .       Labs:  Results from last 7 days   Lab Units 02/02/19  0414 02/01/19  0553 01/31/19  1633   GLUCOSE mg/dL 134* 113* 101*   SODIUM mmol/L 140 143 143   POTASSIUM mmol/L 4.0 4.4 4.9   CO2 mmol/L 33.5* 34.0* 40.0*   CHLORIDE mmol/L 94* 99 94*   ANION GAP mmol/L 12.5 10.0 9.0   CREATININE mg/dL 1.00 1.00 1.37*   BUN mg/dL 24* 22 21   BUN / CREAT RATIO  24.0 22.0 15.3   CALCIUM mg/dL 9.4 9.1 10.3   EGFR IF NONAFRICN AM mL/min/1.73 74 74 51*   ALK PHOS U/L  --   --  87   TOTAL PROTEIN g/dL  --   --  7.8   ALT (SGPT) U/L  --   --  69*   AST (SGOT) U/L  --   --  44*   BILIRUBIN mg/dL  --   --  0.3   ALBUMIN g/dL  --   --  4.00   GLOBULIN gm/dL  --   --  3.8     Estimated Creatinine Clearance: 79.3 mL/min (by C-G formula based on SCr of 1 mg/dL).      Results from last 7 days   Lab Units 01/31/19  1633   WBC 10*3/mm3 8.20   RBC 10*6/mm3 3.75*   HEMOGLOBIN g/dL 11.4*   HEMATOCRIT % 38.4*   MCV fL 102.4*   MCH pg 30.4   MCHC g/dL 29.7*   RDW % 16.5*   RDW-SD fl 62.6*   MPV fL 9.9   PLATELETS 10*3/mm3 253   NEUTROPHIL % % 65.9   LYMPHOCYTE % % 24.1   MONOCYTES % % 9.8   EOSINOPHIL % % 0.1*   BASOPHIL % % 0.1   IMM GRAN % % 0.1   NEUTROS ABS 10*3/mm3 5.40   LYMPHS ABS 10*3/mm3 1.98   MONOS ABS 10*3/mm3 0.80   EOS ABS 10*3/mm3 0.01   BASOS ABS 10*3/mm3 0.01   IMMATURE GRANS (ABS) 10*3/mm3 0.01     Results from last 7 days   Lab Units 02/03/19  0452   PH, ARTERIAL pH units 7.373   PO2 ART mm Hg 64.1*   PCO2, ARTERIAL mm Hg 69.9*   HCO3 ART mmol/L 40.7*     Results from last 7 days   Lab Units 01/31/19  1633   TROPONIN T ng/mL 0.048*     Results from last 7 days   Lab Units 01/31/19  1633   PROBNP pg/mL 7,194.0*                 Microbiology Results (last 10 days)     Procedure Component Value - Date/Time    Respiratory Panel, PCR - Swab, Nasopharynx [[id number]]  (Normal) Collected:  02/01/19 1126    Lab Status:  Final result  Specimen:  Swab from Nasopharynx Updated:  02/01/19 1242     ADENOVIRUS, PCR Not Detected     Coronavirus 229E Not Detected     Coronavirus HKU1 Not Detected     Coronavirus NL63 Not Detected     Coronavirus OC43 Not Detected     Human Metapneumovirus Not Detected     Human Rhinovirus/Enterovirus Not Detected     Influenza B PCR Not Detected     Parainfluenza Virus 1 Not Detected     Parainfluenza Virus 2 Not Detected     Parainfluenza Virus 3 Not Detected     Parainfluenza Virus 4 Not Detected     Bordetella pertussis pcr Not Detected     Influenza A H1 2009 PCR Not Detected     Chlamydophila pneumoniae PCR Not Detected     Mycoplasma pneumo by PCR Not Detected     Influenza A PCR Not Detected     Influenza A H3 Not Detected     Influenza A H1 Not Detected     RSV, PCR Not Detected     Bordetella parapertussis PCR Not Detected                aspirin 81 mg Oral Daily   atorvastatin 40 mg Oral Nightly   carvedilol 12.5 mg Oral Q12H   enoxaparin 40 mg Subcutaneous Q24H   famotidine 20 mg Oral BID   furosemide 40 mg Oral Daily   ipratropium-albuterol 3 mL Nebulization 4x Daily - RT   losartan 50 mg Oral Daily   methylPREDNISolone sodium succinate 40 mg Intravenous Q12H   tamsulosin 0.4 mg Oral Daily          Diagnostics:  Ct Head Without Contrast    Result Date: 1/6/2019  CRANIAL CT SCAN WITHOUT CONTRAST  CLINICAL HISTORY: Confusion/delirium, altered LOC, unexplained  COMPARISON: None.  TECHNIQUE: Radiation dose reduction techniques were utilized, including automated exposure control and exposure modulation based on body size. Multiple axial images of the head were obtained without contrast.  FINDINGS:  There are no abnormal areas of increased density or mass effect. There are mild scattered areas of decreased density in the white matter likely related to chronic ischemic gliotic changes.   Ventricles, sulci, and cisterns appear normal. Bone window images are unremarkable.         1. No acute intracranial abnormality.          This report was finalized on 1/6/2019 11:17 PM by Anuj Solo M.D.      Xr Chest 1 View    Result Date: 1/31/2019  XR CHEST 1 VW-  HISTORY: Male who is 68 years-old,  short of breath  TECHNIQUE: Frontal view of the chest  COMPARISON: 1/6/2019  FINDINGS: Heart size appears borderline. Aorta is tortuous. Pulmonary vasculature is unremarkable. Sternotomy wires are noted. Likely atelectasis or infiltrate at the bases, there may be small pleural effusions, appearance is similar to the prior exam. No pneumothorax. Right hemidiaphragm is elevated. No acute osseous process.      No significant change, continued follow-up suggested.  This report was finalized on 1/31/2019 4:43 PM by Dr. Nav Leigh M.D.      Xr Chest 1 View    Result Date: 1/6/2019  PORTABLE CHEST X-RAY  CLINICAL HISTORY: ams  COMPARISON: January 4, 2019.  FINDINGS: Portable AP view of the chest was obtained with overlying monitor leads in place. The lungs are very poorly aerated. Increasing basilar opacities, likely atelectasis and effusions. Superimposed pneumonia not excluded. Cardiac margins are obscured. Vascularity likely normal considering poor inspiratory effort. The patient is status post CABG.         Expiratory radiograph with some increase in basilar opacities as discussed   This report was finalized on 1/6/2019 10:02 PM by Anuj Solo M.D.      Xr Chest Pa & Lateral    Result Date: 1/5/2019  XR CHEST PA AND LATERAL-  HISTORY: Male who is 68 years-old,  hypoxia  TECHNIQUE: Frontal and lateral views of the chest  COMPARISON: 12/21/2018  FINDINGS: Heart is enlarged. Sternotomy wires noted. Pulmonary vasculature is less congested. Small left basilar atelectasis/infiltrate/effusion, minimal right basilar likely atelectasis. No pneumothorax. Right hemidiaphragm is elevated. No acute osseous process.      Small left basilar atelectasis/infiltrate/effusion, minimal right basilar likely atelectasis, follow-up suggested.  This report  was finalized on 1/5/2019 5:48 AM by Dr. Nav Leigh M.D.      Results for orders placed in visit on 12/17/18   Emergent/Open-Heart Anesthesia ALEISHA    Narrative Intra-Operative ALEISHA was performed by anesthesia.  Please see Intra-Op and   Anesthesia notes for documentation.           Active Hospital Problems    Diagnosis Date Noted   • Acute hypercapnic respiratory failure (CMS/HCC) [J96.02] 01/31/2019      Resolved Hospital Problems   No resolved problems to display.         Assessment/Plan     1. Acute exacerbation COPD much improved  2. Acute on chronic hypercapnic and hypoxemic respiratory failure much improved he is a chronic CO2 retainer and it looks like he will benefit from home volume assured noninvasive ventilation with his degree of CO2 retention this can be life prolonging  3. Acute on Chronic systolic CHF to be well compensated at this time  4. Metabolic encephalopathy secondary to CO2 retention resolved  5. Acute bronchitis  6. Coronary artery disease  7. Dilated cardiomyopathy   8. Hypertension  9. Hyperlipidemia  10. Anemia mild hemoglobin is much improved from recent levels.    Plan for disposition:    Hesham Mckenzie MD  02/03/19  6:22 PM    Time:

## 2019-02-04 NOTE — PLAN OF CARE
Problem: Patient Care Overview  Goal: Plan of Care Review  Outcome: Ongoing (interventions implemented as appropriate)   02/04/19 0325   Coping/Psychosocial   Plan of Care Reviewed With patient   Plan of Care Review   Progress improving   OTHER   Outcome Summary vss this pm; was able to tolerate bipap for approx 3 hours tonight; resting comfortably and without complaint; will continue to monitor     Goal: Individualization and Mutuality  Outcome: Ongoing (interventions implemented as appropriate)    Goal: Discharge Needs Assessment  Outcome: Ongoing (interventions implemented as appropriate)      Problem: Fall Risk (Adult)  Goal: Absence of Fall  Outcome: Ongoing (interventions implemented as appropriate)      Problem: Skin Injury Risk (Adult)  Goal: Skin Health and Integrity  Outcome: Ongoing (interventions implemented as appropriate)

## 2019-02-04 NOTE — PROGRESS NOTES
Continued Stay Note  [facility]     Patient Name: Fredis Lockwood  MRN: [id number]  Today's Date: 2/4/2019    Admit Date: 1/31/2019    Discharge Plan     Row Name 02/04/19 1424       Plan    Plan  Home with [facility] home heatlh and trilogy to be set up/ [employer]     Patient/Family in Agreement with Plan  yes    Plan Comments  Consult for CCP to help set up [employer].  Met with patient and he was unable to identify Respiratory Equipment he has at home.  As described it is CPAP or BIPAP or just oxygen.  He is unable to recall DME company, states they were out of [facility].  He asked for [employer] to provided DME as he was familar with the company.  Spoke with Nina and she will follow..........................Vanesa Contreras RN        Discharge Codes    No documentation.             Vanesa Contreras RN

## 2019-02-04 NOTE — PROGRESS NOTES
LOS: 4 days   Patient Care Team:  Jus Durham MD as PCP - General (Family Medicine)  Jus Durham MD as PCP - Claims Attributed  Fatimah Guerrero RN as Care Coordinator (Population Health)    Subjective     Patient has no complaints today he is feeling much better.  He is actually sitting on the side of the bed he's on room air his oxygen saturations are 91% and patient says he has some kind of Pap device at home that he's not been real good about using he doesn't know whether to CPAP BiPAP or noninvasive ventilator.  He was given it a few months ago when he was in the hospital on think in [place] he reports he did use the noninvasive volume assured ventilator and did well last night, tolerated well and felt good this morning..    Review of Systems:         Objective     Vital Signs  Vital Sign Min/Max for last 24 hours  Temp  Min: 97.8 °F (36.6 °C)  Max: 98 °F (36.7 °C)   BP  Min: 155/95  Max: 178/99   Pulse  Min: 54  Max: 74   Resp  Min: 18  Max: 20   SpO2  Min: 92 %  Max: 100 %   Flow (L/min)  Min: 1  Max: 1   Weight  Min: 93.9 kg (207 lb 0.2 oz)  Max: 93.9 kg (207 lb 0.2 oz)        Ventilator/Non-Invasive Ventilation Settings (From admission, onward)    Start     Ordered    01/31/19 1701  BIPAP  Until Discontinued     Question Answer Comment   Type: BIPAP    NIPPV Mask Interface Per Patient Preference        01/31/19 1700                       Body mass index is 29.7 kg/m².  I/O last 3 completed shifts:  In: 360 [P.O.:360]  Out: 400 [Urine:400]  No intake/output data recorded.        Physical Exam:  General Appearance: Well-developed black male resting comfortably in bed does not appear in any acute distress  Eyes: Conjunctiva are clear and anicteric  ENT: Oral and nasal mucous membranes are moist no erythema no exudates Mallampati 1 airway nasal septum midline  Neck: No adenopathy or thyromegaly no jugular venous distention or hepatojugular reflux trachea midline  Lungs: Clear no wheezes  no rales no rhonchi no accessory muscle use he's actually moving air very well  Cardiac: Regular rate and rhythm no murmur  Abdomen: Soft nontender no palpable organomegaly or masses  : Not examined  Musculoskeletal: Grossly normal  Skin: No jaundice no petechiae noted  Neuro: He is alert oriented cooperative following commands moving all extremities well  Extremities/P Vascular: No clubbing no cyanosis no edema palpable radial dorsalis pedis pulses bilaterally  MSE: Very pleasant gentleman seems to be in pretty good spirits .       Labs:  Results from last 7 days   Lab Units 02/02/19  0414 02/01/19  0553 01/31/19  1633   GLUCOSE mg/dL 134* 113* 101*   SODIUM mmol/L 140 143 143   POTASSIUM mmol/L 4.0 4.4 4.9   CO2 mmol/L 33.5* 34.0* 40.0*   CHLORIDE mmol/L 94* 99 94*   ANION GAP mmol/L 12.5 10.0 9.0   CREATININE mg/dL 1.00 1.00 1.37*   BUN mg/dL 24* 22 21   BUN / CREAT RATIO  24.0 22.0 15.3   CALCIUM mg/dL 9.4 9.1 10.3   EGFR IF NONAFRICN AM mL/min/1.73 74 74 51*   ALK PHOS U/L  --   --  87   TOTAL PROTEIN g/dL  --   --  7.8   ALT (SGPT) U/L  --   --  69*   AST (SGOT) U/L  --   --  44*   BILIRUBIN mg/dL  --   --  0.3   ALBUMIN g/dL  --   --  4.00   GLOBULIN gm/dL  --   --  3.8     Estimated Creatinine Clearance: 79.1 mL/min (by C-G formula based on SCr of 1 mg/dL).      Results from last 7 days   Lab Units 01/31/19  1633   WBC 10*3/mm3 8.20   RBC 10*6/mm3 3.75*   HEMOGLOBIN g/dL 11.4*   HEMATOCRIT % 38.4*   MCV fL 102.4*   MCH pg 30.4   MCHC g/dL 29.7*   RDW % 16.5*   RDW-SD fl 62.6*   MPV fL 9.9   PLATELETS 10*3/mm3 253   NEUTROPHIL % % 65.9   LYMPHOCYTE % % 24.1   MONOCYTES % % 9.8   EOSINOPHIL % % 0.1*   BASOPHIL % % 0.1   IMM GRAN % % 0.1   NEUTROS ABS 10*3/mm3 5.40   LYMPHS ABS 10*3/mm3 1.98   MONOS ABS 10*3/mm3 0.80   EOS ABS 10*3/mm3 0.01   BASOS ABS 10*3/mm3 0.01   IMMATURE GRANS (ABS) 10*3/mm3 0.01     Results from last 7 days   Lab Units 02/03/19  0452   PH, ARTERIAL pH units 7.373   PO2 ART mm Hg 64.1*    PCO2, ARTERIAL mm Hg 69.9*   HCO3 ART mmol/L 40.7*     Results from last 7 days   Lab Units 01/31/19  1633   TROPONIN T ng/mL 0.048*     Results from last 7 days   Lab Units 01/31/19  1633   PROBNP pg/mL 7,194.0*                 Microbiology Results (last 10 days)     Procedure Component Value - Date/Time    Respiratory Panel, PCR - Swab, Nasopharynx [[id number]]  (Normal) Collected:  02/01/19 1126    Lab Status:  Final result Specimen:  Swab from Nasopharynx Updated:  02/01/19 1242     ADENOVIRUS, PCR Not Detected     Coronavirus 229E Not Detected     Coronavirus HKU1 Not Detected     Coronavirus NL63 Not Detected     Coronavirus OC43 Not Detected     Human Metapneumovirus Not Detected     Human Rhinovirus/Enterovirus Not Detected     Influenza B PCR Not Detected     Parainfluenza Virus 1 Not Detected     Parainfluenza Virus 2 Not Detected     Parainfluenza Virus 3 Not Detected     Parainfluenza Virus 4 Not Detected     Bordetella pertussis pcr Not Detected     Influenza A H1 2009 PCR Not Detected     Chlamydophila pneumoniae PCR Not Detected     Mycoplasma pneumo by PCR Not Detected     Influenza A PCR Not Detected     Influenza A H3 Not Detected     Influenza A H1 Not Detected     RSV, PCR Not Detected     Bordetella parapertussis PCR Not Detected                aspirin 81 mg Oral Daily   atorvastatin 40 mg Oral Nightly   carvedilol 12.5 mg Oral Q12H   enoxaparin 40 mg Subcutaneous Q24H   famotidine 20 mg Oral BID   furosemide 40 mg Oral Daily   ipratropium-albuterol 3 mL Nebulization 4x Daily - RT   losartan 50 mg Oral Daily   nicotine 1 patch Transdermal Q24H   predniSONE 40 mg Oral Daily With Breakfast   tamsulosin 0.4 mg Oral Daily          Diagnostics:  Ct Head Without Contrast    Result Date: 1/6/2019  CRANIAL CT SCAN WITHOUT CONTRAST  CLINICAL HISTORY: Confusion/delirium, altered LOC, unexplained  COMPARISON: None.  TECHNIQUE: Radiation dose reduction techniques were utilized, including automated exposure  control and exposure modulation based on body size. Multiple axial images of the head were obtained without contrast.  FINDINGS:  There are no abnormal areas of increased density or mass effect. There are mild scattered areas of decreased density in the white matter likely related to chronic ischemic gliotic changes.   Ventricles, sulci, and cisterns appear normal. Bone window images are unremarkable.         1. No acute intracranial abnormality.         This report was finalized on 1/6/2019 11:17 PM by Anuj Solo M.D.      Xr Chest 1 View    Result Date: 1/31/2019  XR CHEST 1 VW-  HISTORY: Male who is 68 years-old,  short of breath  TECHNIQUE: Frontal view of the chest  COMPARISON: 1/6/2019  FINDINGS: Heart size appears borderline. Aorta is tortuous. Pulmonary vasculature is unremarkable. Sternotomy wires are noted. Likely atelectasis or infiltrate at the bases, there may be small pleural effusions, appearance is similar to the prior exam. No pneumothorax. Right hemidiaphragm is elevated. No acute osseous process.      No significant change, continued follow-up suggested.  This report was finalized on 1/31/2019 4:43 PM by Dr. Nav Leigh M.D.      Xr Chest 1 View    Result Date: 1/6/2019  PORTABLE CHEST X-RAY  CLINICAL HISTORY: ams  COMPARISON: January 4, 2019.  FINDINGS: Portable AP view of the chest was obtained with overlying monitor leads in place. The lungs are very poorly aerated. Increasing basilar opacities, likely atelectasis and effusions. Superimposed pneumonia not excluded. Cardiac margins are obscured. Vascularity likely normal considering poor inspiratory effort. The patient is status post CABG.         Expiratory radiograph with some increase in basilar opacities as discussed   This report was finalized on 1/6/2019 10:02 PM by Anuj Solo M.D.      Xr Chest Pa & Lateral    Result Date: 1/5/2019  XR CHEST PA AND LATERAL-  HISTORY: Male who is 68 years-old,  hypoxia  TECHNIQUE: Frontal  and lateral views of the chest  COMPARISON: 12/21/2018  FINDINGS: Heart is enlarged. Sternotomy wires noted. Pulmonary vasculature is less congested. Small left basilar atelectasis/infiltrate/effusion, minimal right basilar likely atelectasis. No pneumothorax. Right hemidiaphragm is elevated. No acute osseous process.      Small left basilar atelectasis/infiltrate/effusion, minimal right basilar likely atelectasis, follow-up suggested.  This report was finalized on 1/5/2019 5:48 AM by Dr. Nav Leigh M.D.      Results for orders placed in visit on 12/17/18   Emergent/Open-Heart Anesthesia ALEISHA    Narrative Intra-Operative ALEISHA was performed by anesthesia.  Please see Intra-Op and   Anesthesia notes for documentation.           Active Hospital Problems    Diagnosis Date Noted   • Acute hypercapnic respiratory failure (CMS/HCC) [J96.02] 01/31/2019      Resolved Hospital Problems   No resolved problems to display.         Assessment/Plan     1. Acute exacerbation COPD much improved  2. Acute on chronic hypercapnic and hypoxemic respiratory failure much improved he is a chronic CO2 retainer and it looks like he will benefit from home volume assured noninvasive ventilation with his degree of CO2 retention this can be life prolonging.  I am going to recheck his gases in the morning since he is doing better.  Question is what kind of equipment he has at home also.  3. Acute on Chronic systolic CHF to be well compensated at this time  4. Metabolic encephalopathy secondary to CO2 retention resolved  5. Acute bronchitis  6. Coronary artery disease  7. Dilated cardiomyopathy   8. Hypertension pressure consistently a little high I'm going to increase his Coreg slightly  9. Hyperlipidemia  10. Anemia mild hemoglobin is much improved from recent levels.    Plan for disposition: May be home tomorrow evening    Hesham Mckenzie MD  02/04/19  6:13 PM    Time:

## 2019-02-04 NOTE — PLAN OF CARE
Problem: Patient Care Overview  Goal: Plan of Care Review  Outcome: Ongoing (interventions implemented as appropriate)   02/04/19 7554   Coping/Psychosocial   Plan of Care Reviewed With patient   Plan of Care Review   Progress improving   OTHER   Outcome Summary pt vss, no complaints of pain. pt weaned to chris air while awake, 1 liter when sleeping. pt wearing bipap some at night. restign well. will continue to monitor.      Goal: Individualization and Mutuality  Outcome: Ongoing (interventions implemented as appropriate)    Goal: Discharge Needs Assessment  Outcome: Ongoing (interventions implemented as appropriate)      Problem: Fall Risk (Adult)  Goal: Absence of Fall  Outcome: Ongoing (interventions implemented as appropriate)      Problem: Skin Injury Risk (Adult)  Goal: Skin Health and Integrity  Outcome: Ongoing (interventions implemented as appropriate)

## 2019-02-04 NOTE — PROGRESS NOTES
Continued Stay Note  [facility]     Patient Name: Fredis Lockwood  MRN: [id number]  Today's Date: 2/4/2019    Admit Date: 1/31/2019    Discharge Plan     Row Name 02/04/19 1646       Plan    Plan  Home with [facility] has a Trilogy at home per [facility]    Plan Comments  Spoke with Nina with [employer].  She states that patient has a Trilogy provided by them since January.  .................................Vanesa Contreras RN    Row Name 02/04/19 1424       Plan    Plan  Home with [facility] home heatl[facility] and trilogy to be set up/ [employer]     Patient/Family in Agreement with Plan  yes    Plan Comments  Consult for CCP to help set up Trilogy.  Met with patient and he was unable to identify Respiratory Equipment he has at home.  As described it is CPAP or BIPAP or just oxygen.  He is unable to recall DME company, states they were out of [facility].  He asked for [employer] to provided DME as he was familar with the company.  Spoke with Nina and she will follow..........................Vanesa Contreras RN        Discharge Codes    No documentation.             Vanesa Contreras RN

## 2019-02-05 NOTE — PLAN OF CARE
Problem: Patient Care Overview  Goal: Plan of Care Review  Outcome: Ongoing (interventions implemented as appropriate)   02/05/19 9861   Coping/Psychosocial   Plan of Care Reviewed With patient   Plan of Care Review   Progress improving   OTHER   Outcome Summary vss. no c/o pain. bipap worn while sleeping - pt states he's been using bipap x4 hours. possible discharge today. resting well throughout shift. will continue to monitor.

## 2019-02-05 NOTE — DISCHARGE SUMMARY
Date of Discharge:  2/5/2019    Discharge Diagnoses:  1.  Acute exacerbation COPD  2. Acute on chronic hypoxemic and hypercapnic respiratory failure  3. Acute on chronic systolic CHF  4. Hypertension uncontrolled  5. Metabolic encephalopathy secondary to CO2 retention  6. Acute bronchitis  7. Coronary artery disease  8. Dilated cardiomyopathy  9. Hyperlipidemia  10. Anemia      Hospital Course  Patient is a 68 y.o. male presented with presented with respiratory failure he has a history of COPD he has a home noninvasive ventilator and home O2 he also has a history of coronary disease cardiomyopathy and chronic CHF he was in acute CHF and I think the CHF was a major contributor and his COPD exacerbation had trouble with his blood pressure and it may have been as significant hypertension that helped contribute to him developing failure and had to increase his carvedilol and add amlodipine to get moderate control of her blood pressure still not ideal but he has improved dramatically is actually saturating in the low 90s on room air now although he is still CO2 retaining.  Patient has been using a noninvasive ventilator here he has one at home but he will continue with O2 at night he is going to monitor his O2 and continue supplemental O2 as needed watch his weight and blood pressure I've asked him to follow-up with his primary care physician Dr. Durham 1 week I would recommend checking his electrolytes and blood pressure assessing his fluid balance.  He reports he has an appointment in our office for February 14 and keep that appointment.  He will resume his bronchodilator regimen of Symbicort and Spiriva with when necessary albuterol.  When he came in he was confused and I think it was primarily a CO2 narcosis is improved quickly he is normal now.      Procedures Performed         Consults:   Consults     Date and Time Order Name Status Description    1/31/2019 1748 Pulmonology (on-call MD unless specified)  Completed     1/7/2019 0302 Inpatient Pulmonology Consult Completed     1/6/2019 8612 LCG (on-call MD unless specified) Completed           Pertinent Test Results:   Labs:  Results from last 7 days   Lab Units 02/02/19  0414 02/01/19  0553 01/31/19  1633   GLUCOSE mg/dL 134* 113* 101*   SODIUM mmol/L 140 143 143   POTASSIUM mmol/L 4.0 4.4 4.9   CO2 mmol/L 33.5* 34.0* 40.0*   CHLORIDE mmol/L 94* 99 94*   ANION GAP mmol/L 12.5 10.0 9.0   CREATININE mg/dL 1.00 1.00 1.37*   BUN mg/dL 24* 22 21   BUN / CREAT RATIO  24.0 22.0 15.3   CALCIUM mg/dL 9.4 9.1 10.3   EGFR IF NONAFRICN AM mL/min/1.73 74 74 51*   ALK PHOS U/L  --   --  87   TOTAL PROTEIN g/dL  --   --  7.8   ALT (SGPT) U/L  --   --  69*   AST (SGOT) U/L  --   --  44*   BILIRUBIN mg/dL  --   --  0.3   ALBUMIN g/dL  --   --  4.00   GLOBULIN gm/dL  --   --  3.8     Estimated Creatinine Clearance: 79 mL/min (by C-G formula based on SCr of 1 mg/dL).      Results from last 7 days   Lab Units 01/31/19  1633   WBC 10*3/mm3 8.20   RBC 10*6/mm3 3.75*   HEMOGLOBIN g/dL 11.4*   HEMATOCRIT % 38.4*   MCV fL 102.4*   MCH pg 30.4   MCHC g/dL 29.7*   RDW % 16.5*   RDW-SD fl 62.6*   MPV fL 9.9   PLATELETS 10*3/mm3 253   NEUTROPHIL % % 65.9   LYMPHOCYTE % % 24.1   MONOCYTES % % 9.8   EOSINOPHIL % % 0.1*   BASOPHIL % % 0.1   IMM GRAN % % 0.1   NEUTROS ABS 10*3/mm3 5.40   LYMPHS ABS 10*3/mm3 1.98   MONOS ABS 10*3/mm3 0.80   EOS ABS 10*3/mm3 0.01   BASOS ABS 10*3/mm3 0.01   IMMATURE GRANS (ABS) 10*3/mm3 0.01     Results from last 7 days   Lab Units 02/05/19  0848   PH, ARTERIAL pH units 7.389   PO2 ART mm Hg 61.9*   PCO2, ARTERIAL mm Hg 62.2*   HCO3 ART mmol/L 37.5*     Results from last 7 days   Lab Units 01/31/19  1633   TROPONIN T ng/mL 0.048*     Results from last 7 days   Lab Units 01/31/19  1633   PROBNP pg/mL 7,194.0*                   Imaging Results (last 72 hours)     ** No results found for the last 72 hours. **        Results for orders placed in visit on 12/17/18    Emergent/Open-Heart Anesthesia ALEISHA    Narrative Intra-Operative ALEISHA was performed by anesthesia.  Please see Intra-Op and   Anesthesia notes for documentation.           Condition on Discharge:  Dramatically improved    Vital Signs  Temp:  [97.7 °F (36.5 °C)-98 °F (36.7 °C)] 98 °F (36.7 °C)  Heart Rate:  [62-85] 77  Resp:  [18-20] 20  BP: (144-177)/() 144/88  FiO2 (%):  [30 %] 30 %    Physical Exam:  General Appearance: Well developed black male he is dressed waiting to go home and doesn't appear in any acute distress  Eyes: Conjunctiva are clear and anicteric  ENT: Mucous membranes are moist no erythema or exudates  Neck: No jugular venous distention no hepatojugular reflux trachea midline  Lungs: Clear no wheezes rales or rhonchi nonlabored symmetric expansion  Cardiac: Regular rate and rhythm no murmur  Abdomen: Soft nontender no palpable organomegaly  : Not examined  Musc/Skel: Grossly normal  Skin: Dry no jaundice  Neuro: He is alert oriented cooperative he is up ambulating with no difficulties  Extremities/P Vascular: No clubbing no cyanosis no edema palpable radial pulses bilaterally  MSE: Seems to be in very good spirits today      Discharge Disposition  Home or Self Care    Discharge Medications     Discharge Medications      New Medications      Instructions Start Date   amLODIPine 10 MG tablet  Commonly known as:  NORVASC   10 mg, Oral, Every 24 Hours Scheduled      famotidine 20 MG tablet  Commonly known as:  PEPCID   20 mg, Oral, 2 Times Daily         Changes to Medications      Instructions Start Date   albuterol sulfate  (90 Base) MCG/ACT inhaler  Commonly known as:  VENTOLIN HFA  What changed:  Another medication with the same name was added. Make sure you understand how and when to take each.   2 puffs, Inhalation, Every 4 Hours PRN      albuterol (2.5 MG/3ML) 0.083% nebulizer solution  Commonly known as:  PROVENTIL  What changed:  You were already taking a medication with the same  name, and this prescription was added. Make sure you understand how and when to take each.   2.5 mg, Nebulization, 4 Times Daily PRN      carvedilol 25 MG tablet  Commonly known as:  COREG  What changed:    · medication strength  · how much to take   25 mg, Oral, Every 12 Hours         Continue These Medications      Instructions Start Date   aspirin 81 MG EC tablet   81 mg, Oral, Daily      atorvastatin 40 MG tablet  Commonly known as:  LIPITOR   40 mg, Oral, Nightly      budesonide-formoterol 160-4.5 MCG/ACT inhaler  Commonly known as:  SYMBICORT   2 puffs, Inhalation, 2 Times Daily - RT      cholecalciferol 400 units tablet  Commonly known as:  VITAMIN D3   400 Units, Oral, Daily      furosemide 80 MG tablet  Commonly known as:  LASIX   40 mg, Oral, Daily      losartan 50 MG tablet  Commonly known as:  COZAAR   50 mg, Oral, Daily      MULTIVITAMINS PO   1 tablet, Oral, Daily      nicotine 14 MG/24HR patch  Commonly known as:  NICODERM CQ   1 patch, Transdermal, Every 24 Hours Scheduled      potassium chloride 20 MEQ CR tablet  Commonly known as:  K-DUR,KLOR-CON   20 mEq, Oral, Daily, On days you take your furosemide (water pill)      sennosides-docusate sodium 8.6-50 MG tablet  Commonly known as:  SENOKOT-S   2 tablets, Oral, Nightly      tamsulosin 0.4 MG capsule 24 hr capsule  Commonly known as:  FLOMAX   1 capsule, Oral, Daily      tiotropium 18 MCG per inhalation capsule  Commonly known as:  SPIRIVA HANDIHALER   1 capsule, Inhalation, Daily - RT      vitamin B-12 1000 MCG tablet  Commonly known as:  CYANOCOBALAMIN   1,000 mcg, Oral, Daily      zolpidem 10 MG tablet  Commonly known as:  AMBIEN   10 mg, Oral, Nightly PRN         Stop These Medications    predniSONE 10 MG tablet  Commonly known as:  DELTASONE            Discharge Diet:  cardiac diet    Activity at Discharge:  ad jenna. he should get into a regular exercise program    Follow-up Appointments  Future Appointments   Date Time Provider Department  [place]   2/14/2019  1:00 PM Fredis Collado MD [id number] None         Test Results Pending at Discharge       Hesham Mckenzie MD  02/05/19  4:37 PM    Time:

## 2019-02-06 NOTE — PROGRESS NOTES
Case Management Discharge Note    Final Note: home with [facility] home health    Destination      No service has been selected for the patient.      Durable Medical Equipment      No service has been selected for the patient.      Dialysis/Infusion      No service has been selected for the patient.      Home Medical Care - Selection Complete      Service Provider Request Status Selected Services Address Phone Number Fax Number    [facility] [address] [address] [phone]      Community Resources      No service has been selected for the patient.             Final Discharge Disposition Code: 03 - skilled nursing facility (SNF)

## 2019-02-06 NOTE — OUTREACH NOTE
Prep Survey      Responses   Facility patient discharged from?  [facility]   Is patient eligible?  Yes   Discharge diagnosis  COPD   Does the patient have one of the following disease processes/diagnoses(primary or secondary)?  COPD/Pneumonia   Does the patient have Home health ordered?  Yes   What is the Home health agency?   [facility]   Is there a DME ordered?  Yes   What DME was ordered?  [facility] per [place]   Prep survey completed?  Yes          Pallavi Garrett RN

## 2019-02-08 NOTE — OUTREACH NOTE
COPD/PN Week 1 Survey      Responses   Facility patient discharged from?  [facility]   Does the patient have one of the following disease processes/diagnoses(primary or secondary)?  COPD/Pneumonia   Is there a successful TCM telephone encounter documented?  No   Was the primary reason for admission:  COPD exacerbation   Week 1 attempt successful?  No   Unsuccessful attempts  Attempt 2          Nina Lawson RN

## 2019-02-12 NOTE — OUTREACH NOTE
COPD/PN Week 2 Survey      Responses   Facility patient discharged from?  [facility]   Does the patient have one of the following disease processes/diagnoses(primary or secondary)?  COPD/Pneumonia   Was the primary reason for admission:  COPD exacerbation   Week 2 attempt successful?  No   Unsuccessful attempts  Attempt 1          Jerald Villanueva RN

## 2019-02-14 NOTE — OUTREACH NOTE
COPD/PN Week 2 Survey      Responses   Facility patient discharged from?  [facility]   Does the patient have one of the following disease processes/diagnoses(primary or secondary)?  COPD/Pneumonia   Was the primary reason for admission:  COPD exacerbation   Week 2 attempt successful?  Yes   Call start time  1533   Call end time  1543   Discharge diagnosis  COPD   Meds reviewed with patient/caregiver?  Yes   Is the patient having any side effects they believe may be caused by any medication additions or changes?  No   Does the patient have all medications ordered at discharge?  Yes   Is the patient taking all medications as directed (includes completed medication regime)?  Yes   Does the patient have a primary care provider?   Yes   Does the patient have an appointment with their PCP or pulmonologist within 7 days of discharge?  Yes   Has the patient kept scheduled appointments due by today?  No   Comments  [facility] appt today. Encouraged to reschedule.   What is the Home health agency?   [facility]   Has home health visited the patient within 72 hours of discharge?  Yes   Home health comments  HH still coming. They are to come today.   What DME was ordered?  [facility] per Gloria   Has all DME been delivered?  Yes   Psychosocial issues?  No   Did the patient receive a copy of their discharge instructions?  Yes   Nursing interventions  Reviewed instructions with patient   What is the patient's perception of their health status since discharge?  Same   Nursing Interventions  Nurse provided patient education   Are the patient's immunizations up to date?   Yes   Nursing interventions  Educated on importance of maintaining up to date immunizations as advised by provider   Is the patient/caregiver able to teach back the hierarchy of who to call/visit for symptoms/problems? PCP, Specialist, Home health nurse, Urgent Care, ED, 911  Yes   Is the patient able to teach back COPD zones?  Yes   Nursing interventions  Education  provided on various zones   Patient reports what zone on this call?  Green Zone   Green Zone interventions:  Take daily medications, Do not smoke   Is the patient/caregiver able to teach back signs and symptoms of worsening condition:  Fever/chills, Shortness of breath, Chest pain [Advised to return to ER PRN PNE sx.]   Week 2 call completed?  Yes          Norman Rincon RN

## 2019-02-19 DIAGNOSIS — I25.119 CORONARY ARTERY DISEASE INVOLVING NATIVE CORONARY ARTERY OF NATIVE HEART WITH ANGINA PECTORIS (HCC): ICD-10-CM

## 2019-02-19 RX ORDER — ATORVASTATIN CALCIUM 40 MG/1
40 TABLET, FILM COATED ORAL NIGHTLY
Qty: 30 TABLET | Refills: 2 | Status: SHIPPED | OUTPATIENT
Start: 2019-02-19

## 2019-02-19 RX ORDER — CARVEDILOL 25 MG/1
25 TABLET ORAL EVERY 12 HOURS
Qty: 60 TABLET | Refills: 2 | Status: SHIPPED | OUTPATIENT
Start: 2019-02-19

## 2019-02-20 DIAGNOSIS — E78.00 ELEVATED CHOLESTEROL: ICD-10-CM

## 2019-02-20 RX ORDER — ATORVASTATIN CALCIUM 10 MG/1
10 TABLET, FILM COATED ORAL DAILY
Qty: 90 TABLET | Refills: 3 | OUTPATIENT
Start: 2019-02-20

## 2019-02-21 ENCOUNTER — READMISSION MANAGEMENT (OUTPATIENT)
Dept: CALL CENTER | Facility: HOSPITAL | Age: 69
End: 2019-02-21

## 2019-02-22 ENCOUNTER — READMISSION MANAGEMENT (OUTPATIENT)
Dept: CALL CENTER | Facility: HOSPITAL | Age: 69
End: 2019-02-22

## 2019-02-22 NOTE — OUTREACH NOTE
COPD/PN Week 3 Survey      Responses   Facility patient discharged from?  [facility]   Does the patient have one of the following disease processes/diagnoses(primary or secondary)?  COPD/Pneumonia   Was the primary reason for admission:  COPD exacerbation   Week 3 attempt successful?  Yes   Call start time  1621   Revoke  Patient  [last Saturday she said]   Discharge diagnosis  COPD   Is patient permission given to speak with other caregiver?  Yes          Zee Swenson RN

## 2019-02-25 ENCOUNTER — EPISODE CHANGES (OUTPATIENT)
Dept: CASE MANAGEMENT | Facility: OTHER | Age: 69
End: 2019-02-25

## 2019-02-26 ENCOUNTER — TELEPHONE (OUTPATIENT)
Dept: INTERNAL MEDICINE | Facility: CLINIC | Age: 69
End: 2019-02-26

## 2019-02-26 NOTE — TELEPHONE ENCOUNTER
----- Message from Mabel Chiang sent at 2/6/2019  2:28 PM EST -----  Contact: DAUGHTER  2/5 PT DISCH [facility] /ACUTE HYPERCAPNIC RESP FAILURE.  2/6 DAUGHTER CALLED TO SCHED HOSP FOLLOW UP.  2/11 APPT  AT 11:00 AM.

## 2022-03-21 NOTE — PLAN OF CARE
Problem: Patient Care Overview  Goal: Plan of Care Review  Outcome: Ongoing (interventions implemented as appropriate)   02/04/19 7427   Coping/Psychosocial   Plan of Care Reviewed With patient          Not applicable

## 2022-05-26 NOTE — OUTREACH NOTE
COPD/PN Week 1 Survey      Responses   Facility patient discharged from?  [facility]   Does the patient have one of the following disease processes/diagnoses(primary or secondary)?  COPD/Pneumonia   Is there a successful TCM telephone encounter documented?  No   Was the primary reason for admission:  COPD exacerbation   Week 1 attempt successful?  No   Unsuccessful attempts  Attempt 1          Jerald Villanueva RN  
Patient requests all Lab, Cardiology, and Radiology Results on their Discharge Instructions

## 2023-04-18 NOTE — OUTREACH NOTE
"COPD/PN Week 2 Survey      Responses   Facility patient discharged from?  [facility]   Does the patient have one of the following disease processes/diagnoses(primary or secondary)?  COPD/Pneumonia   Was the primary reason for admission:  COPD exacerbation   Week 2 attempt successful?  Yes   Call start time  1519   Call end time  1523   Discharge diagnosis  Tobacco abuse,  S/P CABG (coronary artery bypass graft),  Altered mental status,  COPD with exacerbation   Meds reviewed with patient/caregiver?  Yes   Is the patient taking all medications as directed (includes completed medication regime)?  Yes   Has the patient kept scheduled appointments due by today?  Yes   Home health comments  HH still coming   What is the patient's perception of their health status since discharge?  Improving   Additional teach back comments  Pt says he is using nicoderm patch and has not smoked. Says he is \"getting better\".  Keeping Dr rowland   Patient reports what zone on this call?  Green Zone   Green Zone  Appetite is good, Sleeping well, Reports doing well, Breathing without shortness of breath   Green Zone interventions:  Take daily medications, Avoid indoor/outdoor triggers, Do not smoke, Use oxygen as prescribed   Week 2 call completed?  Yes   Revoked  No further contact(revokes)-requires comment          Lyric Brizuela RN  " MALLAMPATI III denies loose teeth or dentures

## (undated) DEVICE — CANN ART DLP STR/TIP 18F7IN

## (undated) DEVICE — BALN PRESS WEDGE 5F 110CM

## (undated) DEVICE — HARMONIC SYNERGY DISSECTING HOOK WITH TORQUE WRENCH. FOR USE WITH BLUE HAND PIECE ONLY: Brand: HARMONIC SYNERGY

## (undated) DEVICE — BIOPATCH™ ANTIMICROBIAL DRESSING WITH CHLORHEXIDINE GLUCONATE IS A HYDROPHILLIC POLYURETHANE ABSORPTIVE FOAM WITH CHLORHEXIDINE GLUCONATE (CHG) WHICH INHIBITS BACTERIAL GROWTH UNDER THE DRESSING. THE DRESSING IS INTENDED TO BE USED TO ABSORB EXUDATE, COVER A WOUND CAUSED BY VASCULAR AND NONVASCULAR PERCUTANEOUS MEDICAL DEVICES DURING SURGERY, AS WELL AS REDUCE LOCAL INFECTION AND COLONIZATION OF MICROORGANISMS.: Brand: BIOPATCH

## (undated) DEVICE — 32 FR RIGHT ANGLE – SOFT PVC CATHETER: Brand: PVC THORACIC CATHETERS

## (undated) DEVICE — Device

## (undated) DEVICE — DRSNG SURESITE WNDW 2.38X2.75

## (undated) DEVICE — SPNG GZ WOVN 4X4IN 12PLY 10/BX STRL

## (undated) DEVICE — Device: Brand: LEVEL 1

## (undated) DEVICE — CATH DIAG IMPULSE PIG145 6F 110CM

## (undated) DEVICE — VASOVIEW HEMOPRO: Brand: VASOVIEW HEMOPRO

## (undated) DEVICE — ADHS SKIN DERMABOND TOP ADVANCED

## (undated) DEVICE — LOU OPEN HEART DR POLLOCK: Brand: MEDLINE INDUSTRIES, INC.

## (undated) DEVICE — DRSNG WND GEL FIBR OPTICELL AG PLS W/SLV LF 4X5IN  STRL

## (undated) DEVICE — HEMOCONCENTRATOR PERFUS LPS06

## (undated) DEVICE — SYS PERFUS SEP PLATLT W TIPS CUST

## (undated) DEVICE — SOL ISO/ALC RUB 70PCT 4OZ

## (undated) DEVICE — GLIDESHEATH BASIC HYDROPHILIC COATED INTRODUCER SHEATH: Brand: GLIDESHEATH

## (undated) DEVICE — SOL IRR H2O BTL 1000ML STRL

## (undated) DEVICE — BLOWER/MISTER AXIOUS OPCAB W/TBG

## (undated) DEVICE — GLV SURG BIOGEL M LTX PF 7 1/2

## (undated) DEVICE — GW INQWIRE FC PTFE J/3MM .021 180

## (undated) DEVICE — CATH DIAG IMPULSE FL3.5 6F 100CM

## (undated) DEVICE — SUT VIC 0 CT1 CR8 27IN JJ41G

## (undated) DEVICE — PK PERFUS CUST W/CARDIOPLEGIA

## (undated) DEVICE — TUBING EXTENSION SET: Brand: ARGYLE

## (undated) DEVICE — OASIS DRAIN, DUAL, IN-LINE, ATS COMPATIBLE: Brand: OASIS

## (undated) DEVICE — ST. SORBAVIEW ULTIMATE IJ SYSTEM A,C: Brand: CENTURION

## (undated) DEVICE — SUT PROLN MO.5 7/0 DBLARM BV175 6 2X30 BX/12

## (undated) DEVICE — PK HEART OPN 40

## (undated) DEVICE — SOL IRR NACL 0.9PCT BT 1000ML

## (undated) DEVICE — GW EMR FIX EXCHG J STD .035 3MM 260CM

## (undated) DEVICE — PK ATS CUST W CARDIOTOMY RESEVOIR

## (undated) DEVICE — 3M™ MEDIPORE™ H SOFT CLOTH SURGICAL TAPE 2862, 2 INCH X 10 YARD (5CM X 9,1M), 12 ROLLS/CASE: Brand: 3M™ MEDIPORE™

## (undated) DEVICE — INSUFFLATION TUBING,LAPAROSCOPIC: Brand: DEROYAL

## (undated) DEVICE — 12 FOOT DISPOSABLE EXTENSION CABLE WITH SAFE CONNECT / SCREW-DOWN

## (undated) DEVICE — KT MANIFLD CARDIAC

## (undated) DEVICE — PK CATH CARD 40

## (undated) DEVICE — SUT SILK 0 CT1 CR8 18IN C021D

## (undated) DEVICE — DRN WND CH RND FUL/FLUT NO/TROC 3/8IN 28F

## (undated) DEVICE — SENSR CERBRL O2 PK/2

## (undated) DEVICE — CORONARY ARTERY BYPASS GRAFT MARKERS, STAINLESS STEEL, DISTAL, WITHOUT HOLDER: Brand: ANASTOMARK CORONARY ARTERY BYPASS GRAFT MARKERS, STAINLESS STEEL, DISTAL

## (undated) DEVICE — OPTIFOAM GENTLE SA, POSTOP, 4X12: Brand: MEDLINE

## (undated) DEVICE — CATH DIAG IMPULSE FR4 6F 100CM